# Patient Record
Sex: MALE | Race: WHITE | Employment: OTHER | URBAN - METROPOLITAN AREA
[De-identification: names, ages, dates, MRNs, and addresses within clinical notes are randomized per-mention and may not be internally consistent; named-entity substitution may affect disease eponyms.]

---

## 2017-01-03 ENCOUNTER — OFFICE VISIT (OUTPATIENT)
Dept: FAMILY MEDICINE CLINIC | Age: 72
End: 2017-01-03

## 2017-01-03 ENCOUNTER — TELEPHONE (OUTPATIENT)
Dept: FAMILY MEDICINE CLINIC | Age: 72
End: 2017-01-03

## 2017-01-03 VITALS
BODY MASS INDEX: 37.42 KG/M2 | TEMPERATURE: 98.8 F | HEIGHT: 67 IN | WEIGHT: 238.4 LBS | DIASTOLIC BLOOD PRESSURE: 80 MMHG | SYSTOLIC BLOOD PRESSURE: 126 MMHG

## 2017-01-03 DIAGNOSIS — J06.9 VIRAL URI WITH COUGH: Primary | ICD-10-CM

## 2017-01-03 PROCEDURE — 99213 OFFICE O/P EST LOW 20 MIN: CPT | Performed by: FAMILY MEDICINE

## 2017-01-03 RX ORDER — FLUTICASONE PROPIONATE 50 MCG
2 SPRAY, SUSPENSION (ML) NASAL DAILY
Qty: 1 BOTTLE | Refills: 0 | Status: SHIPPED | OUTPATIENT
Start: 2017-01-03 | End: 2017-11-06

## 2017-01-03 ASSESSMENT — ENCOUNTER SYMPTOMS
COUGH: 1
VOMITING: 0
NAUSEA: 0

## 2017-01-09 ENCOUNTER — TELEPHONE (OUTPATIENT)
Dept: FAMILY MEDICINE CLINIC | Age: 72
End: 2017-01-09

## 2017-01-09 RX ORDER — CEFUROXIME AXETIL 250 MG/1
250 TABLET ORAL 2 TIMES DAILY
Qty: 20 TABLET | Refills: 0 | Status: SHIPPED | OUTPATIENT
Start: 2017-01-09 | End: 2017-01-19

## 2017-01-13 ENCOUNTER — TELEPHONE (OUTPATIENT)
Dept: CARDIOLOGY CLINIC | Age: 72
End: 2017-01-13

## 2017-01-23 ENCOUNTER — TELEPHONE (OUTPATIENT)
Dept: CARDIOLOGY CLINIC | Age: 72
End: 2017-01-23

## 2017-02-02 RX ORDER — TORSEMIDE 20 MG/1
TABLET ORAL
Qty: 360 TABLET | Refills: 0 | Status: SHIPPED | OUTPATIENT
Start: 2017-02-02 | End: 2017-02-20 | Stop reason: SDUPTHER

## 2017-02-21 RX ORDER — TORSEMIDE 20 MG/1
TABLET ORAL
Qty: 360 TABLET | Refills: 1 | Status: SHIPPED | OUTPATIENT
Start: 2017-02-21 | End: 2017-11-02 | Stop reason: SDUPTHER

## 2017-02-22 ENCOUNTER — NURSE ONLY (OUTPATIENT)
Dept: CARDIOLOGY CLINIC | Age: 72
End: 2017-02-22

## 2017-02-22 DIAGNOSIS — Z95.810 AUTOMATIC IMPLANTABLE CARDIOVERTER-DEFIBRILLATOR IN SITU: ICD-10-CM

## 2017-02-22 DIAGNOSIS — I50.22 CHRONIC SYSTOLIC HEART FAILURE (HCC): ICD-10-CM

## 2017-02-22 DIAGNOSIS — I42.9 CARDIOMYOPATHY (HCC): ICD-10-CM

## 2017-02-22 PROCEDURE — 93295 DEV INTERROG REMOTE 1/2/MLT: CPT | Performed by: INTERNAL MEDICINE

## 2017-02-22 PROCEDURE — 93296 REM INTERROG EVL PM/IDS: CPT | Performed by: INTERNAL MEDICINE

## 2017-02-22 PROCEDURE — 93297 REM INTERROG DEV EVAL ICPMS: CPT | Performed by: INTERNAL MEDICINE

## 2017-02-23 ENCOUNTER — OFFICE VISIT (OUTPATIENT)
Dept: FAMILY MEDICINE CLINIC | Age: 72
End: 2017-02-23

## 2017-02-23 VITALS
BODY MASS INDEX: 38.3 KG/M2 | WEIGHT: 244 LBS | DIASTOLIC BLOOD PRESSURE: 80 MMHG | TEMPERATURE: 98.6 F | HEIGHT: 67 IN | SYSTOLIC BLOOD PRESSURE: 126 MMHG

## 2017-02-23 DIAGNOSIS — F51.01 PRIMARY INSOMNIA: Primary | ICD-10-CM

## 2017-02-23 PROCEDURE — 99213 OFFICE O/P EST LOW 20 MIN: CPT | Performed by: FAMILY MEDICINE

## 2017-02-23 RX ORDER — ALPRAZOLAM 0.5 MG/1
TABLET ORAL
Qty: 30 TABLET | Refills: 0 | Status: SHIPPED | OUTPATIENT
Start: 2017-02-23 | End: 2017-02-23 | Stop reason: SDUPTHER

## 2017-02-23 RX ORDER — ALPRAZOLAM 0.5 MG/1
TABLET ORAL
Qty: 30 TABLET | Refills: 0 | Status: SHIPPED | OUTPATIENT
Start: 2017-02-23 | End: 2017-05-30 | Stop reason: SDUPTHER

## 2017-02-28 ENCOUNTER — TELEPHONE (OUTPATIENT)
Dept: INTERNAL MEDICINE CLINIC | Age: 72
End: 2017-02-28

## 2017-02-28 PROBLEM — F51.01 PRIMARY INSOMNIA: Status: ACTIVE | Noted: 2017-02-28

## 2017-02-28 ASSESSMENT — ENCOUNTER SYMPTOMS: SHORTNESS OF BREATH: 0

## 2017-03-02 DIAGNOSIS — Z51.81 MEDICATION MONITORING ENCOUNTER: Primary | ICD-10-CM

## 2017-03-02 DIAGNOSIS — Z12.11 SCREENING FOR COLON CANCER: ICD-10-CM

## 2017-03-07 LAB
6-ACETYLMORPHINE: NOT DETECTED
7-AMINOCLONAZEPAM: NOT DETECTED
ALPHA-OH-ALPRAZOLAM: PRESENT
ALPRAZOLAM: NOT DETECTED
AMPHETAMINE: NOT DETECTED
BARBITURATES: NOT DETECTED
BENZOYLECGONINE: NOT DETECTED
BUPRENORPHINE: NOT DETECTED
CARISOPRODOL: NOT DETECTED
CLONAZEPAM: NOT DETECTED
CODEINE: NOT DETECTED
CREATININE URINE: 52.4 MG/DL (ref 20–400)
DIAZEPAM: NOT DETECTED
DRUGS EXPECTED: NORMAL
EER PAIN MGT DRUG PANEL, HIGH RES/EMIT U: NORMAL
ETHYL GLUCURONIDE: NOT DETECTED
FENTANYL: NOT DETECTED
HYDROCODONE: NOT DETECTED
HYDROMORPHONE: NOT DETECTED
LORAZEPAM: NOT DETECTED
MARIJUANA METABOLITE: NOT DETECTED
MDA: NOT DETECTED
MDEA: NOT DETECTED
MDMA URINE: NOT DETECTED
MEPERIDINE: NOT DETECTED
METHADONE: NOT DETECTED
METHAMPHETAMINE: NOT DETECTED
METHYLPHENIDATE: NOT DETECTED
MIDAZOLAM: NOT DETECTED
MORPHINE: NOT DETECTED
NORBUPRENORPHINE, FREE: NOT DETECTED
NORDIAZEPAM: NOT DETECTED
NORFENTANYL: NOT DETECTED
NORHYDROCODONE, URINE: NOT DETECTED
NOROXYCODONE: NOT DETECTED
NOROXYMORPHONE, URINE: NOT DETECTED
OXAZEPAM: NOT DETECTED
OXYCODONE: NOT DETECTED
OXYMORPHONE: NOT DETECTED
PAIN MANAGEMENT DRUG PANEL: NORMAL
PAIN MANAGEMENT DRUG PANEL: NORMAL
PCP: NOT DETECTED
PHENTERMINE: NOT DETECTED
PROPOXYPHENE: NOT DETECTED
TAPENTADOL, URINE: NOT DETECTED
TAPENTADOL-O-SULFATE, URINE: NOT DETECTED
TEMAZEPAM: NOT DETECTED
TRAMADOL: NOT DETECTED
ZOLPIDEM: NOT DETECTED

## 2017-03-16 ENCOUNTER — TELEPHONE (OUTPATIENT)
Dept: FAMILY MEDICINE CLINIC | Age: 72
End: 2017-03-16

## 2017-03-16 RX ORDER — SYRINGE-NEEDLE,INSULIN,0.5 ML 28GX1/2"
SYRINGE, EMPTY DISPOSABLE MISCELLANEOUS
Qty: 200 EACH | Refills: 12 | Status: SHIPPED | OUTPATIENT
Start: 2017-03-16 | End: 2020-01-01 | Stop reason: ALTCHOICE

## 2017-03-30 ENCOUNTER — OFFICE VISIT (OUTPATIENT)
Dept: CARDIOLOGY CLINIC | Age: 72
End: 2017-03-30

## 2017-03-30 ENCOUNTER — PROCEDURE VISIT (OUTPATIENT)
Dept: CARDIOLOGY CLINIC | Age: 72
End: 2017-03-30

## 2017-03-30 VITALS
OXYGEN SATURATION: 97 % | DIASTOLIC BLOOD PRESSURE: 70 MMHG | BODY MASS INDEX: 37.13 KG/M2 | WEIGHT: 245 LBS | SYSTOLIC BLOOD PRESSURE: 130 MMHG | HEIGHT: 68 IN | HEART RATE: 97 BPM

## 2017-03-30 DIAGNOSIS — I25.5 ISCHEMIC CARDIOMYOPATHY: Primary | ICD-10-CM

## 2017-03-30 DIAGNOSIS — I42.9 CARDIOMYOPATHY (HCC): ICD-10-CM

## 2017-03-30 DIAGNOSIS — Z95.810 AUTOMATIC IMPLANTABLE CARDIOVERTER-DEFIBRILLATOR IN SITU: ICD-10-CM

## 2017-03-30 DIAGNOSIS — I25.10 CORONARY ARTERY DISEASE INVOLVING NATIVE HEART WITHOUT ANGINA PECTORIS, UNSPECIFIED VESSEL OR LESION TYPE: ICD-10-CM

## 2017-03-30 DIAGNOSIS — I48.0 PAROXYSMAL ATRIAL FIBRILLATION (HCC): ICD-10-CM

## 2017-03-30 DIAGNOSIS — E78.2 MIXED HYPERLIPIDEMIA: ICD-10-CM

## 2017-03-30 DIAGNOSIS — I10 ESSENTIAL HYPERTENSION: ICD-10-CM

## 2017-03-30 DIAGNOSIS — I50.22 CHRONIC SYSTOLIC HEART FAILURE (HCC): ICD-10-CM

## 2017-03-30 DIAGNOSIS — I50.22 CHRONIC SYSTOLIC CONGESTIVE HEART FAILURE (HCC): ICD-10-CM

## 2017-03-30 PROCEDURE — 93284 PRGRMG EVAL IMPLANTABLE DFB: CPT | Performed by: INTERNAL MEDICINE

## 2017-03-30 PROCEDURE — 93290 INTERROG DEV EVAL ICPMS IP: CPT | Performed by: INTERNAL MEDICINE

## 2017-03-30 PROCEDURE — 99214 OFFICE O/P EST MOD 30 MIN: CPT | Performed by: INTERNAL MEDICINE

## 2017-04-09 RX ORDER — METOPROLOL SUCCINATE 100 MG/1
TABLET, EXTENDED RELEASE ORAL
Qty: 90 TABLET | Refills: 0 | Status: SHIPPED | OUTPATIENT
Start: 2017-04-09 | End: 2017-06-05 | Stop reason: SDUPTHER

## 2017-04-12 RX ORDER — RAMIPRIL 10 MG/1
CAPSULE ORAL
Qty: 90 CAPSULE | Refills: 3 | Status: SHIPPED | OUTPATIENT
Start: 2017-04-12 | End: 2018-01-13 | Stop reason: SDUPTHER

## 2017-05-30 ENCOUNTER — OFFICE VISIT (OUTPATIENT)
Dept: FAMILY MEDICINE CLINIC | Age: 72
End: 2017-05-30

## 2017-05-30 VITALS
HEIGHT: 68 IN | WEIGHT: 238.4 LBS | TEMPERATURE: 98 F | SYSTOLIC BLOOD PRESSURE: 122 MMHG | DIASTOLIC BLOOD PRESSURE: 78 MMHG | BODY MASS INDEX: 36.13 KG/M2

## 2017-05-30 DIAGNOSIS — Z11.59 NEED FOR HEPATITIS C SCREENING TEST: ICD-10-CM

## 2017-05-30 DIAGNOSIS — Z79.4 TYPE 2 DIABETES MELLITUS WITH HYPEROSMOLARITY WITHOUT COMA, WITH LONG-TERM CURRENT USE OF INSULIN (HCC): ICD-10-CM

## 2017-05-30 DIAGNOSIS — E11.00 TYPE 2 DIABETES MELLITUS WITH HYPEROSMOLARITY WITHOUT COMA, WITH LONG-TERM CURRENT USE OF INSULIN (HCC): ICD-10-CM

## 2017-05-30 DIAGNOSIS — F51.01 PRIMARY INSOMNIA: Primary | ICD-10-CM

## 2017-05-30 DIAGNOSIS — Z12.5 SCREENING FOR PROSTATE CANCER: ICD-10-CM

## 2017-05-30 DIAGNOSIS — E78.2 MIXED HYPERLIPIDEMIA: ICD-10-CM

## 2017-05-30 DIAGNOSIS — I10 ESSENTIAL HYPERTENSION: ICD-10-CM

## 2017-05-30 PROCEDURE — 99213 OFFICE O/P EST LOW 20 MIN: CPT | Performed by: FAMILY MEDICINE

## 2017-05-30 RX ORDER — ALPRAZOLAM 0.5 MG/1
TABLET ORAL
Qty: 30 TABLET | Refills: 0 | Status: SHIPPED | OUTPATIENT
Start: 2017-05-30 | End: 2017-05-30 | Stop reason: SDUPTHER

## 2017-05-30 RX ORDER — ALPRAZOLAM 0.5 MG/1
TABLET ORAL
Qty: 30 TABLET | Refills: 0 | Status: SHIPPED | OUTPATIENT
Start: 2017-05-30 | End: 2017-08-29 | Stop reason: SDUPTHER

## 2017-06-05 ENCOUNTER — TELEPHONE (OUTPATIENT)
Dept: OTHER | Facility: CLINIC | Age: 72
End: 2017-06-05

## 2017-06-06 RX ORDER — METOPROLOL SUCCINATE 100 MG/1
TABLET, EXTENDED RELEASE ORAL
Qty: 90 TABLET | Refills: 0 | Status: SHIPPED | OUTPATIENT
Start: 2017-06-06 | End: 2017-10-18 | Stop reason: SDUPTHER

## 2017-06-06 RX ORDER — ATORVASTATIN CALCIUM 80 MG/1
TABLET, FILM COATED ORAL
Qty: 90 TABLET | Refills: 0 | Status: SHIPPED | OUTPATIENT
Start: 2017-06-06 | End: 2017-09-18 | Stop reason: SDUPTHER

## 2017-06-06 RX ORDER — GABAPENTIN 100 MG/1
CAPSULE ORAL
Qty: 180 CAPSULE | Refills: 1 | Status: SHIPPED | OUTPATIENT
Start: 2017-06-06 | End: 2017-11-20 | Stop reason: SDUPTHER

## 2017-07-07 ENCOUNTER — TELEPHONE (OUTPATIENT)
Dept: CARDIOLOGY CLINIC | Age: 72
End: 2017-07-07

## 2017-07-10 ENCOUNTER — TELEPHONE (OUTPATIENT)
Dept: FAMILY MEDICINE CLINIC | Age: 72
End: 2017-07-10

## 2017-07-11 ENCOUNTER — NURSE ONLY (OUTPATIENT)
Dept: CARDIOLOGY CLINIC | Age: 72
End: 2017-07-11

## 2017-07-11 DIAGNOSIS — I42.9 CARDIOMYOPATHY (HCC): ICD-10-CM

## 2017-07-11 DIAGNOSIS — I50.22 CHRONIC SYSTOLIC HEART FAILURE (HCC): ICD-10-CM

## 2017-07-11 DIAGNOSIS — Z95.810 AUTOMATIC IMPLANTABLE CARDIOVERTER-DEFIBRILLATOR IN SITU: ICD-10-CM

## 2017-07-11 PROCEDURE — 93295 DEV INTERROG REMOTE 1/2/MLT: CPT | Performed by: INTERNAL MEDICINE

## 2017-07-11 PROCEDURE — 93297 REM INTERROG DEV EVAL ICPMS: CPT | Performed by: INTERNAL MEDICINE

## 2017-07-11 PROCEDURE — 93296 REM INTERROG EVL PM/IDS: CPT | Performed by: INTERNAL MEDICINE

## 2017-07-21 ENCOUNTER — TELEPHONE (OUTPATIENT)
Dept: CARDIOLOGY CLINIC | Age: 72
End: 2017-07-21

## 2017-07-21 DIAGNOSIS — R60.0 BILATERAL LOWER EXTREMITY EDEMA: Primary | ICD-10-CM

## 2017-07-24 DIAGNOSIS — R60.0 BILATERAL LOWER EXTREMITY EDEMA: ICD-10-CM

## 2017-07-24 LAB
ANION GAP SERPL CALCULATED.3IONS-SCNC: 17 MMOL/L (ref 3–16)
BUN BLDV-MCNC: 39 MG/DL (ref 7–20)
CALCIUM SERPL-MCNC: 9.5 MG/DL (ref 8.3–10.6)
CHLORIDE BLD-SCNC: 91 MMOL/L (ref 99–110)
CO2: 30 MMOL/L (ref 21–32)
CREAT SERPL-MCNC: 1.6 MG/DL (ref 0.8–1.3)
GFR AFRICAN AMERICAN: 52
GFR NON-AFRICAN AMERICAN: 43
GLUCOSE BLD-MCNC: 203 MG/DL (ref 70–99)
POTASSIUM SERPL-SCNC: 4 MMOL/L (ref 3.5–5.1)
SODIUM BLD-SCNC: 138 MMOL/L (ref 136–145)

## 2017-07-25 ENCOUNTER — TELEPHONE (OUTPATIENT)
Dept: CARDIOLOGY CLINIC | Age: 72
End: 2017-07-25

## 2017-07-31 ENCOUNTER — OFFICE VISIT (OUTPATIENT)
Dept: FAMILY MEDICINE CLINIC | Age: 72
End: 2017-07-31

## 2017-07-31 DIAGNOSIS — Z79.4 UNCONTROLLED TYPE 2 DIABETES MELLITUS WITH HYPEROSMOLARITY WITHOUT COMA, WITH LONG-TERM CURRENT USE OF INSULIN (HCC): Primary | ICD-10-CM

## 2017-07-31 DIAGNOSIS — E11.00 UNCONTROLLED TYPE 2 DIABETES MELLITUS WITH HYPEROSMOLARITY WITHOUT COMA, WITH LONG-TERM CURRENT USE OF INSULIN (HCC): Primary | ICD-10-CM

## 2017-07-31 PROCEDURE — 99213 OFFICE O/P EST LOW 20 MIN: CPT | Performed by: FAMILY MEDICINE

## 2017-08-14 ENCOUNTER — TELEPHONE (OUTPATIENT)
Dept: FAMILY MEDICINE CLINIC | Age: 72
End: 2017-08-14

## 2017-08-14 ASSESSMENT — ENCOUNTER SYMPTOMS: SHORTNESS OF BREATH: 0

## 2017-08-14 NOTE — TELEPHONE ENCOUNTER
Pt is calling w/ bs readings : All done in the morning     148    121  105  155  131    Pl advise.    [phone]

## 2017-08-14 NOTE — TELEPHONE ENCOUNTER
Please call mr irvin -- have him increase to 85 units units of the lantus.   Call here again Friday morning please with am sugars for tues, wed, thurs, and Friday    thanks

## 2017-08-28 ENCOUNTER — OFFICE VISIT (OUTPATIENT)
Dept: FAMILY MEDICINE CLINIC | Age: 72
End: 2017-08-28

## 2017-08-28 VITALS
BODY MASS INDEX: 36.22 KG/M2 | HEART RATE: 100 BPM | WEIGHT: 239 LBS | TEMPERATURE: 97.6 F | DIASTOLIC BLOOD PRESSURE: 80 MMHG | SYSTOLIC BLOOD PRESSURE: 120 MMHG | HEIGHT: 68 IN

## 2017-08-28 DIAGNOSIS — F51.01 PRIMARY INSOMNIA: Primary | ICD-10-CM

## 2017-08-28 DIAGNOSIS — Z12.5 SCREENING FOR PROSTATE CANCER: ICD-10-CM

## 2017-08-28 DIAGNOSIS — Z79.4 TYPE 2 DIABETES MELLITUS WITH HYPEROSMOLARITY WITHOUT COMA, WITH LONG-TERM CURRENT USE OF INSULIN (HCC): ICD-10-CM

## 2017-08-28 DIAGNOSIS — E11.00 TYPE 2 DIABETES MELLITUS WITH HYPEROSMOLARITY WITHOUT COMA, WITH LONG-TERM CURRENT USE OF INSULIN (HCC): ICD-10-CM

## 2017-08-28 DIAGNOSIS — Z11.59 NEED FOR HEPATITIS C SCREENING TEST: ICD-10-CM

## 2017-08-28 LAB
ALT SERPL-CCNC: 13 U/L (ref 10–40)
ANION GAP SERPL CALCULATED.3IONS-SCNC: 14 MMOL/L (ref 3–16)
AST SERPL-CCNC: 15 U/L (ref 15–37)
BUN BLDV-MCNC: 39 MG/DL (ref 7–20)
CALCIUM SERPL-MCNC: 9.7 MG/DL (ref 8.3–10.6)
CHLORIDE BLD-SCNC: 94 MMOL/L (ref 99–110)
CHOLESTEROL, TOTAL: 134 MG/DL (ref 0–199)
CO2: 31 MMOL/L (ref 21–32)
CREAT SERPL-MCNC: 1.5 MG/DL (ref 0.8–1.3)
CREATININE URINE: 81.7 MG/DL (ref 39–259)
GFR AFRICAN AMERICAN: 56
GFR NON-AFRICAN AMERICAN: 46
GLUCOSE BLD-MCNC: 166 MG/DL (ref 70–99)
HDLC SERPL-MCNC: 33 MG/DL (ref 40–60)
HEPATITIS C ANTIBODY INTERPRETATION: NORMAL
LDL CHOLESTEROL CALCULATED: 72 MG/DL
MICROALBUMIN UR-MCNC: 5.4 MG/DL
MICROALBUMIN/CREAT UR-RTO: 66.1 MG/G (ref 0–30)
POTASSIUM SERPL-SCNC: 4 MMOL/L (ref 3.5–5.1)
PROSTATE SPECIFIC ANTIGEN: 0.33 NG/ML (ref 0–4)
SODIUM BLD-SCNC: 139 MMOL/L (ref 136–145)
TRIGL SERPL-MCNC: 147 MG/DL (ref 0–150)
VLDLC SERPL CALC-MCNC: 29 MG/DL

## 2017-08-28 PROCEDURE — 99213 OFFICE O/P EST LOW 20 MIN: CPT | Performed by: FAMILY MEDICINE

## 2017-08-29 ENCOUNTER — NURSE ONLY (OUTPATIENT)
Dept: FAMILY MEDICINE CLINIC | Age: 72
End: 2017-08-29

## 2017-08-29 DIAGNOSIS — Z23 NEED FOR INFLUENZA VACCINATION: Primary | ICD-10-CM

## 2017-08-29 LAB
ESTIMATED AVERAGE GLUCOSE: 214.5 MG/DL
HBA1C MFR BLD: 9.1 %

## 2017-08-29 PROCEDURE — 90662 IIV NO PRSV INCREASED AG IM: CPT | Performed by: FAMILY MEDICINE

## 2017-08-29 PROCEDURE — G0008 ADMIN INFLUENZA VIRUS VAC: HCPCS | Performed by: FAMILY MEDICINE

## 2017-08-29 RX ORDER — ALPRAZOLAM 0.5 MG/1
TABLET ORAL
Qty: 30 TABLET | Refills: 0 | Status: SHIPPED | OUTPATIENT
Start: 2017-08-29 | End: 2017-08-29 | Stop reason: SDUPTHER

## 2017-08-29 RX ORDER — ALPRAZOLAM 0.5 MG/1
TABLET ORAL
Qty: 30 TABLET | Refills: 0 | Status: SHIPPED | OUTPATIENT
Start: 2017-08-29 | End: 2017-12-08 | Stop reason: SDUPTHER

## 2017-09-05 ASSESSMENT — ENCOUNTER SYMPTOMS: SHORTNESS OF BREATH: 0

## 2017-09-08 ENCOUNTER — TELEPHONE (OUTPATIENT)
Dept: FAMILY MEDICINE CLINIC | Age: 72
End: 2017-09-08

## 2017-09-15 RX ORDER — GLIMEPIRIDE 2 MG/1
2 TABLET ORAL EVERY MORNING
Qty: 30 TABLET | Refills: 3 | Status: SHIPPED | OUTPATIENT
Start: 2017-09-15 | End: 2018-01-09 | Stop reason: ALTCHOICE

## 2017-09-20 RX ORDER — ATORVASTATIN CALCIUM 80 MG/1
TABLET, FILM COATED ORAL
Qty: 90 TABLET | Refills: 0 | Status: SHIPPED | OUTPATIENT
Start: 2017-09-20 | End: 2017-11-20 | Stop reason: SDUPTHER

## 2017-10-09 ENCOUNTER — OFFICE VISIT (OUTPATIENT)
Dept: FAMILY MEDICINE CLINIC | Age: 72
End: 2017-10-09

## 2017-10-09 VITALS
SYSTOLIC BLOOD PRESSURE: 120 MMHG | HEIGHT: 68 IN | WEIGHT: 247 LBS | BODY MASS INDEX: 37.44 KG/M2 | TEMPERATURE: 98 F | DIASTOLIC BLOOD PRESSURE: 70 MMHG

## 2017-10-09 DIAGNOSIS — I25.10 CORONARY ARTERY DISEASE INVOLVING NATIVE HEART WITHOUT ANGINA PECTORIS, UNSPECIFIED VESSEL OR LESION TYPE: ICD-10-CM

## 2017-10-09 DIAGNOSIS — I10 ESSENTIAL HYPERTENSION: ICD-10-CM

## 2017-10-09 DIAGNOSIS — J44.9 CHRONIC OBSTRUCTIVE PULMONARY DISEASE, UNSPECIFIED COPD TYPE (HCC): ICD-10-CM

## 2017-10-09 DIAGNOSIS — Z01.818 PRE-OP EXAM: Primary | ICD-10-CM

## 2017-10-09 PROCEDURE — 99214 OFFICE O/P EST MOD 30 MIN: CPT | Performed by: FAMILY MEDICINE

## 2017-10-09 ASSESSMENT — ENCOUNTER SYMPTOMS
ROS SKIN COMMENTS: NO SKIN WOUNDS.
EYE DISCHARGE: 0
COUGH: 0
EYE ITCHING: 0
TROUBLE SWALLOWING: 0
BLOOD IN STOOL: 0
ABDOMINAL PAIN: 0
EYE REDNESS: 0

## 2017-10-09 NOTE — PROGRESS NOTES
10/9/2017    Meryle Chuck is a 81 y.o. male    Chief Complaint   Patient presents with    Pre-op Exam     Pre op left cataract 10/10/18 Dr. Amisha Vera, [address] [place], Fax# [phone]. Has 3 different eyedrops for surgery, understands how to take them, but doesn't have names. SUBJECTIVE  HPI--pt is here for a pre op exam today -- he is having a left cataract removal           Review of Systems   Constitutional: Negative for chills, fever and unexpected weight change. HENT: Negative for ear discharge, nosebleeds and trouble swallowing. Eyes: Negative for discharge, redness and itching. Respiratory: Negative for cough. Cardiovascular: Negative for chest pain. Gastrointestinal: Negative for abdominal pain and blood in stool. Genitourinary: Negative for dysuria and hematuria. Skin: Negative for rash. No skin wounds. Neurological: Negative for dizziness and syncope. Hematological: Negative for adenopathy. Psychiatric/Behavioral: Negative for suicidal ideas.        Past Medical History:   Diagnosis Date    Acute on chronic systolic congestive heart failure ([address] 2/6/2014    Anemia     Arthritis     Bronchiolitis obliterans organizing pneumonia ([address]     CAD (coronary artery disease)     cardiomyopathy    CHF (congestive heart failure) (HCC)     Congestive heart failure, unspecified     Congestive heart failure    Disease of blood and blood forming organ     Hyperlipidemia     Hypertension     Kidney stone     Neuropathy ([address]     Osteoarthritis 8/13/2012    Pneumonia     BOOP    Pure hypercholesterolemia 8/1/2011    Seasonal allergies     Sleep apnea     Type II or unspecified type diabetes mellitus without mention of complication, not stated as uncontrolled     Type II or unspecified type diabetes mellitus without mention of complication, uncontrolled 8/1/2011     Past Surgical History:   Procedure Laterality Date    CARDIAC DEFIBRILLATOR PLACEMENT      CORONARY ANGIOPLASTY WITH STENT PLACEMENT  2000    CORONARY ANGIOPLASTY WITH STENT PLACEMENT  11.2012    HEMORRHOID SURGERY       Family History   Problem Relation Age of Onset    Cancer Mother     Diabetes Mother     Diabetes Sister     Cancer Brother      History   Smoking Status    Former Smoker    Quit date: 1/1/1967   Smokeless Tobacco    Never Used      Patient Active Problem List   Diagnosis    HTN (hypertension)    CAD (coronary artery disease)    Ischemic cardiomyopathy    COPD (chronic obstructive pulmonary disease) [facility]    [facility]    MIKEY [facility]acute kidney injury) ([facility]    Hyperlipidemia    DMII (diabetes mellitus, type 2) ([facility]    Elevated troponin    Hypomagnesemia    Chest pain    Atrial fibrillation ([facility]    NSTEMI (non-ST elevated myocardial infarction) ([facility]    Cardiomyopathy ([facility]    AMI (acute myocardial infarction) ([facility]    DM hyperosmolarity type II, uncontrolled ([facility]    Anemia    Insomnia    Automatic implantable cardioverter-defibrillator in situ    Chronic systolic heart failure ([facility]    Primary insomnia     Health Maintenance reviewed -- see chart. OBJECTIVE  Physical Exam   Constitutional: He is oriented to person, place, and time. He appears well-developed and well-nourished. Eyes: Conjunctivae and EOM are normal. Pupils are equal, round, and reactive to light. Neck: Normal range of motion. Neck supple. No thyromegaly present. Cardiovascular: Normal rate and regular rhythm. Pulmonary/Chest: Effort normal and breath sounds normal.   Abdominal: Soft. Bowel sounds are normal. He exhibits no mass. There is no tenderness. Musculoskeletal: Normal range of motion. He exhibits no edema. Neurological: He is alert and oriented to person, place, and time. Skin: No rash noted. No sores or skin wounds noted. Psychiatric: He has a normal mood and affect.  His behavior is normal. Judgment and thought content normal.   Vitals reviewed. allergies  Cipro xr; Claritin [loratadine]; Levofloxacin; and Peppermint flavor [flavoring agent]         Current Outpatient Prescriptions   Medication Sig Dispense Refill    atorvastatin (LIPITOR) 80 MG tablet TAKE 1 TABLET EVERY DAY 90 tablet 0    glimepiride (AMARYL) 2 MG tablet Take 1 tablet by mouth every morning 30 tablet 3    ALPRAZolam (XANAX) 0.5 MG tablet 1 po at hs prn anxiety. No etoh. No driving. Do NOT take with ambien. 30 tablet 0    apixaban (ELIQUIS) 5 MG TABS tablet Take 1 tablet by mouth 2 times daily 56 tablet 0    metoprolol succinate (TOPROL XL) 100 MG extended release tablet TAKE 1 TABLET EVERY DAY 90 tablet 0    gabapentin (NEURONTIN) 100 MG capsule TAKE 2 CAPSULES EVERY  capsule 1    ramipril (ALTACE) 10 MG capsule TAKE 1 CAPSULE EVERY DAY 90 capsule 3    Insulin Syringe-Needle U-100 (INSULIN SYRINGE .5CC/30GX1/2\") 30G X 1/2\" 0.5 ML MISC Use 5 times a day with insulin. GENERIC IS OK. 200 each 12    Insulin Pen Needle (B-D ULTRAFINE III SHORT PEN) 31G X 8 MM MISC 1 each by Does not apply route daily 100 each 3    torsemide (DEMADEX) 20 MG tablet TAKE 2 TABLETS BY MOUTH TWICE DAILY 360 tablet 1    fluticasone (FLONASE) 50 MCG/ACT nasal spray 2 sprays by Nasal route daily 1 Bottle 0    magnesium oxide (MAG-OX) 400 MG tablet Take 1 tablet daily. 90 tablet 2    ranitidine (ZANTAC) 300 MG tablet Take 1 tablet by mouth nightly 30 tablet 3    guaiFENesin (MUCINEX) 600 MG extended release tablet Take 2 tablets by mouth 2 times daily 20 tablet 0    albuterol sulfate HFA (PROAIR HFA) 108 (90 BASE) MCG/ACT inhaler Inhale 2 puffs into the lungs every 4 hours as needed for Wheezing or Shortness of Breath 1 Inhaler 0    glucose blood VI test strips (ACCU-CHEK CARY) strip 1 each by In Vitro route daily As needed.   DX: E11.9 100 each 3    Lancets MISC accu-check cary  DX: E11.9 100 each 3    Blood Glucose Monitoring Suppl (ACCU-CHEK CARY PLUS) angina pectoris, unspecified vessel or lesion type     4. Chronic obstructive pulmonary disease, unspecified COPD type ([facility]         Екатерина Ramsay is at a low cardiovascular risk for planned left cataract removal.  He may proceed with planned surgery. New meds this visit:  No orders of the defined types were placed in this encounter. continue with the following meds:    Outpatient Encounter Prescriptions as of 10/9/2017   Medication Sig Dispense Refill    atorvastatin (LIPITOR) 80 MG tablet TAKE 1 TABLET EVERY DAY 90 tablet 0    glimepiride (AMARYL) 2 MG tablet Take 1 tablet by mouth every morning 30 tablet 3    ALPRAZolam (XANAX) 0.5 MG tablet 1 po at hs prn anxiety. No etoh. No driving. Do NOT take with ambien. 30 tablet 0    apixaban (ELIQUIS) 5 MG TABS tablet Take 1 tablet by mouth 2 times daily 56 tablet 0    metoprolol succinate (TOPROL XL) 100 MG extended release tablet TAKE 1 TABLET EVERY DAY 90 tablet 0    gabapentin (NEURONTIN) 100 MG capsule TAKE 2 CAPSULES EVERY  capsule 1    ramipril (ALTACE) 10 MG capsule TAKE 1 CAPSULE EVERY DAY 90 capsule 3    Insulin Syringe-Needle U-100 (INSULIN SYRINGE .5CC/30GX1/2\") 30G X 1/2\" 0.5 ML MISC Use 5 times a day with insulin. GENERIC IS OK. 200 each 12    Insulin Pen Needle (B-D ULTRAFINE III SHORT PEN) 31G X 8 MM MISC 1 each by Does not apply route daily 100 each 3    torsemide (DEMADEX) 20 MG tablet TAKE 2 TABLETS BY MOUTH TWICE DAILY 360 tablet 1    fluticasone (FLONASE) 50 MCG/ACT nasal spray 2 sprays by Nasal route daily 1 Bottle 0    magnesium oxide (MAG-OX) 400 MG tablet Take 1 tablet daily.  90 tablet 2    ranitidine (ZANTAC) 300 MG tablet Take 1 tablet by mouth nightly 30 tablet 3    guaiFENesin (MUCINEX) 600 MG extended release tablet Take 2 tablets by mouth 2 times daily 20 tablet 0    albuterol sulfate HFA (PROAIR HFA) 108 (90 BASE) MCG/ACT inhaler Inhale 2 puffs into the lungs every 4 hours as needed for

## 2017-10-18 ENCOUNTER — TELEPHONE (OUTPATIENT)
Dept: OTHER | Facility: CLINIC | Age: 72
End: 2017-10-18

## 2017-10-20 ENCOUNTER — TELEPHONE (OUTPATIENT)
Dept: FAMILY MEDICINE CLINIC | Age: 72
End: 2017-10-20

## 2017-10-20 RX ORDER — METOPROLOL SUCCINATE 100 MG/1
TABLET, EXTENDED RELEASE ORAL
Qty: 30 TABLET | Refills: 0 | Status: SHIPPED | OUTPATIENT
Start: 2017-10-20 | End: 2018-03-19 | Stop reason: SDUPTHER

## 2017-10-20 NOTE — TELEPHONE ENCOUNTER
Pt states he is short on his Rx for Metoprolol and will run out before he gets refill through mail order. Pt is requesting a 30 day supply be sent to [facility]. Pls advise.

## 2017-10-23 ENCOUNTER — PROCEDURE VISIT (OUTPATIENT)
Dept: CARDIOLOGY CLINIC | Age: 72
End: 2017-10-23

## 2017-10-23 ENCOUNTER — OFFICE VISIT (OUTPATIENT)
Dept: CARDIOLOGY CLINIC | Age: 72
End: 2017-10-23

## 2017-10-23 VITALS
HEART RATE: 88 BPM | HEIGHT: 68 IN | OXYGEN SATURATION: 95 % | SYSTOLIC BLOOD PRESSURE: 128 MMHG | BODY MASS INDEX: 36.37 KG/M2 | DIASTOLIC BLOOD PRESSURE: 72 MMHG | WEIGHT: 240 LBS

## 2017-10-23 DIAGNOSIS — I25.5 ISCHEMIC CARDIOMYOPATHY: Primary | ICD-10-CM

## 2017-10-23 DIAGNOSIS — I10 ESSENTIAL HYPERTENSION: ICD-10-CM

## 2017-10-23 DIAGNOSIS — I48.20 CHRONIC ATRIAL FIBRILLATION (HCC): ICD-10-CM

## 2017-10-23 DIAGNOSIS — I50.22 CHRONIC SYSTOLIC HEART FAILURE (HCC): ICD-10-CM

## 2017-10-23 DIAGNOSIS — R60.0 LOWER EXTREMITY EDEMA: ICD-10-CM

## 2017-10-23 DIAGNOSIS — Z95.810 AUTOMATIC IMPLANTABLE CARDIOVERTER-DEFIBRILLATOR IN SITU: Primary | ICD-10-CM

## 2017-10-23 DIAGNOSIS — I25.5 ISCHEMIC CARDIOMYOPATHY: ICD-10-CM

## 2017-10-23 PROCEDURE — 99215 OFFICE O/P EST HI 40 MIN: CPT | Performed by: INTERNAL MEDICINE

## 2017-10-23 PROCEDURE — G8427 DOCREV CUR MEDS BY ELIG CLIN: HCPCS | Performed by: INTERNAL MEDICINE

## 2017-10-23 PROCEDURE — G8484 FLU IMMUNIZE NO ADMIN: HCPCS | Performed by: INTERNAL MEDICINE

## 2017-10-23 PROCEDURE — 1036F TOBACCO NON-USER: CPT | Performed by: INTERNAL MEDICINE

## 2017-10-23 PROCEDURE — 1123F ACP DISCUSS/DSCN MKR DOCD: CPT | Performed by: INTERNAL MEDICINE

## 2017-10-23 PROCEDURE — 93290 INTERROG DEV EVAL ICPMS IP: CPT | Performed by: INTERNAL MEDICINE

## 2017-10-23 PROCEDURE — 3017F COLORECTAL CA SCREEN DOC REV: CPT | Performed by: INTERNAL MEDICINE

## 2017-10-23 PROCEDURE — 93284 PRGRMG EVAL IMPLANTABLE DFB: CPT | Performed by: INTERNAL MEDICINE

## 2017-10-23 PROCEDURE — G8417 CALC BMI ABV UP PARAM F/U: HCPCS | Performed by: INTERNAL MEDICINE

## 2017-10-23 PROCEDURE — 4040F PNEUMOC VAC/ADMIN/RCVD: CPT | Performed by: INTERNAL MEDICINE

## 2017-10-23 PROCEDURE — G8598 ASA/ANTIPLAT THER USED: HCPCS | Performed by: INTERNAL MEDICINE

## 2017-10-23 NOTE — PROGRESS NOTES
[facility]   Electrophysiology   Date: 10/23/2017    Chief Complaint: Medication questions    HPI: Charmaine Pickering is a 81 y.o. with a PMH significant for severe CAD in the RCA and LAD, HLD, HTN, ischemic cardiomyopathy with an LVEF of 30-35% and CHF. Initially referred by Dr. Kai Osman for ICD placement. He complained of daily fatigue and intermittent SOB. He stated that he is able to climb a flight of stairs without difficulty. He reported that he would be able to walk a city block if it wasn't for his chronic back pain. Underwent Bi V-ICD placement 9/2015. Afib was documented on a device interrogation in 2016. He had reported associated fatigue, SOB and palpitations. Initially, he was loaded with amiodarone and a DCCV was scheduled. He was unable to tolerate the amiodarone therapy, he cancelled the DCCV and now states that the Afib \"doesn't bother him. \"    Presents today for a follow up for device and afib management. He reports that he is doing well. He offers no current complaints. He denies lightheadedness, dizziness, chest pain, orthopnea, presyncope or syncope. He has been compliant with his medications and tolerating well. He does not wish to pursue anti-arrhythmic therapy for his Afib.     Past Medical History:   Diagnosis Date    Acute on chronic systolic congestive heart failure ([address] 2/6/2014    Anemia     Arthritis     Bronchiolitis obliterans organizing pneumonia ([address]     CAD (coronary artery disease)     cardiomyopathy    CHF (congestive heart failure) (HCC)     Congestive heart failure, unspecified     Congestive heart failure    Disease of blood and blood forming organ     Hyperlipidemia     Hypertension     Kidney stone     Neuropathy ([address]     Osteoarthritis 8/13/2012    Pneumonia     BOOP    Pure hypercholesterolemia 8/1/2011    Seasonal allergies     Sleep apnea     Type II or unspecified type diabetes mellitus without mention of complication, not stated as uncontrolled     Type II or unspecified type diabetes mellitus without mention of complication, uncontrolled 8/1/2011        Past Surgical History:   Procedure Laterality Date    CARDIAC DEFIBRILLATOR PLACEMENT      CORONARY ANGIOPLASTY WITH STENT PLACEMENT  2000    CORONARY ANGIOPLASTY WITH STENT PLACEMENT  11.2012    HEMORRHOID SURGERY         Allergies: Allergies   Allergen Reactions    Cipro Xr     Claritin [Loratadine]     Levofloxacin     Peppermint Flavor [Flavoring Agent] Swelling       Medication:   Prior to Admission medications    Medication Sig Start Date End Date Taking? Authorizing Provider   metoprolol succinate (TOPROL XL) 100 MG extended release tablet TAKE 1 TABLET EVERY DAY 10/20/17  Yes Negar Florentino, DO   atorvastatin (LIPITOR) 80 MG tablet TAKE 1 TABLET EVERY DAY 9/20/17  Yes Negar Florentino, DO   glimepiride (AMARYL) 2 MG tablet Take 1 tablet by mouth every morning 9/15/17  Yes Janelle Mathew DO   ALPRAZolam (XANAX) 0.5 MG tablet 1 po at hs prn anxiety. No etoh. No driving. Do NOT take with ambien. 8/29/17  Yes Negar Florentino, DO   apixaban (ELIQUIS) 5 MG TABS tablet Take 1 tablet by mouth 2 times daily 7/10/17  Yes Nikko Hester MD   gabapentin (NEURONTIN) 100 MG capsule TAKE 2 CAPSULES EVERY DAY 6/6/17  Yes Jerilyn Mathew DO   ramipril (ALTACE) 10 MG capsule TAKE 1 CAPSULE EVERY DAY 4/12/17  Yes Negar Florentino, DO   Insulin Syringe-Needle U-100 (INSULIN SYRINGE .5CC/30GX1/2\") 30G X 1/2\" 0.5 ML MISC Use 5 times a day with insulin.  GENERIC IS OK. 3/16/17  Yes Negar Florentino DO   Insulin Pen Needle (B-D ULTRAFINE III SHORT PEN) 31G X 8 MM MISC 1 each by Does not apply route daily 3/14/17  Yes Negar Florentino, DO   torsemide (DEMADEX) 20 MG tablet TAKE 2 TABLETS BY MOUTH TWICE DAILY 2/21/17  Yes Nikko Hester MD   fluticasone Porshadane Macario) 50 MCG/ACT nasal spray 2 sprays by Nasal route daily 1/3/17  Yes Enio Mathew,    magnesium oxide (MAG-OX) 400 MG tablet Take 1 tablet daily. 12/16/16  Yes Luisa Noyola MD   ranitidine (ZANTAC) 300 MG tablet Take 1 tablet by mouth nightly 11/17/16  Yes Bella Dos Santos DO   guaiFENesin (MUCINEX) 600 MG extended release tablet Take 2 tablets by mouth 2 times daily 9/26/16  Yes Janelle Mathew DO   albuterol sulfate HFA (PROAIR HFA) 108 (90 BASE) MCG/ACT inhaler Inhale 2 puffs into the lungs every 4 hours as needed for Wheezing or Shortness of Breath 9/26/16  Yes Janelle Mathew DO   glucose blood VI test strips (ACCU-CHEK CARY) strip 1 each by In Vitro route daily As needed. DX: E11.9 8/19/16  Yes Bella Dos Santos DO   Lancets MISC accu-check cary  DX: E11.9 8/19/16  Yes Bella Dos Santos DO   Blood Glucose Monitoring Suppl (ACCU-CHEK CARY PLUS) W/DEVICE KIT 1 Device by Does not apply route 2 times daily Dx: E11.9 8/19/16  Yes Janelle Mathew DO   Blood Glucose Calibration (ACCU-CHEK INSTANT CONTROL) LIQD 1 Bottle by In Vitro route 2 times daily 8/19/16  Yes Bella Dos Santos DO   Alcohol Swabs (B-D SINGLE USE SWABS REGULAR) PADS 1 each by Does not apply route 2 times daily 8/19/16  Yes Janelle Mathew DO   insulin glargine (LANTUS SOLOSTAR) 100 UNIT/ML injection pen INJECT 80 UNITS SUBCUTANEOUSLY EVERY NIGHT 11/10/15  Yes Bella Dos Santos DO   Omega-3 Krill Oil 300 MG CAPS Take 1 tablet by mouth daily   Yes Historical Provider, MD   sildenafil (VIAGRA) 50 MG tablet Take 1 tablet by mouth as needed for Erectile Dysfunction Take 1 hour prior to sexual intercourse. May take 1/2 tablet initially. If no improvement take an additional 1/2 tablet. 8/14/15  Yes Luisa Noyola MD   metolazone (ZAROXOLYN) 2.5 MG tablet Take 1 tablet by mouth as needed (take as needed for weight gain of 2 pounds in 48 hours, make take daily as needed).  4/8/14  Yes Luias Noyola MD   NOVOLOG FLEXPEN 100 UNIT/ML Pulse: 88   SpO2: 95%       · Constitutional: Oriented. No distress. · Head: Normocephalic and atraumatic. · Mouth/Throat: Oropharynx is clear and moist.   · Eyes: Conjunctivae normal. EOM are normal.   · Neck: Normal range of motion. Neck supple. No rigidity. No JVD present. · Cardiovascular: Normal rate, irregular rhythm, S1&S2 and intact distal pulses. · Pulmonary/Chest: Bilateral respiratory sounds. No wheezes. No rhonchi. · Abdominal: Soft. Bowel sounds present. No distension, No tenderness. · Musculoskeletal: No tenderness. 1+ BLE edema R >L  · Lymphadenopathy: Has no cervical adenopathy. · Neurological: Alert and oriented. Cranial nerve appears intact, No Gross deficit   · Skin: Skin is warm and dry. No rash noted. · Psychiatric: Has a normal mood, affect and behavior     Labs:  Reviewed. Cr 1.5 (8/2017)  LDL 72 (8/2017)    ECG: reviewed, 9/30/16 paced, PVC's    Limited echocardiogram 4/16/14:  Global ejection fraction is moderate-to-severely decreased and estimated  from 30 % to 35 %. Abnormal (paradoxical) septal motion is present. Severe Anterior and  anterospeptal wall hypokinesis  Mild mitral regurgitation is present. Normal right ventricular size and function. There is trace tricuspid regurgitation with RVSP estimated at 40 mmHg  assuming a right atrial pressure of 5mmHg. This is suggestive of mild pulmonary hypertension. Echo: 2/2015  Global ejection fraction is moderate-to-severely decreased and estimated from 30 % to 35 %. Abnormal (paradoxical) septal motion is present likely due to . Severe anterior and anteroseptal hypokinesis c/w ischemic cardiomyopathy. As reported on 4-  Mild mitral regurgitation is present. Mildly dilated left atrium. Cath: 2/2015  1. Successful percutaneous coronary intervention using a 2.75-mm Xience drug-eluting stent in an 80% mid left anterior descending artery lesion that was moderately calcified.  This was dilated to 2.91 mm in diameter. Successful stenting with a 2.75 mm x 18 mm Xience drug-eluting stent in the ostial 80% left anterior descending artery lesion. Kissing balloon technique was utilized in the ostial circumflex artery and left anterior descending  artery stents, resulting in 0% residual stenosis and DEN 3 flow. 2. In the dominant right coronary artery, successfully intervened upon with a 3 mm x 38 mm Xience drug-eluting stent dilated to 3.20 mm. There was DEN 3 flow in the vessel and 0% residual stenosis. 3. Otherwise, in the left system, there is an 80% hazy first diagonal branch lesion. There was 30% restenosis in the proximal circumflex artery stent and diffuse disease otherwise. There is a residual 70% lesion in the distal left  anterior descending artery. 4. Moderately elevated left ventricular end-diastolic pressure of 25 mmHg. 5. Moderately severe left ventricular systolic dysfunction with global hypokinesis and left ventricular ejection fraction of 30%.     Assessment:   Patient Active Problem List    Diagnosis Date Noted    Chest pain 02/11/2015     Priority: High    Primary insomnia 02/28/2017    Chronic systolic heart failure ([address] 07/06/2016    Automatic implantable cardioverter-defibrillator in situ 09/30/2015    Insomnia 05/05/2015    Anemia 02/17/2015    DM hyperosmolarity type II, uncontrolled ([address] 02/12/2015    AMI (acute myocardial infarction)     Hyperlipidemia 02/11/2015    DMII (diabetes mellitus, type 2) ([address] 02/11/2015    Elevated troponin 02/11/2015    Hypomagnesemia 02/11/2015    Atrial fibrillation ([address] 02/11/2015    NSTEMI (non-ST elevated myocardial infarction) ([address]     Cardiomyopathy ([address]     MIKEY (acute kidney injury) ([address] 02/10/2015    COPD (chronic obstructive pulmonary disease) ([address] 11/14/2013    CHF (congestive heart failure) ([address] 11/14/2013    Ischemic cardiomyopathy 08/08/2013    CAD (coronary artery disease) 01/30/2012    HTN (hypertension) 08/01/2011 Plan:  1)  Cardiomyopathy:  Ischemic. S/p Bi-V ICD placement. EF 30-35%. NYHA Class II-III on OMT for > 3- months. Device interrogated and functioning appropriately. Pacing 64.3% Optivol stable. 2) Afib:  Chronic. Asymptomatic. Continue BB. Trialed amiodarone which he did not tolerate and he opted to cancel DCCV. CHADS Vasc score of 5. Plavix was discontinued at a prior office visit (continue ASA) and continue Eliquis. Current Bi-V pacing is 64%. In order to optimize Bi-V therapy, I recommend an AV node ablation. The risks, benefits and alternatives of the ablation procedure were discussed with the patient. The risks including, but not limited to, the risks of bleeding, infection, radiation exposure, injury to vascular, cardiac and surrounding structures (including pneumothorax), stroke, cardiac perforation, tamponade, need for emergent open heart surgery, need for pacemaker implantation, myocardial infarction and death were discussed in detail. The patient opted to proceed with the ablation. 3)  HTN:  Controlled. Goal < 120/80. Continue current medications. Educated on the importance of a low Na diet. Monitor. 4) HLD: Continue statin. 5)  BLE edema/ ascites/ weight gain:  Improved. Continue to follow daily weights and utilize diuretics as ordered.       Follow up s/p procedure    I have discussed the plan of care with the primary care team.    Aleksandr Salcedo MD, PhD

## 2017-10-23 NOTE — PROGRESS NOTES
Patient comes in for programming evaluation for his Medtronic Bi-Ventricular Defibrillator. All sensing and pacing parameters are within normal range. Mode changed to VVIR d/t his AF which has continued since 8/29/16. I also increased the Atrial sensitivity to 4.0mv. This will increase his battery longevity. Please see interrogation for more detail. Thoracic impedance trend stable. He had 1recorded HVR which was AF/RVR in 6/2017. Pt to see Dr. Carrie Schmitz today. Total -64.3% and VSR pace 34.5%, he is on toprol xl 100 mg daily but continues to have AF/RVR. Patient will follow up in 3 months in office or remotely.

## 2017-10-24 NOTE — PATIENT INSTRUCTIONS
Patient Education        Atrial Fibrillation: Care Instructions  Your Care Instructions    Atrial fibrillation is an irregular and often fast heartbeat. Treating this condition is important for several reasons. It can cause blood clots, which can travel from your heart to your brain and cause a stroke. If you have a fast heartbeat, you may feel lightheaded, dizzy, and weak. An irregular heartbeat can also increase your risk for heart failure. Atrial fibrillation is often the result of another heart condition, such as high blood pressure or coronary artery disease. Making changes to improve your heart condition will help you stay healthy and active. Follow-up care is a key part of your treatment and safety. Be sure to make and go to all appointments, and call your doctor if you are having problems. It's also a good idea to know your test results and keep a list of the medicines you take. How can you care for yourself at home? Medicines  · Take your medicines exactly as prescribed. Call your doctor if you think you are having a problem with your medicine. You will get more details on the specific medicines your doctor prescribes. · If your doctor has given you a blood thinner to prevent a stroke, be sure you get instructions about how to take your medicine safely. Blood thinners can cause serious bleeding problems. · Do not take any vitamins, over-the-counter drugs, or herbal products without talking to your doctor first.  Lifestyle changes  · Do not smoke. Smoking can increase your chance of a stroke and heart attack. If you need help quitting, talk to your doctor about stop-smoking programs and medicines. These can increase your chances of quitting for good. · Eat a heart-healthy diet. · Stay at a healthy weight. Lose weight if you need to. · Limit alcohol to 2 drinks a day for men and 1 drink a day for women. Too much alcohol can cause health problems. · Avoid colds and flu.  Get a pneumococcal vaccine

## 2017-10-26 ENCOUNTER — TELEPHONE (OUTPATIENT)
Dept: CARDIOLOGY CLINIC | Age: 72
End: 2017-10-26

## 2017-10-26 NOTE — TELEPHONE ENCOUNTER
I called the patient to schedule his Ablation with Dr. Praveen Calhoun. No answer at home # (HIPAA) [phone].   Message left to call Margarette Higgins at [phone]

## 2017-10-27 ENCOUNTER — TELEPHONE (OUTPATIENT)
Dept: CARDIOLOGY CLINIC | Age: 72
End: 2017-10-27

## 2017-10-27 NOTE — TELEPHONE ENCOUNTER
Shemar Mirza called in stating that he had Cataract Surgery 2-3 weeks ago that he forgot to tell Dr. Kamini Mina about. He is scheduled for an ablation on 11/6, he isn't sure if the Cataract Surgery makes a difference or if its even something Dr. Kamini Mina needs to know.     Shemar Mirza can be reached at [phone]

## 2017-10-31 DIAGNOSIS — I48.20 CHRONIC ATRIAL FIBRILLATION (HCC): ICD-10-CM

## 2017-10-31 LAB
ANION GAP SERPL CALCULATED.3IONS-SCNC: 16 MMOL/L (ref 3–16)
BUN BLDV-MCNC: 37 MG/DL (ref 7–20)
CALCIUM SERPL-MCNC: 9 MG/DL (ref 8.3–10.6)
CHLORIDE BLD-SCNC: 96 MMOL/L (ref 99–110)
CO2: 29 MMOL/L (ref 21–32)
CREAT SERPL-MCNC: 1.5 MG/DL (ref 0.8–1.3)
GFR AFRICAN AMERICAN: 56
GFR NON-AFRICAN AMERICAN: 46
GLUCOSE BLD-MCNC: 124 MG/DL (ref 70–99)
HCT VFR BLD CALC: 44.4 % (ref 40.5–52.5)
HEMOGLOBIN: 14.1 G/DL (ref 13.5–17.5)
INR BLD: 1.75 (ref 0.85–1.15)
MCH RBC QN AUTO: 26.8 PG (ref 26–34)
MCHC RBC AUTO-ENTMCNC: 31.8 G/DL (ref 31–36)
MCV RBC AUTO: 84 FL (ref 80–100)
PDW BLD-RTO: 17.6 % (ref 12.4–15.4)
PLATELET # BLD: 141 K/UL (ref 135–450)
PMV BLD AUTO: 11 FL (ref 5–10.5)
POTASSIUM SERPL-SCNC: 4 MMOL/L (ref 3.5–5.1)
PROTHROMBIN TIME: 19.8 SEC (ref 9.6–13)
RBC # BLD: 5.28 M/UL (ref 4.2–5.9)
SODIUM BLD-SCNC: 141 MMOL/L (ref 136–145)
WBC # BLD: 8.8 K/UL (ref 4–11)

## 2017-11-02 ENCOUNTER — TELEPHONE (OUTPATIENT)
Dept: CARDIOLOGY CLINIC | Age: 72
End: 2017-11-02

## 2017-11-02 RX ORDER — TORSEMIDE 20 MG/1
40 TABLET ORAL DAILY
Qty: 180 TABLET | Refills: 2 | Status: SHIPPED | OUTPATIENT
Start: 2017-11-02 | End: 2017-11-03 | Stop reason: SDUPTHER

## 2017-11-02 RX ORDER — TORSEMIDE 20 MG/1
TABLET ORAL
Qty: 60 TABLET | Refills: 5 | OUTPATIENT
Start: 2017-11-02

## 2017-11-02 NOTE — TELEPHONE ENCOUNTER
I spoke with pt today and answered his questions regarding his procedure (per Duran's note in Epic).

## 2017-11-02 NOTE — TELEPHONE ENCOUNTER
Tanya  has a few questions about his up and coming procedure already scheduled for Monday Nov 6th with Dr. Peg Patel can be reached at [phone]

## 2017-11-03 NOTE — TELEPHONE ENCOUNTER
Medication Refill    When was your last appointment with cardiology?  (if 1year or longer, please schedule an appointment)    Medication needing refilled: Torsemide    Doseage of the medication: 20 mg     How are you taking this medication (QD, BID, TID, QID, PRN):    Patient want a 30 or 90 day supply called in: 30 day     Which Pharmacy are we sending the medication to:  [facility] in [facility] number: [phone]    Pharmacy Fax number:

## 2017-11-06 ENCOUNTER — PROCEDURE VISIT (OUTPATIENT)
Dept: CARDIOLOGY CLINIC | Age: 72
End: 2017-11-06

## 2017-11-06 ENCOUNTER — HOSPITAL ENCOUNTER (OUTPATIENT)
Dept: CARDIAC CATH/INVASIVE PROCEDURES | Age: 72
Discharge: HOME | End: 2017-11-06
Attending: INTERNAL MEDICINE | Admitting: INTERNAL MEDICINE

## 2017-11-06 VITALS — HEIGHT: 68 IN | BODY MASS INDEX: 36.98 KG/M2 | WEIGHT: 244 LBS

## 2017-11-06 DIAGNOSIS — Z95.810 AUTOMATIC IMPLANTABLE CARDIOVERTER-DEFIBRILLATOR IN SITU: Primary | ICD-10-CM

## 2017-11-06 LAB
EKG ATRIAL RATE: 115 BPM
EKG ATRIAL RATE: 93 BPM
EKG DIAGNOSIS: NORMAL
EKG DIAGNOSIS: NORMAL
EKG Q-T INTERVAL: 440 MS
EKG Q-T INTERVAL: 486 MS
EKG QRS DURATION: 138 MS
EKG QRS DURATION: 176 MS
EKG QTC CALCULATION (BAZETT): 532 MS
EKG QTC CALCULATION (BAZETT): 591 MS
EKG R AXIS: 126 DEGREES
EKG R AXIS: 267 DEGREES
EKG T AXIS: 101 DEGREES
EKG T AXIS: 208 DEGREES
EKG VENTRICULAR RATE: 88 BPM
EKG VENTRICULAR RATE: 89 BPM

## 2017-11-06 PROCEDURE — 93287 PERI-PX DEVICE EVAL & PRGR: CPT | Performed by: INTERNAL MEDICINE

## 2017-11-06 PROCEDURE — 93650 ICAR CATH ABLTJ AV NODE FUNC: CPT | Performed by: INTERNAL MEDICINE

## 2017-11-06 RX ORDER — TORSEMIDE 20 MG/1
40 TABLET ORAL DAILY
Qty: 60 TABLET | Refills: 0 | Status: SHIPPED | OUTPATIENT
Start: 2017-11-06 | End: 2018-01-08 | Stop reason: SDUPTHER

## 2017-11-06 RX ORDER — SODIUM CHLORIDE 0.9 % (FLUSH) 0.9 %
10 SYRINGE (ML) INJECTION PRN
Status: DISCONTINUED | OUTPATIENT
Start: 2017-11-06 | End: 2017-11-07 | Stop reason: HOSPADM

## 2017-11-06 RX ORDER — ACETAMINOPHEN 325 MG/1
650 TABLET ORAL EVERY 4 HOURS PRN
Status: DISCONTINUED | OUTPATIENT
Start: 2017-11-06 | End: 2017-11-07 | Stop reason: HOSPADM

## 2017-11-06 RX ORDER — SODIUM CHLORIDE 0.9 % (FLUSH) 0.9 %
10 SYRINGE (ML) INJECTION PRN
Status: DISCONTINUED | OUTPATIENT
Start: 2017-11-06 | End: 2017-11-06 | Stop reason: SDUPTHER

## 2017-11-06 RX ORDER — SODIUM CHLORIDE 0.9 % (FLUSH) 0.9 %
10 SYRINGE (ML) INJECTION EVERY 12 HOURS SCHEDULED
Status: DISCONTINUED | OUTPATIENT
Start: 2017-11-06 | End: 2017-11-07 | Stop reason: HOSPADM

## 2017-11-06 RX ORDER — SODIUM CHLORIDE 9 MG/ML
INJECTION, SOLUTION INTRAVENOUS CONTINUOUS
Status: DISCONTINUED | OUTPATIENT
Start: 2017-11-06 | End: 2017-11-06

## 2017-11-06 RX ORDER — SODIUM CHLORIDE 0.9 % (FLUSH) 0.9 %
10 SYRINGE (ML) INJECTION EVERY 12 HOURS SCHEDULED
Status: DISCONTINUED | OUTPATIENT
Start: 2017-11-06 | End: 2017-11-06 | Stop reason: SDUPTHER

## 2017-11-06 NOTE — H&P
needed (take as needed for weight gain of 2 pounds in 48 hours, make take daily as needed). 4/8/14   Yes Paige Garcia MD   NOVOLOG FLEXPEN 100 UNIT/ML injection INJECT 20-25 UNITS AS PER SLIDING SCALE WITH [facility] MEAL 12/6/13   Yes John Gale DO   aspirin EC 81 MG EC tablet Take 1 tablet by mouth daily. 12/27/12   Yes Paige Garcia MD   insulin aspart (NOVOLOG) 100 UNIT/ML injection 20-25 units per meal as per sliding scale 7/11/11 6/5/13   John Gale, DO            Social History:   reports that he quit smoking about 50 years ago. He has never used smokeless tobacco. He reports that he does not drink alcohol or use drugs.         Family History:  family history includes Cancer in his brother and mother; Diabetes in his mother and sister. Reviewed. Denies family history of sudden cardiac death, arrhythmia, premature CAD     Review of System:     · General ROS: negative for - chills, fever   · Psychological ROS: negative for - anxiety or depression  · Ophthalmic ROS: negative for - eye pain or loss of vision  · ENT ROS: negative for - epistaxis, headaches, nasal discharge, sore throat   · Allergy and Immunology ROS: negative for - hives, nasal congestion   · Hematological and Lymphatic ROS: negative for - bleeding problems, blood clots, bruising or jaundice  · Endocrine ROS: negative for - skin changes, temperature intolerance or unexpected weight changes  · Respiratory ROS: negative for - cough, hemoptysis, pleuritic pain, SOB, sputum changes or wheezing  · Cardiovascular ROS: Per HPI.    · Gastrointestinal ROS: negative for - abdominal pain, blood in stools, diarrhea, hematemesis, melena,     nausea/vomiting or swallowing           difficulty/pain  · Genito-Urinary ROS: negative for - dysuria or incontinence  · Musculoskeletal ROS: negative for - joint swelling or muscle pain  · Neurological ROS: negative for - confusion, dizziness, gait disturbance, headaches, numbness/tingling, seizures,       speech problems,       tremors, visual changes or weakness  · Dermatological ROS: negative for - rash     Physical Examination:  Vitals:     10/23/17 1138   BP: 128/72   Pulse: 88   SpO2: 95%         · Constitutional: Oriented. No distress. · Head: Normocephalic and atraumatic. · Mouth/Throat: Oropharynx is clear and moist.   · Eyes: Conjunctivae normal. EOM are normal.   · Neck: Normal range of motion. Neck supple. No rigidity. No JVD present. · Cardiovascular: Normal rate, irregular rhythm, S1&S2 and intact distal pulses. · Pulmonary/Chest: Bilateral respiratory sounds. No wheezes. No rhonchi. · Abdominal: Soft. Bowel sounds present. No distension, No tenderness. · Musculoskeletal: No tenderness. 1+ BLE edema R >L  · Lymphadenopathy: Has no cervical adenopathy. · Neurological: Alert and oriented. Cranial nerve appears intact, No Gross deficit   · Skin: Skin is warm and dry. No rash noted. · Psychiatric: Has a normal mood, affect and behavior      Labs:  Reviewed. Cr 1.5 (8/2017)  LDL 72 (8/2017)     ECG: reviewed, 9/30/16 paced, PVC's     Limited echocardiogram 4/16/14:  Global ejection fraction is moderate-to-severely decreased and estimated  from 30 % to 35 %. Abnormal (paradoxical) septal motion is present. Severe Anterior and  anterospeptal wall hypokinesis  Mild mitral regurgitation is present. Normal right ventricular size and function. There is trace tricuspid regurgitation with RVSP estimated at 40 mmHg  assuming a right atrial pressure of 5mmHg. This is suggestive of mild pulmonary hypertension.     Echo: 2/2015  Global ejection fraction is moderate-to-severely decreased and estimated from 30 % to 35 %. Abnormal (paradoxical) septal motion is present likely due to . Severe anterior and anteroseptal hypokinesis c/w ischemic cardiomyopathy. As reported on 4-  Mild mitral regurgitation is present.   Mildly dilated left atrium.     Cath: 2/2015  1. Successful percutaneous coronary intervention using a 2.75-mm Xience drug-eluting stent in an 80% mid left anterior descending artery lesion that was moderately calcified. This was dilated to 2.91 mm in diameter. Successful stenting with a 2.75 mm x 18 mm Xience drug-eluting stent in the ostial 80% left anterior descending artery lesion. Kissing balloon technique was utilized in the ostial circumflex artery and left anterior descending  artery stents, resulting in 0% residual stenosis and DEN 3 flow. 2. In the dominant right coronary artery, successfully intervened upon with a 3 mm x 38 mm Xience drug-eluting stent dilated to 3.20 mm. There was DEN 3 flow in the vessel and 0% residual stenosis. 3. Otherwise, in the left system, there is an 80% hazy first diagonal branch lesion. There was 30% restenosis in the proximal circumflex artery stent and diffuse disease otherwise. There is a residual 70% lesion in the distal left  anterior descending artery. 4. Moderately elevated left ventricular end-diastolic pressure of 25 mmHg.   5. Moderately severe left ventricular systolic dysfunction with global hypokinesis and left ventricular ejection fraction of 30%.     Assessment:         Patient Active Problem List     Diagnosis Date Noted    Chest pain 02/11/2015       Priority: High    Primary insomnia 02/28/2017    Chronic systolic heart failure ([facility] 07/06/2016    Automatic implantable cardioverter-defibrillator in situ 09/30/2015    Insomnia 05/05/2015    Anemia 02/17/2015    DM hyperosmolarity type II, uncontrolled ([address] 02/12/2015    AMI (acute myocardial infarction)      Hyperlipidemia 02/11/2015    DMII (diabetes mellitus, type 2) ([address] 02/11/2015    Elevated troponin 02/11/2015    Hypomagnesemia 02/11/2015    Atrial fibrillation ([address] 02/11/2015    NSTEMI (non-ST elevated myocardial infarction) ([address]      Cardiomyopathy ([address]      MIKEY (acute kidney injury) ([facility] [address] 02/10/2015    COPD (chronic obstructive pulmonary disease) ([facility] [address] 11/14/2013    CHF (congestive heart failure) ([facility] [address] 11/14/2013    Ischemic cardiomyopathy 08/08/2013    CAD (coronary artery disease) 01/30/2012    HTN (hypertension) 08/01/2011         Plan:  1)  Cardiomyopathy:  Ischemic. S/p Bi-V ICD placement. EF 30-35%. NYHA Class II-III on OMT for > 3- months. Device interrogated and functioning appropriately. Pacing 64.3% Optivol stable.     2) Afib:  Chronic. Asymptomatic. Continue BB. Trialed amiodarone which he did not tolerate and he opted to cancel DCCV. CHADS Vasc score of 5. Plavix was discontinued at a prior office visit (continue ASA) and continue Eliquis. Current Bi-V pacing is 64%. In order to optimize Bi-V therapy, I recommend an AV node ablation. The risks, benefits and alternatives of the ablation procedure were discussed with the patient. The risks including, but not limited to, the risks of bleeding, infection, radiation exposure, injury to vascular, cardiac and surrounding structures (including pneumothorax), stroke, cardiac perforation, tamponade, need for emergent open heart surgery, need for pacemaker implantation, myocardial infarction and death were discussed in detail. The patient opted to proceed with the ablation.    3)  HTN:  Controlled. Goal < 120/80. Continue current medications. Educated on the importance of a low Na diet. Monitor.    4) HLD: Continue statin.     5)  BLE edema/ ascites/ weight gain:  Improved. Continue to follow daily weights and utilize diuretics as ordered.       Follow up s/p procedure     I have discussed the plan of care with the primary care team.  Phil Purcell MD, PhD

## 2017-11-06 NOTE — ANESTHESIA PRE-OP
H&P Update    I have reviewed the history and physical and examined the patient and find no relevant changes. I have reviewed with the patient and/or family the risks, benefits, and alternatives to the procedure. Pre-sedation Assessment    Patient:  Wendy Knight   :   1945  Intended Procedure: EP procedure    Witness: Nurses notes reviewed and agreed. Medications reviewed  Allergies: Allergies   Allergen Reactions    Cipro Xr     Claritin [Loratadine]     Levofloxacin     Peppermint Flavor [Flavoring Agent] Swelling       Pre-Procedure Assessment/Plan:  ASA 4 - Patient with severe systemic disease that is a constant threat to life  Mallampati Score: III (soft palate, base of uvula visible)    Level of Sedation Plan: Moderate sedation    Post Procedure plan: Return to same level of care

## 2017-11-06 NOTE — PROCEDURES
[address]     Electrophysiology Procedure Note       Date of Procedure: 11/6/2017  Patient's Name: Adeline Mcgee  YOB: 1945   Medical Record Number: [id number]  Referring Physician: Ange Wolfe MD  Procedure Performed by: Austin Velez MD     Procedure performed:    · His Recording  · IV sedation  · Periprocedural device programming  · Radiofrequency ablation of AV node. Indications for procedure:     Adeline Mcgee 81 y.o. male  with PMH of documented chronic AFib with poor BiV pacing, referred AV node ablation. Details of Procedure: The risks, benefits and alternatives of the ablation procedure were discussed with the patient. The risks including, but not limited to, the risks of bleeding, infection, radiation exposure, injury to vascular, cardiac and surrounding structures (including pneumothorax), stroke, cardiac perforation, tamponade, need for emergent open heart surgery, need for pacemaker implantation, myocardial infarction and death were discussed in detail. The patient opted to proceed with the ablation. Written informed consent was signed and placed in the chart. The patient was brought to the electrophysiology lab in a fasting nonsedated state. IV sedation was provided with IV Versed and Fentanyl. Both groins were prepped and draped in the usual sterile fashion. After injection of 2% lidocaine in the one 8F sheath was introduced to the right femoral vein. Device was interrogated and therapies were disabled. Under fluoroscopy, we advanced the ablation catheter into the RV. His recording was completed. H-V interval of 78 msec. Ablation:   A 8mm ablation catheter was advanced into the HIS position. Radiofrequency lesions were delivered (61 W, 60 C)  with demonstration of CHB. ICD was programmed to VVIR 90 bpm.  Therapies enabled. Catheters and sheaths were removed. Sheaths were removed and hemostasis achieved with manual compression.   Patient tolerated the procedure and at the end of procedure no complication noted. Conclusion:  Successful ablation of AV node. PLAN:    Patient will be discharged home if remains stable. Patient will receive usual post ablation care. Will stop jeronimo blocking agents.

## 2017-11-14 ENCOUNTER — TELEPHONE (OUTPATIENT)
Dept: FAMILY MEDICINE CLINIC | Age: 72
End: 2017-11-14

## 2017-11-14 DIAGNOSIS — Z79.4 TYPE 2 DIABETES MELLITUS WITHOUT COMPLICATION, WITH LONG-TERM CURRENT USE OF INSULIN (HCC): ICD-10-CM

## 2017-11-14 DIAGNOSIS — E11.9 TYPE 2 DIABETES MELLITUS WITHOUT COMPLICATION, WITH LONG-TERM CURRENT USE OF INSULIN (HCC): ICD-10-CM

## 2017-11-14 NOTE — TELEPHONE ENCOUNTER
Lancets [facility] [address]  [phone] - F [phone]     Pt is calling for a refill on lantus to be sent to [facility] 90 day supply      [phone]

## 2017-11-24 RX ORDER — GABAPENTIN 100 MG/1
CAPSULE ORAL
Qty: 180 CAPSULE | Refills: 1 | Status: SHIPPED | OUTPATIENT
Start: 2017-11-24 | End: 2018-01-17 | Stop reason: ALTCHOICE

## 2017-11-24 RX ORDER — ATORVASTATIN CALCIUM 80 MG/1
TABLET, FILM COATED ORAL
Qty: 90 TABLET | Refills: 1 | Status: SHIPPED | OUTPATIENT
Start: 2017-11-24 | End: 2018-01-17 | Stop reason: ALTCHOICE

## 2017-12-06 ENCOUNTER — PROCEDURE VISIT (OUTPATIENT)
Dept: CARDIOLOGY CLINIC | Age: 72
End: 2017-12-06

## 2017-12-06 ENCOUNTER — OFFICE VISIT (OUTPATIENT)
Dept: CARDIOLOGY CLINIC | Age: 72
End: 2017-12-06

## 2017-12-06 VITALS
SYSTOLIC BLOOD PRESSURE: 138 MMHG | DIASTOLIC BLOOD PRESSURE: 80 MMHG | BODY MASS INDEX: 37.13 KG/M2 | HEART RATE: 50 BPM | HEIGHT: 68 IN | OXYGEN SATURATION: 94 % | WEIGHT: 245 LBS

## 2017-12-06 DIAGNOSIS — I42.9 CARDIOMYOPATHY, UNSPECIFIED TYPE (HCC): ICD-10-CM

## 2017-12-06 DIAGNOSIS — I50.22 CHRONIC SYSTOLIC HEART FAILURE (HCC): ICD-10-CM

## 2017-12-06 DIAGNOSIS — R40.0 DAYTIME SLEEPINESS: Primary | ICD-10-CM

## 2017-12-06 DIAGNOSIS — Z95.810 AUTOMATIC IMPLANTABLE CARDIOVERTER-DEFIBRILLATOR IN SITU: ICD-10-CM

## 2017-12-06 DIAGNOSIS — I48.21 PERMANENT ATRIAL FIBRILLATION (HCC): ICD-10-CM

## 2017-12-06 PROCEDURE — 4040F PNEUMOC VAC/ADMIN/RCVD: CPT | Performed by: NURSE PRACTITIONER

## 2017-12-06 PROCEDURE — G8417 CALC BMI ABV UP PARAM F/U: HCPCS | Performed by: NURSE PRACTITIONER

## 2017-12-06 PROCEDURE — 93284 PRGRMG EVAL IMPLANTABLE DFB: CPT | Performed by: INTERNAL MEDICINE

## 2017-12-06 PROCEDURE — 3017F COLORECTAL CA SCREEN DOC REV: CPT | Performed by: NURSE PRACTITIONER

## 2017-12-06 PROCEDURE — 1123F ACP DISCUSS/DSCN MKR DOCD: CPT | Performed by: NURSE PRACTITIONER

## 2017-12-06 PROCEDURE — 93290 INTERROG DEV EVAL ICPMS IP: CPT | Performed by: INTERNAL MEDICINE

## 2017-12-06 PROCEDURE — G8427 DOCREV CUR MEDS BY ELIG CLIN: HCPCS | Performed by: NURSE PRACTITIONER

## 2017-12-06 PROCEDURE — G8598 ASA/ANTIPLAT THER USED: HCPCS | Performed by: NURSE PRACTITIONER

## 2017-12-06 PROCEDURE — G8484 FLU IMMUNIZE NO ADMIN: HCPCS | Performed by: NURSE PRACTITIONER

## 2017-12-06 PROCEDURE — 1036F TOBACCO NON-USER: CPT | Performed by: NURSE PRACTITIONER

## 2017-12-06 PROCEDURE — 99213 OFFICE O/P EST LOW 20 MIN: CPT | Performed by: NURSE PRACTITIONER

## 2017-12-06 NOTE — PROGRESS NOTES
Patient comes in for programming evaluation for his defibrillator. All sensing and pacing parameters are within normal range. No changes need to be made at this time. Please see interrogation for more detail. Patient will follow up in 3 months in office or remotely. Optivol is within normal range.

## 2017-12-06 NOTE — PROGRESS NOTES
Ht 5' 8\" (1.727 m)   Wt 245 lb (111.1 kg) Comment: did not wish to remove shoes  SpO2 94%   BMI 37.25 kg/m²     · Constitutional: Oriented. No distress. · Head: Normocephalic and atraumatic. · Mouth/Throat: Oropharynx is clear and moist.   · Eyes: Conjunctivae normal. EOM are normal.   · Neck: Normal range of motion. Neck supple. No rigidity. No JVD present. · Cardiovascular: Normal rate, regular rhythm, S1&S2 and intact distal pulses. · Pulmonary/Chest: Bilateral respiratory sounds. No wheezes. No rhonchi. · Abdominal: Soft. Bowel sounds present. No distension, No tenderness. · Musculoskeletal: No tenderness. No edema    · Neurological: Alert and oriented. Cranial nerve appears intact, No Gross deficit   · Skin: Skin is warm and dry. No rash noted. · Psychiatric: Has a normal mood, affect and behavior       Labs:  Lab Results   Component Value Date    CREATININE 1.5 (H) 10/31/2017    BUN 37 (H) 10/31/2017     10/31/2017    K 4.0 10/31/2017    CL 96 (L) 10/31/2017    CO2 29 10/31/2017     Echo: 2/2015  Global ejection fraction is moderate-to-severely decreased and estimated from 30 % to 35 %. Abnormal (paradoxical) septal motion is present likely due to . Severe anterior and anteroseptal hypokinesis c/w ischemic cardiomyopathy. As reported on 4-  Mild mitral regurgitation is present. Mildly dilated left atrium.     Cath: 2/2015  1. Successful percutaneous coronary intervention using a 2.75-mm Xience drug-eluting stent in an 80% mid left anterior descending artery lesion that was moderately calcified. This was dilated to 2.91 mm in diameter. Successful stenting with a 2.75 mm x 18 mm Xience drug-eluting stent in the ostial 80% left anterior descending artery lesion. Kissing balloon technique was utilized in the ostial circumflex artery and left anterior descending  artery stents, resulting in 0% residual stenosis and DEN 3 flow.   2. In the dominant right coronary artery, successfully intervened upon with a 3 mm x 38 mm Xience drug-eluting stent dilated to 3.20 mm. There was DEN 3 flow in the vessel and 0% residual stenosis. 3. Otherwise, in the left system, there is an 80% hazy first diagonal branch lesion. There was 30% restenosis in the proximal circumflex artery stent and diffuse disease otherwise. There is a residual 70% lesion in the distal left  anterior descending artery. 4. Moderately elevated left ventricular end-diastolic pressure of 25 mmHg. 5. Moderately severe left ventricular systolic dysfunction with global hypokinesis and left ventricular ejection fraction of 30%  . Device:   Device interrogated and functioning appropriately      Assessment:   Patient Active Problem List    Diagnosis Date Noted    Chest pain 02/11/2015     Priority: High    Primary insomnia 02/28/2017    [facility] 07/06/2016    Automatic implantable cardioverter-defibrillator in situ 09/30/2015    Insomnia 05/05/2015    Anemia 02/17/2015    DM hyperosmolarity type II, uncontrolled ([facility] [facility] 02/12/2015    AMI (acute myocardial infarction)     Hyperlipidemia 02/11/2015    DMII (diabetes mellitus, type 2) ([facility] [address] 02/11/2015    Elevated troponin 02/11/2015    Hypomagnesemia 02/11/2015    Chronic atrial fibrillation ([facility] [address] 02/11/2015    NSTEMI (non-ST elevated myocardial infarction) ([facility] [facility]     Cardiomyopathy ([facility] [address] [facility]     MIKEY (acute kidney injury) ([facility] [address] 02/10/2015    COPD (chronic obstructive pulmonary disease) ([facility] [address] [facility] 11/14/2013    CHF (congestive heart failure) ([facility] [address] [facility] 11/14/2013    Ischemic cardiomyopathy 08/08/2013    CAD (coronary artery disease) 01/30/2012    HTN (hypertension) 08/01/2011        Plan:  1. Atrial Fibrillation    S/p AV jeronimo ablation   chronic   PPM dependent   Continue anticoagulation therapy   2. Cardiomyopathy   Ischemic   EF 30-35%   Compensated on exam   Continue medical management   3. HTN   Stable  4.  Edema   Intermittent   Improved post procedure   Fluid restrictions, daily weights, compression stockings   Diuretic therapy     Thank you for allowing me to participate in the care of Nadine Berrios. Further evaluation will be based upon the patient's clinical course and testing results. All questions and concerns were addressed to the patient/family. Alternatives to my treatment were discussed. Eduardo Patrick, [address]  Electrophysiology   12/6/2017  10:06 AM        Patient instructed on life style modifications   Tobacco use was discussed with the patient and patient educated on the negative effects. I have asked the patient to not utilize these agents.       FASTING LIPID PANEL:  Lab Results   Component Value Date    HDL 33 08/28/2017    HDL 40 03/31/2012    LDLCALC 72 08/28/2017    TRIG 147 08/28/2017

## 2017-12-08 ENCOUNTER — OFFICE VISIT (OUTPATIENT)
Dept: FAMILY MEDICINE CLINIC | Age: 72
End: 2017-12-08

## 2017-12-08 ENCOUNTER — TELEPHONE (OUTPATIENT)
Dept: FAMILY MEDICINE CLINIC | Age: 72
End: 2017-12-08

## 2017-12-08 VITALS
TEMPERATURE: 97.4 F | BODY MASS INDEX: 36.98 KG/M2 | WEIGHT: 244 LBS | HEART RATE: 56 BPM | HEIGHT: 68 IN | DIASTOLIC BLOOD PRESSURE: 70 MMHG | SYSTOLIC BLOOD PRESSURE: 122 MMHG

## 2017-12-08 DIAGNOSIS — Z79.899 MEDICATION MANAGEMENT: Primary | ICD-10-CM

## 2017-12-08 DIAGNOSIS — F51.01 PRIMARY INSOMNIA: ICD-10-CM

## 2017-12-08 DIAGNOSIS — Z79.4 TYPE 2 DIABETES MELLITUS WITH HYPEROSMOLARITY WITHOUT COMA, WITH LONG-TERM CURRENT USE OF INSULIN (HCC): Primary | ICD-10-CM

## 2017-12-08 DIAGNOSIS — E11.00 TYPE 2 DIABETES MELLITUS WITH HYPEROSMOLARITY WITHOUT COMA, WITH LONG-TERM CURRENT USE OF INSULIN (HCC): Primary | ICD-10-CM

## 2017-12-08 PROCEDURE — 1036F TOBACCO NON-USER: CPT | Performed by: FAMILY MEDICINE

## 2017-12-08 PROCEDURE — 4040F PNEUMOC VAC/ADMIN/RCVD: CPT | Performed by: FAMILY MEDICINE

## 2017-12-08 PROCEDURE — 99213 OFFICE O/P EST LOW 20 MIN: CPT | Performed by: FAMILY MEDICINE

## 2017-12-08 PROCEDURE — G8598 ASA/ANTIPLAT THER USED: HCPCS | Performed by: FAMILY MEDICINE

## 2017-12-08 PROCEDURE — G8484 FLU IMMUNIZE NO ADMIN: HCPCS | Performed by: FAMILY MEDICINE

## 2017-12-08 PROCEDURE — G8428 CUR MEDS NOT DOCUMENT: HCPCS | Performed by: FAMILY MEDICINE

## 2017-12-08 PROCEDURE — G8417 CALC BMI ABV UP PARAM F/U: HCPCS | Performed by: FAMILY MEDICINE

## 2017-12-08 PROCEDURE — 3017F COLORECTAL CA SCREEN DOC REV: CPT | Performed by: FAMILY MEDICINE

## 2017-12-08 PROCEDURE — 1123F ACP DISCUSS/DSCN MKR DOCD: CPT | Performed by: FAMILY MEDICINE

## 2017-12-08 RX ORDER — ALPRAZOLAM 0.5 MG/1
TABLET ORAL
Qty: 30 TABLET | Refills: 0 | Status: SHIPPED | OUTPATIENT
Start: 2017-12-08 | End: 2017-12-08 | Stop reason: SDUPTHER

## 2017-12-08 RX ORDER — ALPRAZOLAM 0.5 MG/1
TABLET ORAL
Qty: 30 TABLET | Refills: 0 | Status: SHIPPED | OUTPATIENT
Start: 2017-12-08 | End: 2017-12-15 | Stop reason: SDUPTHER

## 2017-12-08 NOTE — PATIENT INSTRUCTIONS
Please call your pharmacy if you need any refills of your medication(s). Please call our office at [phone] if you don't hear from us about your test results. Please be sure to call our office if your illness/problem has been treated for but has not completely resolved. Bring an accurate list of your medications with you at every appointment to ensure that we have the correct information. Our office hours are: Monday - Friday 7 am- 5 pm; Saturdays vary on doctor working.     Phone lines turn on at 8 am.

## 2017-12-12 RX ORDER — APIXABAN 5 MG/1
5 TABLET, FILM COATED ORAL 2 TIMES DAILY
Qty: 180 TABLET | Refills: 3 | Status: SHIPPED | OUTPATIENT
Start: 2017-12-12 | End: 2018-02-26 | Stop reason: SDUPTHER

## 2017-12-13 LAB
6-ACETYLMORPHINE: NOT DETECTED
7-AMINOCLONAZEPAM: NOT DETECTED
ALPHA-OH-ALPRAZOLAM: PRESENT
ALPRAZOLAM: PRESENT
AMPHETAMINE: NOT DETECTED
BARBITURATES: NOT DETECTED
BENZOYLECGONINE: NOT DETECTED
BUPRENORPHINE: NOT DETECTED
CARISOPRODOL: NOT DETECTED
CLONAZEPAM: NOT DETECTED
CODEINE: NOT DETECTED
CREATININE URINE: 125.2 MG/DL (ref 20–400)
DIAZEPAM: NOT DETECTED
DRUGS EXPECTED: NORMAL
EER PAIN MGT DRUG PANEL, HIGH RES/EMIT U: NORMAL
ETHYL GLUCURONIDE: NOT DETECTED
FENTANYL: NOT DETECTED
HYDROCODONE: NOT DETECTED
HYDROMORPHONE: NOT DETECTED
LORAZEPAM: NOT DETECTED
MARIJUANA METABOLITE: NOT DETECTED
MDA: NOT DETECTED
MDEA: NOT DETECTED
MDMA URINE: NOT DETECTED
MEPERIDINE: NOT DETECTED
METHADONE: NOT DETECTED
METHAMPHETAMINE: NOT DETECTED
METHYLPHENIDATE: NOT DETECTED
MIDAZOLAM: NOT DETECTED
MORPHINE: NOT DETECTED
NORBUPRENORPHINE, FREE: NOT DETECTED
NORDIAZEPAM: NOT DETECTED
NORFENTANYL: NOT DETECTED
NORHYDROCODONE, URINE: NOT DETECTED
NOROXYCODONE: NOT DETECTED
NOROXYMORPHONE, URINE: NOT DETECTED
OXAZEPAM: NOT DETECTED
OXYCODONE: NOT DETECTED
OXYMORPHONE: NOT DETECTED
PAIN MANAGEMENT DRUG PANEL: NORMAL
PAIN MANAGEMENT DRUG PANEL: NORMAL
PCP: NOT DETECTED
PHENTERMINE: NOT DETECTED
PROPOXYPHENE: NOT DETECTED
TAPENTADOL, URINE: NOT DETECTED
TAPENTADOL-O-SULFATE, URINE: NOT DETECTED
TEMAZEPAM: NOT DETECTED
TRAMADOL: NOT DETECTED
ZOLPIDEM: NOT DETECTED

## 2017-12-15 ENCOUNTER — TELEPHONE (OUTPATIENT)
Dept: FAMILY MEDICINE CLINIC | Age: 72
End: 2017-12-15

## 2017-12-15 RX ORDER — ALPRAZOLAM 0.5 MG/1
TABLET ORAL
Qty: 30 TABLET | Refills: 0 | Status: SHIPPED | OUTPATIENT
Start: 2017-12-15 | End: 2018-03-15 | Stop reason: SDUPTHER

## 2017-12-21 ASSESSMENT — ENCOUNTER SYMPTOMS: SHORTNESS OF BREATH: 0

## 2018-01-04 DIAGNOSIS — Z79.4 TYPE 2 DIABETES MELLITUS WITH HYPEROSMOLARITY WITHOUT COMA, WITH LONG-TERM CURRENT USE OF INSULIN (HCC): ICD-10-CM

## 2018-01-04 DIAGNOSIS — E11.00 TYPE 2 DIABETES MELLITUS WITH HYPEROSMOLARITY WITHOUT COMA, WITH LONG-TERM CURRENT USE OF INSULIN (HCC): ICD-10-CM

## 2018-01-04 LAB
ANION GAP SERPL CALCULATED.3IONS-SCNC: 18 MMOL/L (ref 3–16)
BUN BLDV-MCNC: 43 MG/DL (ref 7–20)
CALCIUM SERPL-MCNC: 9.6 MG/DL (ref 8.3–10.6)
CHLORIDE BLD-SCNC: 94 MMOL/L (ref 99–110)
CO2: 30 MMOL/L (ref 21–32)
CREAT SERPL-MCNC: 1.8 MG/DL (ref 0.8–1.3)
GFR AFRICAN AMERICAN: 45
GFR NON-AFRICAN AMERICAN: 37
GLUCOSE BLD-MCNC: 126 MG/DL (ref 70–99)
POTASSIUM SERPL-SCNC: 4 MMOL/L (ref 3.5–5.1)
SODIUM BLD-SCNC: 142 MMOL/L (ref 136–145)

## 2018-01-05 LAB
ESTIMATED AVERAGE GLUCOSE: 237.4 MG/DL
HBA1C MFR BLD: 9.9 %

## 2018-01-08 ENCOUNTER — OFFICE VISIT (OUTPATIENT)
Dept: FAMILY MEDICINE CLINIC | Age: 73
End: 2018-01-08

## 2018-01-08 VITALS
HEART RATE: 64 BPM | BODY MASS INDEX: 37.86 KG/M2 | DIASTOLIC BLOOD PRESSURE: 70 MMHG | TEMPERATURE: 98.1 F | SYSTOLIC BLOOD PRESSURE: 124 MMHG | WEIGHT: 249.8 LBS | HEIGHT: 68 IN

## 2018-01-08 DIAGNOSIS — R79.89 ELEVATED SERUM CREATININE: ICD-10-CM

## 2018-01-08 DIAGNOSIS — R79.89 ELEVATED SERUM CREATININE: Primary | ICD-10-CM

## 2018-01-08 LAB
ANION GAP SERPL CALCULATED.3IONS-SCNC: 21 MMOL/L (ref 3–16)
BUN BLDV-MCNC: 41 MG/DL (ref 7–20)
CALCIUM SERPL-MCNC: 9.4 MG/DL (ref 8.3–10.6)
CHLORIDE BLD-SCNC: 95 MMOL/L (ref 99–110)
CO2: 27 MMOL/L (ref 21–32)
CREAT SERPL-MCNC: 1.6 MG/DL (ref 0.8–1.3)
GFR AFRICAN AMERICAN: 52
GFR NON-AFRICAN AMERICAN: 43
GLUCOSE BLD-MCNC: 107 MG/DL (ref 70–99)
POTASSIUM SERPL-SCNC: 3.9 MMOL/L (ref 3.5–5.1)
SODIUM BLD-SCNC: 143 MMOL/L (ref 136–145)

## 2018-01-08 PROCEDURE — G8427 DOCREV CUR MEDS BY ELIG CLIN: HCPCS | Performed by: FAMILY MEDICINE

## 2018-01-08 PROCEDURE — 4040F PNEUMOC VAC/ADMIN/RCVD: CPT | Performed by: FAMILY MEDICINE

## 2018-01-08 PROCEDURE — 1123F ACP DISCUSS/DSCN MKR DOCD: CPT | Performed by: FAMILY MEDICINE

## 2018-01-08 PROCEDURE — G8598 ASA/ANTIPLAT THER USED: HCPCS | Performed by: FAMILY MEDICINE

## 2018-01-08 PROCEDURE — 1036F TOBACCO NON-USER: CPT | Performed by: FAMILY MEDICINE

## 2018-01-08 PROCEDURE — 99213 OFFICE O/P EST LOW 20 MIN: CPT | Performed by: FAMILY MEDICINE

## 2018-01-08 PROCEDURE — G8484 FLU IMMUNIZE NO ADMIN: HCPCS | Performed by: FAMILY MEDICINE

## 2018-01-08 PROCEDURE — 3017F COLORECTAL CA SCREEN DOC REV: CPT | Performed by: FAMILY MEDICINE

## 2018-01-08 PROCEDURE — G8417 CALC BMI ABV UP PARAM F/U: HCPCS | Performed by: FAMILY MEDICINE

## 2018-01-08 RX ORDER — TORSEMIDE 20 MG/1
40 TABLET ORAL DAILY
Qty: 180 TABLET | Refills: 2 | Status: SHIPPED | OUTPATIENT
Start: 2018-01-08 | End: 2018-01-17 | Stop reason: ALTCHOICE

## 2018-01-09 ENCOUNTER — OFFICE VISIT (OUTPATIENT)
Dept: FAMILY MEDICINE CLINIC | Age: 73
End: 2018-01-09

## 2018-01-09 VITALS
TEMPERATURE: 98.4 F | SYSTOLIC BLOOD PRESSURE: 114 MMHG | HEART RATE: 58 BPM | DIASTOLIC BLOOD PRESSURE: 61 MMHG | WEIGHT: 249 LBS | HEIGHT: 68 IN | BODY MASS INDEX: 37.74 KG/M2

## 2018-01-09 DIAGNOSIS — R80.9 URINE TEST POSITIVE FOR MICROALBUMINURIA: ICD-10-CM

## 2018-01-09 DIAGNOSIS — R74.8 ELEVATED CREATINE KINASE: Primary | ICD-10-CM

## 2018-01-09 PROCEDURE — 99213 OFFICE O/P EST LOW 20 MIN: CPT | Performed by: FAMILY MEDICINE

## 2018-01-09 PROCEDURE — 4040F PNEUMOC VAC/ADMIN/RCVD: CPT | Performed by: FAMILY MEDICINE

## 2018-01-09 PROCEDURE — G8484 FLU IMMUNIZE NO ADMIN: HCPCS | Performed by: FAMILY MEDICINE

## 2018-01-09 PROCEDURE — G8417 CALC BMI ABV UP PARAM F/U: HCPCS | Performed by: FAMILY MEDICINE

## 2018-01-09 PROCEDURE — G8427 DOCREV CUR MEDS BY ELIG CLIN: HCPCS | Performed by: FAMILY MEDICINE

## 2018-01-09 PROCEDURE — 3017F COLORECTAL CA SCREEN DOC REV: CPT | Performed by: FAMILY MEDICINE

## 2018-01-09 PROCEDURE — G8598 ASA/ANTIPLAT THER USED: HCPCS | Performed by: FAMILY MEDICINE

## 2018-01-09 PROCEDURE — 1036F TOBACCO NON-USER: CPT | Performed by: FAMILY MEDICINE

## 2018-01-09 PROCEDURE — 1123F ACP DISCUSS/DSCN MKR DOCD: CPT | Performed by: FAMILY MEDICINE

## 2018-01-09 NOTE — PROGRESS NOTES
1/9/2018    Edwardo Vela is a 81 y.o. male    Chief Complaint   Patient presents with    Diabetes      Diabetes education and medication review,        SUBJECTIVE  HPI   Edwardo Vela is here today with his meds -- so we can get an accurate list    His otg this am was 83,   His 2 hour pp was 61      Review of Systems   Constitutional: Negative for chills, fatigue and fever. Respiratory: Negative for shortness of breath. Cardiovascular: Negative for chest pain and leg swelling. OBJECTIVE  Physical Exam   Constitutional: He appears well-developed and well-nourished. Reviewed current labs with pt. Neck: Normal range of motion. Neck supple. No thyromegaly present. Cardiovascular: Normal rate and regular rhythm. Pulmonary/Chest: Effort normal and breath sounds normal.   Musculoskeletal: Normal range of motion. He exhibits no edema. Vitals reviewed. Allergies  Cipro xr; Claritin [loratadine]; Levofloxacin; and Peppermint flavor [flavoring agent]    Current Outpatient Prescriptions   Medication Sig Dispense Refill    insulin lispro (HUMALOG) 100 UNIT/ML injection vial 25 units tid with food 1 vial 3    insulin glargine (LANTUS SOLOSTAR) 100 UNIT/ML injection pen 90 units sq qd 30 pen 5    torsemide (DEMADEX) 20 MG tablet Take 2 tablets by mouth daily 180 tablet 2    ALPRAZolam (XANAX) 0.5 MG tablet 1 po at hs prn anxiety. No etoh. No driving. Do NOT take with ambien. . 30 tablet 0    ELIQUIS 5 MG TABS tablet TAKE 1 TABLET BY MOUTH 2 TIMES DAILY 180 tablet 3    metFORMIN (GLUCOPHAGE) 500 MG tablet Take 1 tablet by mouth 2 times daily (with meals) 180 tablet 0    atorvastatin (LIPITOR) 80 MG tablet TAKE 1 TABLET EVERY DAY 90 tablet 1    gabapentin (NEURONTIN) 100 MG capsule TAKE 2 CAPSULES EVERY  capsule 1    metoprolol succinate (TOPROL XL) 100 MG extended release tablet TAKE 1 TABLET EVERY DAY 30 tablet 0    ramipril (ALTACE) 10 MG capsule TAKE 1 CAPSULE EVERY DAY

## 2018-01-09 NOTE — PATIENT INSTRUCTIONS
Please call your pharmacy if you need any refills of your medication(s). Please call our office at [phone] if you don't hear from us about your test results. Please be sure to call our office if your illness/problem has been treated for but has not completely resolved. Bring an accurate list of your medications with you at every appointment to ensure that we have the correct information. Our office hours are: Monday - Friday 7 am- 5 pm; Saturdays vary on doctor working.     Phone lines turn on at 8 am.

## 2018-01-15 ENCOUNTER — TELEPHONE (OUTPATIENT)
Dept: FAMILY MEDICINE CLINIC | Age: 73
End: 2018-01-15

## 2018-01-15 RX ORDER — AMOXICILLIN 500 MG/1
500 CAPSULE ORAL 3 TIMES DAILY
Qty: 30 CAPSULE | Refills: 0 | Status: ON HOLD | OUTPATIENT
Start: 2018-01-15 | End: 2018-01-21 | Stop reason: HOSPADM

## 2018-01-15 RX ORDER — RAMIPRIL 10 MG/1
CAPSULE ORAL
Qty: 90 CAPSULE | Refills: 1 | Status: ON HOLD | OUTPATIENT
Start: 2018-01-15 | End: 2018-01-21 | Stop reason: HOSPADM

## 2018-01-15 ASSESSMENT — ENCOUNTER SYMPTOMS
SHORTNESS OF BREATH: 0
SHORTNESS OF BREATH: 0

## 2018-01-15 NOTE — TELEPHONE ENCOUNTER
Pt would like to know if something could be called in      Sore throat   Cough - white thick mucus   Nasal congestion    Pl advise.    [address] [facility]

## 2018-01-15 NOTE — PROGRESS NOTES
Oxycodone 12/13/2017 Not Detected     Noroxycodone 12/13/2017 Not Detected     Oxymorphone 12/13/2017 Not Detected     NOROXYMORPHONE, URINE 12/13/2017 Not Detected     Hydrocodone 12/13/2017 Not Detected     NORHYDROCODONE, URINE 12/13/2017 Not Detected     Hydromorphone 12/13/2017 Not Detected     Buprenorphine 12/13/2017 Not Detected     Norbuprenorphine 12/13/2017 Not Detected     Fentanyl 12/13/2017 Not Detected     Norfentanyl 12/13/2017 Not Detected     Meperidine 12/13/2017 Not Detected     Tapentadol, Urine 12/13/2017 Not Detected     Tapentadol-O-Sulfate, Ur* 12/13/2017 Not Detected     Methadone 12/13/2017 Not Detected     Propoxyphene 12/13/2017 Not Detected     Tramadol 12/13/2017 Not Detected     Amphetamine 12/13/2017 Not Detected     Methamphetamine 12/13/2017 Not Detected     MDMA URINE 12/13/2017 Not Detected     MDA 12/13/2017 Not Detected     MDEA 12/13/2017 Not Detected     Methylphenidate 12/13/2017 Not Detected     Phentermine 12/13/2017 Not Detected     Benzoylecgonine 12/13/2017 Not Detected     Alprazolam 12/13/2017 Present     Alpha-OH-alprazolam 12/13/2017 Present     Clonazepam 12/13/2017 Not Detected     7-aminoclonazepam 12/13/2017 Not Detected     Diazepam 12/13/2017 Not Detected     NORDIAZEPAM 12/13/2017 Not Detected     OXAZEPAM 12/13/2017 Not Detected     TEMAZEPAM 12/13/2017 Not Detected     Lorazepam 12/13/2017 Not Detected     Midazolam 12/13/2017 Not Detected     Zolpidem 12/13/2017 Not Detected     Barbiturates 12/13/2017 Not Detected     Ethyl Glucuronide 12/13/2017 Not Detected     Marijuana Metabolite 12/13/2017 Not Detected     PCP 12/13/2017 Not Detected     CARISOPRODOL 12/13/2017 Not Detected     Pain Management Drug Pan* 12/13/2017 See Below     [facility] Pain Mgt Drug Panel,* 12/13/2017 See Note              ASSESSMENT  1.  Elevated serum creatinine  Basic Metabolic Panel       PLAN  Recheck lab  He will decrease the water pill to 1 Syringe-Needle U-100 (INSULIN SYRINGE .5CC/30GX1/2\") 30G X 1/2\" 0.5 ML MISC Use 5 times a day with insulin. GENERIC IS OK. 200 each 12    Insulin Pen Needle (B-D ULTRAFINE III SHORT PEN) 31G X 8 MM MISC 1 each by Does not apply route daily 100 each 3    albuterol sulfate HFA (PROAIR HFA) 108 (90 BASE) MCG/ACT inhaler Inhale 2 puffs into the lungs every 4 hours as needed for Wheezing or Shortness of Breath 1 Inhaler 0    glucose blood VI test strips (ACCU-CHEK CARY) strip 1 each by In Vitro route daily As needed. DX: E11.9 100 each 3    Lancets MISC accu-check cary  DX: E11.9 100 each 3    Blood Glucose Monitoring Suppl (ACCU-CHEK CARY PLUS) W/DEVICE KIT 1 Device by Does not apply route 2 times daily Dx: E11.9 1 kit 0    Blood Glucose Calibration (ACCU-CHEK INSTANT CONTROL) LIQD 1 Bottle by In Vitro route 2 times daily 1 each 0    Alcohol Swabs (B-D SINGLE USE SWABS REGULAR) PADS 1 each by Does not apply route 2 times daily 100 each 0    [DISCONTINUED] sildenafil (VIAGRA) 50 MG tablet Take 1 tablet by mouth as needed for Erectile Dysfunction Take 1 hour prior to sexual intercourse. May take 1/2 tablet initially. If no improvement take an additional 1/2 tablet. 15 tablet 1    [DISCONTINUED] metolazone (ZAROXOLYN) 2.5 MG tablet Take 1 tablet by mouth as needed (take as needed for weight gain of 2 pounds in 48 hours, make take daily as needed). 90 tablet 3    [DISCONTINUED] torsemide (DEMADEX) 20 MG tablet Take 2 tablets by mouth daily 60 tablet 0    [DISCONTINUED] NOVOLOG FLEXPEN 100 UNIT/ML injection INJECT 20-25 UNITS AS PER SLIDING SCALE WITH EACH MEAL 30 Pen 11    [DISCONTINUED] insulin aspart (NOVOLOG) 100 UNIT/ML injection 20-25 units per meal as per sliding scale 10 Pen 12     No facility-administered encounter medications on file as of 1/8/2018.           Gabriel Kim D.O.

## 2018-01-17 ENCOUNTER — TELEPHONE (OUTPATIENT)
Dept: FAMILY MEDICINE CLINIC | Age: 73
End: 2018-01-17

## 2018-01-17 PROBLEM — I50.23 ACUTE ON CHRONIC SYSTOLIC HEART FAILURE (HCC): Status: ACTIVE | Noted: 2018-01-17

## 2018-01-22 ENCOUNTER — TELEPHONE (OUTPATIENT)
Dept: CARDIOLOGY CLINIC | Age: 73
End: 2018-01-22

## 2018-02-09 RX ORDER — BLOOD SUGAR DIAGNOSTIC
STRIP MISCELLANEOUS
Qty: 400 STRIP | Refills: 3 | Status: SHIPPED | OUTPATIENT
Start: 2018-02-09 | End: 2018-12-17 | Stop reason: SDUPTHER

## 2018-02-26 ENCOUNTER — TELEPHONE (OUTPATIENT)
Dept: CARDIOLOGY CLINIC | Age: 73
End: 2018-02-26

## 2018-02-26 ENCOUNTER — TELEPHONE (OUTPATIENT)
Dept: FAMILY MEDICINE CLINIC | Age: 73
End: 2018-02-26

## 2018-02-26 DIAGNOSIS — Z79.4 TYPE 2 DIABETES MELLITUS WITH HYPEROSMOLARITY WITHOUT COMA, WITH LONG-TERM CURRENT USE OF INSULIN (HCC): ICD-10-CM

## 2018-02-26 DIAGNOSIS — E11.00 TYPE 2 DIABETES MELLITUS WITH HYPEROSMOLARITY WITHOUT COMA, WITH LONG-TERM CURRENT USE OF INSULIN (HCC): ICD-10-CM

## 2018-02-26 DIAGNOSIS — E78.2 MIXED HYPERLIPIDEMIA: Primary | ICD-10-CM

## 2018-02-26 DIAGNOSIS — I10 ESSENTIAL HYPERTENSION: ICD-10-CM

## 2018-02-26 NOTE — TELEPHONE ENCOUNTER
Pt has an apt for March 13, 2018 for a hospital f/u and pt is wanting to know if you want any bw done prior to apt to test his sugar or anything? Pt is also wanting to know if he could get a statement from you stating that he is needing a handicap sticker. If so he can fax that over and get one order thru the internet instead of going to [address].    [phone] (home) [phone] (work)

## 2018-02-27 ENCOUNTER — OFFICE VISIT (OUTPATIENT)
Dept: CARDIOLOGY CLINIC | Age: 73
End: 2018-02-27

## 2018-02-27 VITALS
WEIGHT: 233 LBS | BODY MASS INDEX: 35.31 KG/M2 | DIASTOLIC BLOOD PRESSURE: 70 MMHG | SYSTOLIC BLOOD PRESSURE: 110 MMHG | OXYGEN SATURATION: 93 % | HEIGHT: 68 IN | HEART RATE: 105 BPM

## 2018-02-27 DIAGNOSIS — I25.10 CORONARY ARTERY DISEASE INVOLVING NATIVE CORONARY ARTERY OF NATIVE HEART WITHOUT ANGINA PECTORIS: ICD-10-CM

## 2018-02-27 DIAGNOSIS — I50.42 CHRONIC COMBINED SYSTOLIC AND DIASTOLIC CHF, NYHA CLASS 3 (HCC): Primary | ICD-10-CM

## 2018-02-27 DIAGNOSIS — I48.0 PAROXYSMAL ATRIAL FIBRILLATION (HCC): ICD-10-CM

## 2018-02-27 DIAGNOSIS — I10 ESSENTIAL HYPERTENSION: ICD-10-CM

## 2018-02-27 DIAGNOSIS — E78.5 HYPERLIPIDEMIA LDL GOAL <70: ICD-10-CM

## 2018-02-27 DIAGNOSIS — I50.42 CHRONIC COMBINED SYSTOLIC AND DIASTOLIC CHF, NYHA CLASS 3 (HCC): ICD-10-CM

## 2018-02-27 PROCEDURE — G8417 CALC BMI ABV UP PARAM F/U: HCPCS | Performed by: INTERNAL MEDICINE

## 2018-02-27 PROCEDURE — 4040F PNEUMOC VAC/ADMIN/RCVD: CPT | Performed by: INTERNAL MEDICINE

## 2018-02-27 PROCEDURE — 99214 OFFICE O/P EST MOD 30 MIN: CPT | Performed by: INTERNAL MEDICINE

## 2018-02-27 PROCEDURE — 1123F ACP DISCUSS/DSCN MKR DOCD: CPT | Performed by: INTERNAL MEDICINE

## 2018-02-27 PROCEDURE — 3017F COLORECTAL CA SCREEN DOC REV: CPT | Performed by: INTERNAL MEDICINE

## 2018-02-27 PROCEDURE — G8427 DOCREV CUR MEDS BY ELIG CLIN: HCPCS | Performed by: INTERNAL MEDICINE

## 2018-02-27 PROCEDURE — 1036F TOBACCO NON-USER: CPT | Performed by: INTERNAL MEDICINE

## 2018-02-27 PROCEDURE — G8484 FLU IMMUNIZE NO ADMIN: HCPCS | Performed by: INTERNAL MEDICINE

## 2018-02-27 PROCEDURE — G8598 ASA/ANTIPLAT THER USED: HCPCS | Performed by: INTERNAL MEDICINE

## 2018-02-27 PROCEDURE — 93000 ELECTROCARDIOGRAM COMPLETE: CPT | Performed by: INTERNAL MEDICINE

## 2018-02-27 NOTE — PATIENT INSTRUCTIONS
tell you to chew 1 adult-strength or 2 to 4 low-dose aspirin. Wait for an ambulance. Do not try to drive yourself. Follow-up care is a key part of your treatment and safety. Be sure to make and go to all appointments, and call your doctor if you are having problems. It's also a good idea to know your test results and keep a list of the medicines you take. Where can you learn more? Go to [email or web address]://[email or web address] org and sign in to your [employer] account. Enter T174 in the go2 media box to learn more about \"Learning About Heart Failure Zones. \"     If you do not have an account, please click on the \"Sign Up Now\" link. Current as of: September 21, 2016  Content Version: 11.5  © 7986-1594 Healthwise, [employer]. Care instructions adapted under license by [facility] ([facility]). If you have questions about a medical condition or this instruction, always ask your healthcare professional. [address] any warranty or liability for your use of this information. Patient Education        DASH Diet: Care Instructions  Your Care Instructions    The DASH diet is an eating plan that can help lower your blood pressure. DASH stands for Dietary Approaches to Stop Hypertension. Hypertension is high blood pressure. The DASH diet focuses on eating foods that are high in calcium, potassium, and magnesium. These nutrients can lower blood pressure. The foods that are highest in these nutrients are fruits, vegetables, low-fat dairy products, nuts, seeds, and legumes. But taking calcium, potassium, and magnesium supplements instead of eating foods that are high in those nutrients does not have the same effect. The DASH diet also includes whole grains, fish, and poultry. The DASH diet is one of several lifestyle changes your doctor may recommend to lower your high blood pressure. Your doctor may also want you to decrease the amount of sodium in your diet.  Lowering sodium while following the to make dramatic changes to your diet all at once. You might feel that you are missing out on your favorite foods and then be more likely to not follow the plan. Make small changes, and stick with them. Once those changes become habit, add a few more changes. · Try some of the following:  ¨ Make it a goal to eat a fruit or vegetable at every meal and at snacks. This will make it easy to get the recommended amount of fruits and vegetables each day. ¨ Try yogurt topped with fruit and nuts for a snack or healthy dessert. ¨ Add lettuce, tomato, cucumber, and onion to sandwiches. ¨ Combine a ready-made pizza crust with low-fat mozzarella cheese and lots of vegetable toppings. Try using tomatoes, squash, spinach, broccoli, carrots, cauliflower, and onions. ¨ Have a variety of cut-up vegetables with a low-fat dip as an appetizer instead of chips and dip. ¨ Sprinkle sunflower seeds or chopped almonds over salads. Or try adding chopped walnuts or almonds to cooked vegetables. ¨ Try some vegetarian meals using beans and peas. Add garbanzo or kidney beans to salads. Make burritos and tacos with mashed henriquez beans or black beans. Where can you learn more? Go to https://[employer].[email or web address] org and sign in to your [employer] account. Enter G881 in the [facility] box to learn more about \"DASH Diet: Care Instructions. \"     If you do not have an account, please click on the \"Sign Up Now\" link. Current as of: September 21, 2016  Content Version: 11.5  © 1871-0496 [employer]. Care instructions adapted under license by [facility] ([facility]). If you have questions about a medical condition or this instruction, always ask your healthcare professional. [address]  any warranty or liability for your use of this information.

## 2018-02-27 NOTE — PROGRESS NOTES
than he has in a while. His weight is down 16 pounds. I have encouraged him to continue making healthier dietary choices and monitoring his sodium intake as well as increases his daily aerobic exercise. I will have him complete a BMP and BNP today. I will see him in office for follow up in 3 months.

## 2018-02-28 LAB
ANION GAP SERPL CALCULATED.3IONS-SCNC: 17 MMOL/L (ref 3–16)
BUN BLDV-MCNC: 39 MG/DL (ref 7–20)
CALCIUM SERPL-MCNC: 9.2 MG/DL (ref 8.3–10.6)
CHLORIDE BLD-SCNC: 95 MMOL/L (ref 99–110)
CO2: 25 MMOL/L (ref 21–32)
CREAT SERPL-MCNC: 1.5 MG/DL (ref 0.8–1.3)
GFR AFRICAN AMERICAN: 56
GFR NON-AFRICAN AMERICAN: 46
GLUCOSE BLD-MCNC: 211 MG/DL (ref 70–99)
POTASSIUM SERPL-SCNC: 4.7 MMOL/L (ref 3.5–5.1)
PRO-BNP: 5733 PG/ML (ref 0–124)
SODIUM BLD-SCNC: 137 MMOL/L (ref 136–145)

## 2018-03-12 DIAGNOSIS — E78.2 MIXED HYPERLIPIDEMIA: ICD-10-CM

## 2018-03-12 DIAGNOSIS — I10 ESSENTIAL HYPERTENSION: ICD-10-CM

## 2018-03-12 DIAGNOSIS — Z79.4 TYPE 2 DIABETES MELLITUS WITH HYPEROSMOLARITY WITHOUT COMA, WITH LONG-TERM CURRENT USE OF INSULIN (HCC): ICD-10-CM

## 2018-03-12 DIAGNOSIS — E11.00 TYPE 2 DIABETES MELLITUS WITH HYPEROSMOLARITY WITHOUT COMA, WITH LONG-TERM CURRENT USE OF INSULIN (HCC): ICD-10-CM

## 2018-03-12 LAB
A/G RATIO: 1.4 (ref 1.1–2.2)
ALBUMIN SERPL-MCNC: 3.9 G/DL (ref 3.4–5)
ALP BLD-CCNC: 75 U/L (ref 40–129)
ALT SERPL-CCNC: 20 U/L (ref 10–40)
ANION GAP SERPL CALCULATED.3IONS-SCNC: 16 MMOL/L (ref 3–16)
AST SERPL-CCNC: 20 U/L (ref 15–37)
BILIRUB SERPL-MCNC: 0.7 MG/DL (ref 0–1)
BUN BLDV-MCNC: 42 MG/DL (ref 7–20)
CALCIUM SERPL-MCNC: 8.6 MG/DL (ref 8.3–10.6)
CHLORIDE BLD-SCNC: 97 MMOL/L (ref 99–110)
CO2: 26 MMOL/L (ref 21–32)
CREAT SERPL-MCNC: 1.4 MG/DL (ref 0.8–1.3)
GFR AFRICAN AMERICAN: >60
GFR NON-AFRICAN AMERICAN: 50
GLOBULIN: 2.8 G/DL
GLUCOSE BLD-MCNC: 296 MG/DL (ref 70–99)
POTASSIUM SERPL-SCNC: 4.5 MMOL/L (ref 3.5–5.1)
SODIUM BLD-SCNC: 139 MMOL/L (ref 136–145)
TOTAL PROTEIN: 6.7 G/DL (ref 6.4–8.2)

## 2018-03-13 LAB
ESTIMATED AVERAGE GLUCOSE: 174.3 MG/DL
HBA1C MFR BLD: 7.7 %

## 2018-03-15 ENCOUNTER — OFFICE VISIT (OUTPATIENT)
Dept: FAMILY MEDICINE CLINIC | Age: 73
End: 2018-03-15

## 2018-03-15 VITALS
HEIGHT: 68 IN | TEMPERATURE: 97.8 F | WEIGHT: 231 LBS | SYSTOLIC BLOOD PRESSURE: 110 MMHG | BODY MASS INDEX: 35.01 KG/M2 | DIASTOLIC BLOOD PRESSURE: 80 MMHG

## 2018-03-15 DIAGNOSIS — Z12.5 SCREENING FOR PROSTATE CANCER: ICD-10-CM

## 2018-03-15 PROCEDURE — 1123F ACP DISCUSS/DSCN MKR DOCD: CPT | Performed by: FAMILY MEDICINE

## 2018-03-15 PROCEDURE — G8427 DOCREV CUR MEDS BY ELIG CLIN: HCPCS | Performed by: FAMILY MEDICINE

## 2018-03-15 PROCEDURE — G8482 FLU IMMUNIZE ORDER/ADMIN: HCPCS | Performed by: FAMILY MEDICINE

## 2018-03-15 PROCEDURE — G8598 ASA/ANTIPLAT THER USED: HCPCS | Performed by: FAMILY MEDICINE

## 2018-03-15 PROCEDURE — 1036F TOBACCO NON-USER: CPT | Performed by: FAMILY MEDICINE

## 2018-03-15 PROCEDURE — G8417 CALC BMI ABV UP PARAM F/U: HCPCS | Performed by: FAMILY MEDICINE

## 2018-03-15 PROCEDURE — 3017F COLORECTAL CA SCREEN DOC REV: CPT | Performed by: FAMILY MEDICINE

## 2018-03-15 PROCEDURE — 4040F PNEUMOC VAC/ADMIN/RCVD: CPT | Performed by: FAMILY MEDICINE

## 2018-03-15 PROCEDURE — 99213 OFFICE O/P EST LOW 20 MIN: CPT | Performed by: FAMILY MEDICINE

## 2018-03-15 RX ORDER — ALPRAZOLAM 0.5 MG/1
0.5 TABLET ORAL NIGHTLY
Qty: 30 TABLET | Refills: 0 | Status: SHIPPED | OUTPATIENT
Start: 2018-03-25 | End: 2018-03-15 | Stop reason: SDUPTHER

## 2018-03-15 RX ORDER — ALPRAZOLAM 0.5 MG/1
0.5 TABLET ORAL NIGHTLY
Qty: 30 TABLET | Refills: 0 | Status: SHIPPED | OUTPATIENT
Start: 2018-04-24 | End: 2018-03-15 | Stop reason: SDUPTHER

## 2018-03-15 RX ORDER — ALPRAZOLAM 0.5 MG/1
0.5 TABLET ORAL NIGHTLY PRN
Qty: 30 TABLET | Refills: 0 | Status: SHIPPED | OUTPATIENT
Start: 2018-03-25 | End: 2018-03-15 | Stop reason: SDUPTHER

## 2018-03-15 RX ORDER — ALPRAZOLAM 0.5 MG/1
0.5 TABLET ORAL NIGHTLY
Qty: 30 TABLET | Refills: 0 | Status: SHIPPED | OUTPATIENT
Start: 2018-05-24 | End: 2018-03-15 | Stop reason: SDUPTHER

## 2018-03-15 RX ORDER — ALPRAZOLAM 0.5 MG/1
0.5 TABLET ORAL NIGHTLY
Qty: 30 TABLET | Refills: 0 | Status: SHIPPED | OUTPATIENT
Start: 2018-03-24 | End: 2018-07-09 | Stop reason: SDUPTHER

## 2018-03-15 ASSESSMENT — PATIENT HEALTH QUESTIONNAIRE - PHQ9
2. FEELING DOWN, DEPRESSED OR HOPELESS: 0
SUM OF ALL RESPONSES TO PHQ9 QUESTIONS 1 & 2: 0
SUM OF ALL RESPONSES TO PHQ QUESTIONS 1-9: 0
1. LITTLE INTEREST OR PLEASURE IN DOING THINGS: 0

## 2018-03-20 RX ORDER — METOPROLOL SUCCINATE 100 MG/1
TABLET, EXTENDED RELEASE ORAL
Qty: 90 TABLET | Refills: 1 | Status: SHIPPED | OUTPATIENT
Start: 2018-03-20 | End: 2018-09-18 | Stop reason: SDUPTHER

## 2018-03-27 ASSESSMENT — ENCOUNTER SYMPTOMS: SHORTNESS OF BREATH: 0

## 2018-03-28 NOTE — PROGRESS NOTES
 POC Glucose 01/19/2018 310*    Performed on 01/19/2018 ACCU-CHEK     POC Glucose 01/19/2018 368*    Performed on 01/19/2018 ACCU-CHEK     POC Glucose 01/19/2018 393*    Performed on 01/19/2018 ACCU-CHEK     Sodium 01/20/2018 137     Potassium 01/20/2018 3.6     Chloride 01/20/2018 96*    CO2 01/20/2018 26     Anion Gap 01/20/2018 15     Glucose 01/20/2018 274*    BUN 01/20/2018 49*    CREATININE 01/20/2018 1.8*    GFR Non- 01/20/2018 37*    GFR  01/20/2018 45*    Calcium 01/20/2018 8.5     Magnesium 01/20/2018 2.00     POC Glucose 01/19/2018 421*    Performed on 01/19/2018 ACCU-CHEK     POC Glucose 01/19/2018 351*    Performed on 01/19/2018 ACCU-CHEK     POC Glucose 01/20/2018 232*    Performed on 01/20/2018 ACCU-CHEK     POC Glucose 01/20/2018 216*    Performed on 01/20/2018 ACCU-CHEK     POC Glucose 01/20/2018 264*    Performed on 01/20/2018 ACCU-CHEK     Sodium 01/21/2018 141     Potassium 01/21/2018 3.8     Chloride 01/21/2018 99     CO2 01/21/2018 29     Anion Gap 01/21/2018 13     Glucose 01/21/2018 178*    BUN 01/21/2018 45*    CREATININE 01/21/2018 1.6*    GFR Non- 01/21/2018 43*    GFR  01/21/2018 52*    Calcium 01/21/2018 8.8     Magnesium 01/21/2018 2.00     Pro-BNP 01/21/2018 7299*    POC Glucose 01/20/2018 287*    Performed on 01/20/2018 ACCU-CHEK     POC Glucose 01/21/2018 124*    Performed on 01/21/2018 ACCU-CHEK            ASSESSMENT  1. Diabetes mellitus due to underlying condition, uncontrolled, with other diabetic kidney complication, with long-term current use of insulin (HCC)  Hemoglobin A1C    Microalbumin / Creatinine Urine Ratio    Lipid Panel    Basic Metabolic Panel   2. Screening for prostate cancer  Psa screening       PLAN  Pt is stable on current meds. Continue with current meds.   New meds this visit:    Orders Placed This Encounter   Medications    DISCONTD: ALPRAZolam Gwendlyn Iron) 0.5 MG tablet     Sig: Take 1 tablet by mouth nightly as needed for Sleep for up to 30 days 1 po at hs prn anxiety. No etoh. No driving. Do NOT take with ambien. .     Dispense:  30 tablet     Refill:  0    DISCONTD: ALPRAZolam (XANAX) 0.5 MG tablet     Sig: Take 1 tablet by mouth nightly for 30 days No etoh. No driving. Do NOT take with ambien. .     Dispense:  30 tablet     Refill:  0    DISCONTD: ALPRAZolam (XANAX) 0.5 MG tablet     Sig: Take 1 tablet by mouth nightly for 30 days No etoh. No driving. Do NOT take with ambien. Do not refill before 4/24/18. ProTip Dispense:  30 tablet     Refill:  0    DISCONTD: ALPRAZolam (XANAX) 0.5 MG tablet     Sig: Take 1 tablet by mouth nightly for 30 days No etoh. No driving. Do NOT take with ambien. Do not refill before 5/24/18. ProTip Dispense:  30 tablet     Refill:  0    DISCONTD: ALPRAZolam (XANAX) 0.5 MG tablet     Sig: Take 1 tablet by mouth nightly for 30 days No etoh. No driving. Do NOT take with ambien. Do not refill before 5/24/18. ProTip Dispense:  30 tablet     Refill:  0    DISCONTD: ALPRAZolam (XANAX) 0.5 MG tablet     Sig: Take 1 tablet by mouth nightly for 30 days No etoh. No driving. Do NOT take with ambien. Do not refill before 4/24/18. ProTip Dispense:  30 tablet     Refill:  0    ALPRAZolam (XANAX) 0.5 MG tablet     Sig: Take 1 tablet by mouth nightly for 30 days No etoh. No driving. Do NOT take with ambien. Do not refill before 3/25/18. ProTip      Dispense:  30 tablet     Refill:  0     New orders placed this visit:    Orders Placed This Encounter   Procedures    Hemoglobin A1C     Standing Status:   Future     Standing Expiration Date:   10/15/2018    Microalbumin / Creatinine Urine Ratio     Standing Status:   Future     Standing Expiration Date:   10/15/2018    Psa screening     Standing Status:   Future     Standing Expiration Date:   10/15/2018    Lipid Panel     Standing Status:   Future     Standing Expiration Date:   10/15/2018     Order Specific Question:   Is Patient Fasting?/# of Hours     Answer:   12 hours    Basic Metabolic Panel     Standing Status:   Future     Standing Expiration Date:   10/15/2018     Return in about 3 months (around 6/11/2018) for refill meds. continue with the following meds:    Outpatient Encounter Prescriptions as of 3/15/2018   Medication Sig Dispense Refill    ALPRAZolam (XANAX) 0.5 MG tablet Take 1 tablet by mouth nightly for 30 days No etoh. No driving. Do NOT take with ambien. Do not refill before 3/25/18. . 30 tablet 0    apixaban (ELIQUIS) 5 MG TABS tablet Take 1 tablet by mouth 2 times daily 56 tablet 0    ACCU-CHEK CARY PLUS strip TEST BLOOD SUGAR FOUR TIMES DAILY 400 strip 3    spironolactone (ALDACTONE) 25 MG tablet Take 1 tablet by mouth daily 30 tablet 3    [DISCONTINUED] predniSONE (DELTASONE) 10 MG tablet Take 3 tablets once a day for 3 days, then take 2 tablets once a day for 3 days, then take 1 tablet once a day for 3 days, then stop. 18 tablet 0    insulin glargine (LANTUS) 100 UNIT/ML injection vial Inject 80 Units into the skin nightly      insulin lispro (HUMALOG) 100 UNIT/ML injection vial Inject 20 Units into the skin 3 times daily (before meals)      atorvastatin (LIPITOR) 80 MG tablet Take 80 mg by mouth every evening      torsemide (DEMADEX) 20 MG tablet Take 20 mg by mouth 2 times daily      [DISCONTINUED] metoprolol succinate (TOPROL XL) 100 MG extended release tablet TAKE 1 TABLET EVERY DAY 30 tablet 0    Insulin Syringe-Needle U-100 (INSULIN SYRINGE .5CC/30GX1/2\") 30G X 1/2\" 0.5 ML MISC Use 5 times a day with insulin. GENERIC IS OK.  200 each 12    Insulin Pen Needle (B-D ULTRAFINE III SHORT PEN) 31G X 8 MM MISC 1 each by Does not apply route daily 100 each 3    albuterol sulfate HFA (PROAIR HFA) 108 (90 BASE) MCG/ACT inhaler Inhale 2 puffs into the lungs every 4 hours as needed for Wheezing or Shortness of Breath 1 Inhaler 0    Lancets MISC accu-check cary  DX: E11.9 100 each 3    Blood Glucose Monitoring Suppl (ACCU-CHEK CARY PLUS) W/DEVICE KIT 1 Device by Does not apply route 2 times daily Dx: E11.9 1 kit 0    Blood Glucose Calibration (ACCU-CHEK INSTANT CONTROL) LIQD 1 Bottle by In Vitro route 2 times daily 1 each 0    Alcohol Swabs (B-D SINGLE USE SWABS REGULAR) PADS 1 each by Does not apply route 2 times daily 100 each 0    [DISCONTINUED] ALPRAZolam (XANAX) 0.5 MG tablet Take 1 tablet by mouth nightly as needed for Sleep for up to 30 days 1 po at hs prn anxiety. No etoh. No driving. Do NOT take with ambien. . 30 tablet 0    [DISCONTINUED] ALPRAZolam (XANAX) 0.5 MG tablet Take 1 tablet by mouth nightly for 30 days No etoh. No driving. Do NOT take with ambien. . 30 tablet 0    [DISCONTINUED] ALPRAZolam (XANAX) 0.5 MG tablet Take 1 tablet by mouth nightly for 30 days No etoh. No driving. Do NOT take with ambien. Do not refill before 4/24/18. . 30 tablet 0    [DISCONTINUED] ALPRAZolam (XANAX) 0.5 MG tablet Take 1 tablet by mouth nightly for 30 days No etoh. No driving. Do NOT take with ambien. Do not refill before 5/24/18. . 30 tablet 0    [DISCONTINUED] ALPRAZolam (XANAX) 0.5 MG tablet Take 1 tablet by mouth nightly for 30 days No etoh. No driving. Do NOT take with ambien. Do not refill before 5/24/18. . 30 tablet 0    [DISCONTINUED] ALPRAZolam (XANAX) 0.5 MG tablet Take 1 tablet by mouth nightly for 30 days No etoh. No driving. Do NOT take with ambien. Do not refill before 4/24/18. . 30 tablet 0    [DISCONTINUED] ALPRAZolam (XANAX) 0.5 MG tablet 1 po at hs prn anxiety. No etoh. No driving. Do NOT take with ambien. . 30 tablet 0     No facility-administered encounter medications on file as of 3/15/2018.           Tom Muñiz D.O.

## 2018-04-19 RX ORDER — LANCETS 30 GAUGE
EACH MISCELLANEOUS
Qty: 100 EACH | Refills: 3 | Status: SHIPPED | OUTPATIENT
Start: 2018-04-19 | End: 2020-01-01 | Stop reason: ALTCHOICE

## 2018-04-23 RX ORDER — BLOOD-GLUCOSE METER
1 EACH MISCELLANEOUS 2 TIMES DAILY
Qty: 1 KIT | Refills: 0 | Status: SHIPPED | OUTPATIENT
Start: 2018-04-23 | End: 2020-01-01 | Stop reason: ALTCHOICE

## 2018-04-23 RX ORDER — SPIRONOLACTONE 25 MG/1
25 TABLET ORAL DAILY
Qty: 90 TABLET | Refills: 0 | Status: SHIPPED | OUTPATIENT
Start: 2018-04-23 | End: 2018-06-26 | Stop reason: SDUPTHER

## 2018-04-24 ENCOUNTER — TELEPHONE (OUTPATIENT)
Dept: FAMILY MEDICINE CLINIC | Age: 73
End: 2018-04-24

## 2018-04-30 RX ORDER — ALPRAZOLAM 0.5 MG/1
0.5 TABLET ORAL NIGHTLY
Qty: 30 TABLET | Refills: 0 | OUTPATIENT
Start: 2018-04-30 | End: 2018-05-30

## 2018-05-14 ENCOUNTER — TELEPHONE (OUTPATIENT)
Dept: FAMILY MEDICINE CLINIC | Age: 73
End: 2018-05-14

## 2018-06-12 ENCOUNTER — NURSE ONLY (OUTPATIENT)
Dept: CARDIOLOGY CLINIC | Age: 73
End: 2018-06-12

## 2018-06-12 DIAGNOSIS — I50.23 ACUTE ON CHRONIC SYSTOLIC CONGESTIVE HEART FAILURE (HCC): ICD-10-CM

## 2018-06-12 DIAGNOSIS — Z95.810 AUTOMATIC IMPLANTABLE CARDIOVERTER-DEFIBRILLATOR IN SITU: ICD-10-CM

## 2018-06-12 DIAGNOSIS — I25.5 ISCHEMIC CARDIOMYOPATHY: ICD-10-CM

## 2018-06-12 PROCEDURE — 93295 DEV INTERROG REMOTE 1/2/MLT: CPT | Performed by: INTERNAL MEDICINE

## 2018-06-12 PROCEDURE — 93297 REM INTERROG DEV EVAL ICPMS: CPT | Performed by: INTERNAL MEDICINE

## 2018-06-12 PROCEDURE — 93296 REM INTERROG EVL PM/IDS: CPT | Performed by: INTERNAL MEDICINE

## 2018-06-14 DIAGNOSIS — Z12.5 SCREENING FOR PROSTATE CANCER: ICD-10-CM

## 2018-06-14 LAB
ANION GAP SERPL CALCULATED.3IONS-SCNC: 17 MMOL/L (ref 3–16)
BUN BLDV-MCNC: 40 MG/DL (ref 7–20)
CALCIUM SERPL-MCNC: 9.2 MG/DL (ref 8.3–10.6)
CHLORIDE BLD-SCNC: 99 MMOL/L (ref 99–110)
CHOLESTEROL, TOTAL: 112 MG/DL (ref 0–199)
CO2: 27 MMOL/L (ref 21–32)
CREAT SERPL-MCNC: 1.4 MG/DL (ref 0.8–1.3)
CREATININE URINE: 120.7 MG/DL (ref 39–259)
ESTIMATED AVERAGE GLUCOSE: 171.4 MG/DL
GFR AFRICAN AMERICAN: >60
GFR NON-AFRICAN AMERICAN: 50
GLUCOSE BLD-MCNC: 192 MG/DL (ref 70–99)
HBA1C MFR BLD: 7.6 %
HDLC SERPL-MCNC: 36 MG/DL (ref 40–60)
LDL CHOLESTEROL CALCULATED: 58 MG/DL
MICROALBUMIN UR-MCNC: 8.8 MG/DL
MICROALBUMIN/CREAT UR-RTO: 72.9 MG/G (ref 0–30)
POTASSIUM SERPL-SCNC: 4.6 MMOL/L (ref 3.5–5.1)
PROSTATE SPECIFIC ANTIGEN: 0.3 NG/ML (ref 0–4)
SODIUM BLD-SCNC: 143 MMOL/L (ref 136–145)
TRIGL SERPL-MCNC: 89 MG/DL (ref 0–150)
VLDLC SERPL CALC-MCNC: 18 MG/DL

## 2018-06-18 ENCOUNTER — OFFICE VISIT (OUTPATIENT)
Dept: FAMILY MEDICINE CLINIC | Age: 73
End: 2018-06-18

## 2018-06-18 VITALS
TEMPERATURE: 98.5 F | WEIGHT: 232.4 LBS | SYSTOLIC BLOOD PRESSURE: 128 MMHG | DIASTOLIC BLOOD PRESSURE: 78 MMHG | HEIGHT: 68 IN | BODY MASS INDEX: 35.22 KG/M2

## 2018-06-18 DIAGNOSIS — F41.9 ACUTE ANXIETY: Primary | ICD-10-CM

## 2018-06-18 DIAGNOSIS — F51.01 PRIMARY INSOMNIA: ICD-10-CM

## 2018-06-18 PROCEDURE — G8417 CALC BMI ABV UP PARAM F/U: HCPCS | Performed by: FAMILY MEDICINE

## 2018-06-18 PROCEDURE — G8427 DOCREV CUR MEDS BY ELIG CLIN: HCPCS | Performed by: FAMILY MEDICINE

## 2018-06-18 PROCEDURE — 1123F ACP DISCUSS/DSCN MKR DOCD: CPT | Performed by: FAMILY MEDICINE

## 2018-06-18 PROCEDURE — 99213 OFFICE O/P EST LOW 20 MIN: CPT | Performed by: FAMILY MEDICINE

## 2018-06-18 PROCEDURE — 4040F PNEUMOC VAC/ADMIN/RCVD: CPT | Performed by: FAMILY MEDICINE

## 2018-06-18 PROCEDURE — G8598 ASA/ANTIPLAT THER USED: HCPCS | Performed by: FAMILY MEDICINE

## 2018-06-18 PROCEDURE — 1036F TOBACCO NON-USER: CPT | Performed by: FAMILY MEDICINE

## 2018-06-18 PROCEDURE — 3017F COLORECTAL CA SCREEN DOC REV: CPT | Performed by: FAMILY MEDICINE

## 2018-06-18 RX ORDER — TRAZODONE HYDROCHLORIDE 50 MG/1
50 TABLET ORAL NIGHTLY
Qty: 30 TABLET | Refills: 0 | Status: SHIPPED | OUTPATIENT
Start: 2018-06-18 | End: 2018-12-06 | Stop reason: ALTCHOICE

## 2018-06-19 ENCOUNTER — TELEPHONE (OUTPATIENT)
Dept: FAMILY MEDICINE CLINIC | Age: 73
End: 2018-06-19

## 2018-06-19 DIAGNOSIS — E11.00 UNCONTROLLED TYPE 2 DIABETES MELLITUS WITH HYPEROSMOLARITY WITHOUT COMA, WITH LONG-TERM CURRENT USE OF INSULIN (HCC): ICD-10-CM

## 2018-06-19 DIAGNOSIS — Z79.4 UNCONTROLLED TYPE 2 DIABETES MELLITUS WITH HYPEROSMOLARITY WITHOUT COMA, WITH LONG-TERM CURRENT USE OF INSULIN (HCC): ICD-10-CM

## 2018-06-19 DIAGNOSIS — F41.9 ACUTE ANXIETY: Primary | ICD-10-CM

## 2018-06-21 RX ORDER — TRAZODONE HYDROCHLORIDE 50 MG/1
TABLET ORAL
Qty: 90 TABLET | Refills: 0 | OUTPATIENT
Start: 2018-06-21

## 2018-06-21 NOTE — TELEPHONE ENCOUNTER
For blood sugar or blood pressure? I do not know of a blood sugar machine -- please see if he knows of something that I dont know for a sugar machine.

## 2018-06-27 RX ORDER — ATORVASTATIN CALCIUM 80 MG/1
TABLET, FILM COATED ORAL
Qty: 90 TABLET | Refills: 1 | Status: SHIPPED | OUTPATIENT
Start: 2018-06-27 | End: 2018-11-20 | Stop reason: SDUPTHER

## 2018-06-27 RX ORDER — SPIRONOLACTONE 25 MG/1
TABLET ORAL
Qty: 90 TABLET | Refills: 0 | Status: SHIPPED | OUTPATIENT
Start: 2018-06-27 | End: 2018-08-07 | Stop reason: SDUPTHER

## 2018-07-05 NOTE — TELEPHONE ENCOUNTER
Kinsey calling back regarding machine for blood sugar readings. I called [facility] and its called Free [employer]uan and please put on script for device & sensors. Please send to [facility]. Please advise, [phone]      P.S.  Jerad Alford wants to go back on Xanax, he is not liking Trazodone due to not sleeping and it gives him a headache.

## 2018-07-06 RX ORDER — FLASH GLUCOSE SENSOR
KIT MISCELLANEOUS
Qty: 300 EACH | Refills: 3 | Status: CANCELLED | OUTPATIENT
Start: 2018-07-06

## 2018-07-06 NOTE — TELEPHONE ENCOUNTER
Pt calling back to if you answered his message regarding Free Style [employer]uan machine & going back to Xanax.       Please advise,

## 2018-07-09 PROBLEM — F41.9 ACUTE ANXIETY: Status: ACTIVE | Noted: 2018-07-09

## 2018-07-09 RX ORDER — ALPRAZOLAM 0.5 MG/1
0.5 TABLET ORAL NIGHTLY PRN
Qty: 30 TABLET | Refills: 0 | Status: SHIPPED | OUTPATIENT
Start: 2018-09-07 | End: 2018-07-09 | Stop reason: SDUPTHER

## 2018-07-09 RX ORDER — ALPRAZOLAM 0.5 MG/1
0.5 TABLET ORAL NIGHTLY
Qty: 30 TABLET | Refills: 0 | Status: SHIPPED | OUTPATIENT
Start: 2018-07-09 | End: 2018-07-09 | Stop reason: SDUPTHER

## 2018-07-09 RX ORDER — ALPRAZOLAM 0.5 MG/1
0.5 TABLET ORAL NIGHTLY PRN
Qty: 30 TABLET | Refills: 0 | Status: SHIPPED | OUTPATIENT
Start: 2018-08-08 | End: 2018-07-09 | Stop reason: SDUPTHER

## 2018-07-09 RX ORDER — ALPRAZOLAM 0.5 MG/1
0.5 TABLET ORAL NIGHTLY PRN
Qty: 30 TABLET | Refills: 0 | Status: SHIPPED | OUTPATIENT
Start: 2018-07-09 | End: 2018-09-06 | Stop reason: SDUPTHER

## 2018-07-09 NOTE — TELEPHONE ENCOUNTER
Call pt his scipts are ready to . I also wrote a script for his new meter and test strips. Tell him to return for med refills BEFORE  September 1st.  Tell him to bring his pill bottle too.

## 2018-07-10 PROBLEM — F51.01 PRIMARY INSOMNIA: Status: ACTIVE | Noted: 2018-07-10

## 2018-07-10 ASSESSMENT — ENCOUNTER SYMPTOMS: SHORTNESS OF BREATH: 0

## 2018-07-26 RX ORDER — INSULIN GLARGINE 100 [IU]/ML
INJECTION, SOLUTION SUBCUTANEOUS
Qty: 75 ML | Refills: 3 | Status: SHIPPED | OUTPATIENT
Start: 2018-07-26 | End: 2019-07-16 | Stop reason: SDUPTHER

## 2018-08-06 ENCOUNTER — TELEPHONE (OUTPATIENT)
Dept: FAMILY MEDICINE CLINIC | Age: 73
End: 2018-08-06

## 2018-08-07 RX ORDER — SPIRONOLACTONE 25 MG/1
TABLET ORAL
Qty: 90 TABLET | Refills: 1 | Status: SHIPPED | OUTPATIENT
Start: 2018-08-07 | End: 2019-04-16 | Stop reason: SDUPTHER

## 2018-08-07 NOTE — TELEPHONE ENCOUNTER
Call pt    On 7/9/18 I wrote 3 scripts -- one to be filled 7/9/18, one to be filled on 8/8/18, and the last to be filled on 9/7/18. Please have him check if he has misplaced the last script. Thanks.

## 2018-08-07 NOTE — TELEPHONE ENCOUNTER
Spoke with pt he will get prescription tomorrow. I told him to count them to make sure pharmacy didn't count wrong.

## 2018-08-21 ENCOUNTER — TELEPHONE (OUTPATIENT)
Dept: FAMILY MEDICINE CLINIC | Age: 73
End: 2018-08-21

## 2018-08-21 ENCOUNTER — OFFICE VISIT (OUTPATIENT)
Dept: CARDIOLOGY CLINIC | Age: 73
End: 2018-08-21

## 2018-08-21 VITALS
HEIGHT: 68 IN | HEART RATE: 90 BPM | OXYGEN SATURATION: 98 % | BODY MASS INDEX: 34.86 KG/M2 | DIASTOLIC BLOOD PRESSURE: 70 MMHG | WEIGHT: 230 LBS | SYSTOLIC BLOOD PRESSURE: 110 MMHG

## 2018-08-21 DIAGNOSIS — I10 ESSENTIAL HYPERTENSION: ICD-10-CM

## 2018-08-21 DIAGNOSIS — I50.42 CHRONIC COMBINED SYSTOLIC AND DIASTOLIC CHF, NYHA CLASS 3 (HCC): Primary | ICD-10-CM

## 2018-08-21 DIAGNOSIS — I25.10 CORONARY ARTERY DISEASE INVOLVING NATIVE CORONARY ARTERY OF NATIVE HEART WITHOUT ANGINA PECTORIS: ICD-10-CM

## 2018-08-21 DIAGNOSIS — I48.0 PAROXYSMAL ATRIAL FIBRILLATION (HCC): ICD-10-CM

## 2018-08-21 DIAGNOSIS — E78.5 HYPERLIPIDEMIA LDL GOAL <70: ICD-10-CM

## 2018-08-21 PROCEDURE — G8417 CALC BMI ABV UP PARAM F/U: HCPCS | Performed by: INTERNAL MEDICINE

## 2018-08-21 PROCEDURE — 4040F PNEUMOC VAC/ADMIN/RCVD: CPT | Performed by: INTERNAL MEDICINE

## 2018-08-21 PROCEDURE — 93000 ELECTROCARDIOGRAM COMPLETE: CPT | Performed by: INTERNAL MEDICINE

## 2018-08-21 PROCEDURE — 1036F TOBACCO NON-USER: CPT | Performed by: INTERNAL MEDICINE

## 2018-08-21 PROCEDURE — 99214 OFFICE O/P EST MOD 30 MIN: CPT | Performed by: INTERNAL MEDICINE

## 2018-08-21 PROCEDURE — 1101F PT FALLS ASSESS-DOCD LE1/YR: CPT | Performed by: INTERNAL MEDICINE

## 2018-08-21 PROCEDURE — 3017F COLORECTAL CA SCREEN DOC REV: CPT | Performed by: INTERNAL MEDICINE

## 2018-08-21 PROCEDURE — G8598 ASA/ANTIPLAT THER USED: HCPCS | Performed by: INTERNAL MEDICINE

## 2018-08-21 PROCEDURE — 1123F ACP DISCUSS/DSCN MKR DOCD: CPT | Performed by: INTERNAL MEDICINE

## 2018-08-21 PROCEDURE — G8427 DOCREV CUR MEDS BY ELIG CLIN: HCPCS | Performed by: INTERNAL MEDICINE

## 2018-08-21 NOTE — PROGRESS NOTES
[facility]   Electrophysiology   Date: 8/21/2018    Chief Complaint:   CHF     Bettye Mae is a 72 y.o. with a PMH significant for severe CAD in the RCA and LAD, HLD, HTN, ischemic cardiomyopathy with an LVEF of 30-35% and CHF. He underwent ICD placement 9/2015. He follows primarily in our office with Dr. Tyesha Rincon and Renu Jones CNP. He presents today for follow up. Since we last saw him he reports feeling well overall. His breathing has been well. The previous lower extremity edema he was experiencing is much improved. He has been monitoring the sodium in his diet and has made some dietary improvements. He denies any chest pain or angina otherwise. He completes daily weights and reports stable weights. He denies any regular exercise due to chronic back pain. He reports his blood suagrs are much better controlled now. His BS was 100 yesterday morning he says. Review of Systems:  He denies lightheadedness, dizziness, chest pain, orthopnea, presyncope or syncope. He has been compliant with his medications and tolerating well. Allergies: Allergies   Allergen Reactions    Cipro Xr     Claritin [Loratadine]     Levofloxacin     Peppermint Flavor [Flavoring Agent] Swelling     Current Outpatient Prescriptions   Medication Sig Dispense Refill    spironolactone (ALDACTONE) 25 MG tablet TAKE 1 TABLET EVERY DAY 90 tablet 1    LANTUS SOLOSTAR 100 UNIT/ML injection pen INJECT 80 UNITS SUBCUTANEOUSLY EVERY NIGHT 75 mL 3    ALPRAZolam (XANAX) 0.5 MG tablet Take 1 tablet by mouth nightly as needed for Sleep or Anxiety for up to 30 days.  Do not refill before 7/9/18. 30 tablet 0    atorvastatin (LIPITOR) 80 MG tablet TAKE 1 TABLET EVERY DAY 90 tablet 1    metFORMIN (GLUCOPHAGE) 500 MG tablet TAKE 1 TABLET TWICE DAILY WITH MEALS 180 tablet 0    traZODone (DESYREL) 50 MG tablet Take 1 tablet by mouth nightly 30 tablet 0    Blood Glucose Monitoring Suppl (ACCU-CHEK CARY PLUS) w/Device KIT 1 rhythm.   Right ventricle is moderately dilated with moderate reduced function.   Mildly dilated left atrium.   Right atrial size is mildly dilated.   Mild mitral regurgitation is present.   Mild tricuspid regurgitation. Echo: 2/2015  Global ejection fraction is moderate-to-severely decreased and estimated from 30 % to 35 %. Abnormal (paradoxical) septal motion is present likely due to . Severe anterior and anteroseptal hypokinesis c/w ischemic cardiomyopathy. As reported on 4-  Mild mitral regurgitation is present. Mildly dilated left atrium. Limited echocardiogram 4/16/14:  Global ejection fraction is moderate-to-severely decreased and estimated  from 30 % to 35 %. Abnormal (paradoxical) septal motion is present. Severe Anterior and  anterospeptal wall hypokinesis  Mild mitral regurgitation is present. Normal right ventricular size and function. There is trace tricuspid regurgitation with RVSP estimated at 40 mmHg  assuming a right atrial pressure of 5mmHg. This is suggestive of mild pulmonary hypertension. Cath: 2/2015  1. Successful percutaneous coronary intervention using a 2.75-mm Xience drug-eluting stent in an 80% mid left anterior descending artery lesion that was moderately calcified. This was dilated to 2.91 mm in diameter. Successful stenting with a 2.75 mm x 18 mm Xience drug-eluting stent in the ostial 80% left anterior descending artery lesion. Kissing balloon technique was utilized in the ostial circumflex artery and left anterior descending artery stents, resulting in 0% residual stenosis and DEN 3 flow. 2. In the dominant right coronary artery, successfully intervened upon with a 3 mm x 38 mm Xience drug-eluting stent dilated to 3.20 mm. There was DEN 3 flow in the vessel and 0% residual stenosis. 3. Otherwise, in the left system, there is an 80% hazy first diagonal branch lesion.  There was 30% restenosis in the proximal circumflex artery stent and diffuse disease

## 2018-08-21 NOTE — PATIENT INSTRUCTIONS
a pneumococcal vaccine shot. If you have had one before, ask your doctor whether you need another dose. Get a flu shot every year. If you must be around people with colds or flu, wash your hands often. Activity    · If your doctor recommends it, get more exercise. Walking is a good choice. Bit by bit, increase the amount you walk every day. Try for at least 30 minutes on most days of the week. You also may want to swim, bike, or do other activities. Your doctor may suggest that you join a cardiac rehabilitation program so that you can have help increasing your physical activity safely.     · Start light exercise if your doctor says it is okay. Even a small amount will help you get stronger, have more energy, and manage stress. Walking is an easy way to get exercise. Start out by walking a little more than you did in the hospital. Gradually increase the amount you walk.     · When you exercise, watch for signs that your heart is working too hard. You are pushing too hard if you cannot talk while you are exercising. If you become short of breath or dizzy or have chest pain, sit down and rest immediately.     · Check your pulse regularly. Place two fingers on the artery at the palm side of your wrist, in line with your thumb. If your heartbeat seems uneven or fast, talk to your doctor. When should you call for help? Call 911 anytime you think you may need emergency care. For example, call if:    · You have symptoms of a heart attack. These may include:  ¨ Chest pain or pressure, or a strange feeling in the chest.  ¨ Sweating. ¨ Shortness of breath. ¨ Nausea or vomiting. ¨ Pain, pressure, or a strange feeling in the back, neck, jaw, or upper belly or in one or both shoulders or arms. ¨ Lightheadedness or sudden weakness. ¨ A fast or irregular heartbeat. After you call 911, the  may tell you to chew 1 adult-strength or 2 to 4 low-dose aspirin. Wait for an ambulance.  Do not try to drive yourself.     heart disease. Follow-up care is a key part of your treatment and safety. Be sure to make and go to all appointments, and call your doctor if you are having problems. It's also a good idea to know your test results and keep a list of the medicines you take. How can you care for yourself at home? Watch your portions  · Learn what a serving is. A \"serving\" and a \"portion\" are not always the same thing. Make sure that you are not eating larger portions than are recommended. For example, a serving of pasta is ½ cup. A serving size of meat is 2 to 3 ounces. A 3-ounce serving is about the size of a deck of cards. Measure serving sizes until you are good at [place]" them. Keep in mind that restaurants often serve portions that are 2 or 3 times the size of one serving. · To keep your energy level up and keep you from feeling hungry, eat often but in smaller portions. · Eat only the number of calories you need to stay at a healthy weight. If you need to lose weight, eat fewer calories than your body burns (through exercise and other physical activity). Eat more fruits and vegetables  · Eat a variety of fruit and vegetables every day. Dark green, deep orange, red, or yellow fruits and vegetables are especially good for you. Examples include spinach, carrots, peaches, and berries. · Keep carrots, celery, and other veggies handy for snacks. Buy fruit that is in season and store it where you can see it so that you will be tempted to eat it. · Cook dishes that have a lot of veggies in them, such as stir-fries and soups. Limit saturated and trans fat  · Read food labels, and try to avoid saturated and trans fats. They increase your risk of heart disease. Trans fat is found in many processed foods such as cookies and crackers. · Use olive or canola oil when you cook. Try cholesterol-lowering spreads, such as Benecol or Take Control. · Bake, broil, grill, or steam foods instead of frying them.   · Choose lean meats help quitting, talk to your doctor about stop-smoking programs and medicines. These can increase your chances of quitting for good.     · Eat a heart-healthy diet.     · Stay at a healthy weight. Lose weight if you need to.     · Limit alcohol to 2 drinks a day for men and 1 drink a day for women. Too much alcohol can cause health problems.     · Avoid colds and flu. Get a pneumococcal vaccine shot. If you have had one before, ask your doctor whether you need another dose. Get a flu shot every year. If you must be around people with colds or flu, wash your hands often. Activity    · If your doctor recommends it, get more exercise. Walking is a good choice. Bit by bit, increase the amount you walk every day. Try for at least 30 minutes on most days of the week. You also may want to swim, bike, or do other activities. Your doctor may suggest that you join a cardiac rehabilitation program so that you can have help increasing your physical activity safely.     · Start light exercise if your doctor says it is okay. Even a small amount will help you get stronger, have more energy, and manage stress. Walking is an easy way to get exercise. Start out by walking a little more than you did in the hospital. Gradually increase the amount you walk.     · When you exercise, watch for signs that your heart is working too hard. You are pushing too hard if you cannot talk while you are exercising. If you become short of breath or dizzy or have chest pain, sit down and rest immediately.     · Check your pulse regularly. Place two fingers on the artery at the palm side of your wrist, in line with your thumb. If your heartbeat seems uneven or fast, talk to your doctor. When should you call for help? Call 911 anytime you think you may need emergency care. For example, call if:    · You have symptoms of a heart attack. These may include:  ¨ Chest pain or pressure, or a strange feeling in the chest.  ¨ Sweating.   ¨ Shortness of breath. ¨ Nausea or vomiting. ¨ Pain, pressure, or a strange feeling in the back, neck, jaw, or upper belly or in one or both shoulders or arms. ¨ Lightheadedness or sudden weakness. ¨ A fast or irregular heartbeat. After you call 911, the  may tell you to chew 1 adult-strength or 2 to 4 low-dose aspirin. Wait for an ambulance. Do not try to drive yourself.     · You have symptoms of a stroke. These may include:  ¨ Sudden numbness, tingling, weakness, or loss of movement in your face, arm, or leg, especially on only one side of your body. ¨ Sudden vision changes. ¨ Sudden trouble speaking. ¨ Sudden confusion or trouble understanding simple statements. ¨ Sudden problems with walking or balance. ¨ A sudden, severe headache that is different from past headaches.     · You passed out (lost consciousness).    Call your doctor now or seek immediate medical care if:    · You have new or increased shortness of breath.     · You feel dizzy or lightheaded, or you feel like you may faint.     · Your heart rate becomes irregular.     · You can feel your heart flutter in your chest or skip heartbeats. Tell your doctor if these symptoms are new or worse.    Watch closely for changes in your health, and be sure to contact your doctor if you have any problems. Where can you learn more? Go to https://[email or web address]Ag[employer]. org and sign in to your [employer] account. Enter U020 in the [employer] [place] box to learn more about \"Atrial Fibrillation: Care Instructions. \"     If you do not have an account, please click on the \"Sign Up Now\" link. Current as of: December 6, 2017  Content Version: 11.7  © 5134-2055 [employer]. Care instructions adapted under license by [employer] [facility] ([facility]). If you have questions about a medical condition or this instruction, always ask your healthcare professional. [place] [address] any warranty or liability for your use of this information.

## 2018-09-06 ENCOUNTER — OFFICE VISIT (OUTPATIENT)
Dept: FAMILY MEDICINE CLINIC | Age: 73
End: 2018-09-06

## 2018-09-06 VITALS
SYSTOLIC BLOOD PRESSURE: 122 MMHG | WEIGHT: 234 LBS | TEMPERATURE: 97.8 F | DIASTOLIC BLOOD PRESSURE: 80 MMHG | BODY MASS INDEX: 35.58 KG/M2

## 2018-09-06 DIAGNOSIS — F41.9 ACUTE ANXIETY: ICD-10-CM

## 2018-09-06 PROCEDURE — 4040F PNEUMOC VAC/ADMIN/RCVD: CPT | Performed by: FAMILY MEDICINE

## 2018-09-06 PROCEDURE — 3017F COLORECTAL CA SCREEN DOC REV: CPT | Performed by: FAMILY MEDICINE

## 2018-09-06 PROCEDURE — 1123F ACP DISCUSS/DSCN MKR DOCD: CPT | Performed by: FAMILY MEDICINE

## 2018-09-06 PROCEDURE — 1101F PT FALLS ASSESS-DOCD LE1/YR: CPT | Performed by: FAMILY MEDICINE

## 2018-09-06 PROCEDURE — 99213 OFFICE O/P EST LOW 20 MIN: CPT | Performed by: FAMILY MEDICINE

## 2018-09-06 PROCEDURE — G8598 ASA/ANTIPLAT THER USED: HCPCS | Performed by: FAMILY MEDICINE

## 2018-09-06 PROCEDURE — G8417 CALC BMI ABV UP PARAM F/U: HCPCS | Performed by: FAMILY MEDICINE

## 2018-09-06 PROCEDURE — 1036F TOBACCO NON-USER: CPT | Performed by: FAMILY MEDICINE

## 2018-09-06 PROCEDURE — G8427 DOCREV CUR MEDS BY ELIG CLIN: HCPCS | Performed by: FAMILY MEDICINE

## 2018-09-06 RX ORDER — ALPRAZOLAM 0.5 MG/1
0.5 TABLET ORAL NIGHTLY PRN
Qty: 30 TABLET | Refills: 0 | Status: SHIPPED | OUTPATIENT
Start: 2018-09-10 | End: 2018-09-06 | Stop reason: SDUPTHER

## 2018-09-06 RX ORDER — ALPRAZOLAM 0.5 MG/1
TABLET ORAL
Qty: 30 TABLET | Refills: 0 | Status: SHIPPED | OUTPATIENT
Start: 2018-11-05 | End: 2018-12-06 | Stop reason: ALTCHOICE

## 2018-09-06 RX ORDER — ALPRAZOLAM 0.5 MG/1
TABLET ORAL
Qty: 30 TABLET | Refills: 0 | Status: SHIPPED | OUTPATIENT
Start: 2018-10-06 | End: 2018-09-06 | Stop reason: SDUPTHER

## 2018-09-06 RX ORDER — ALPRAZOLAM 0.5 MG/1
TABLET ORAL
Qty: 30 TABLET | Refills: 0 | Status: SHIPPED | OUTPATIENT
Start: 2018-09-10 | End: 2018-09-06 | Stop reason: SDUPTHER

## 2018-09-06 RX ORDER — ALPRAZOLAM 0.5 MG/1
TABLET ORAL
Qty: 30 TABLET | Refills: 0 | Status: SHIPPED | OUTPATIENT
Start: 2018-11-05 | End: 2018-12-06 | Stop reason: SDUPTHER

## 2018-09-18 ENCOUNTER — NURSE ONLY (OUTPATIENT)
Dept: CARDIOLOGY CLINIC | Age: 73
End: 2018-09-18

## 2018-09-18 DIAGNOSIS — I50.23 ACUTE ON CHRONIC SYSTOLIC CONGESTIVE HEART FAILURE, NYHA CLASS 4 (HCC): ICD-10-CM

## 2018-09-18 DIAGNOSIS — Z95.810 AUTOMATIC IMPLANTABLE CARDIOVERTER-DEFIBRILLATOR IN SITU: ICD-10-CM

## 2018-09-18 DIAGNOSIS — I25.5 ISCHEMIC CARDIOMYOPATHY: ICD-10-CM

## 2018-09-18 PROCEDURE — 93297 REM INTERROG DEV EVAL ICPMS: CPT | Performed by: INTERNAL MEDICINE

## 2018-09-18 PROCEDURE — 93295 DEV INTERROG REMOTE 1/2/MLT: CPT | Performed by: INTERNAL MEDICINE

## 2018-09-18 PROCEDURE — 93296 REM INTERROG EVL PM/IDS: CPT | Performed by: INTERNAL MEDICINE

## 2018-09-19 RX ORDER — METOPROLOL SUCCINATE 100 MG/1
TABLET, EXTENDED RELEASE ORAL
Qty: 90 TABLET | Refills: 1 | Status: SHIPPED | OUTPATIENT
Start: 2018-09-19 | End: 2019-05-18 | Stop reason: SDUPTHER

## 2018-10-04 ENCOUNTER — NURSE ONLY (OUTPATIENT)
Dept: FAMILY MEDICINE CLINIC | Age: 73
End: 2018-10-04
Payer: MEDICARE

## 2018-10-04 DIAGNOSIS — Z23 NEED FOR INFLUENZA VACCINATION: Primary | ICD-10-CM

## 2018-10-04 PROCEDURE — 90662 IIV NO PRSV INCREASED AG IM: CPT | Performed by: FAMILY MEDICINE

## 2018-10-04 PROCEDURE — G0008 ADMIN INFLUENZA VIRUS VAC: HCPCS | Performed by: FAMILY MEDICINE

## 2018-10-04 RX ORDER — APIXABAN 5 MG/1
TABLET, FILM COATED ORAL
Qty: 180 TABLET | Refills: 3 | Status: SHIPPED | OUTPATIENT
Start: 2018-10-04 | End: 2020-01-01 | Stop reason: SDUPTHER

## 2018-10-04 RX ORDER — TORSEMIDE 20 MG/1
20 TABLET ORAL 2 TIMES DAILY
Qty: 180 TABLET | Refills: 2 | Status: SHIPPED | OUTPATIENT
Start: 2018-10-04 | End: 2019-03-25 | Stop reason: SDUPTHER

## 2018-10-16 ENCOUNTER — TELEPHONE (OUTPATIENT)
Dept: FAMILY MEDICINE CLINIC | Age: 73
End: 2018-10-16

## 2018-10-16 RX ORDER — RAMIPRIL 10 MG/1
CAPSULE ORAL
Qty: 90 CAPSULE | Refills: 1 | Status: SHIPPED | OUTPATIENT
Start: 2018-10-16 | End: 2019-07-16 | Stop reason: SDUPTHER

## 2018-10-18 DIAGNOSIS — I10 ESSENTIAL HYPERTENSION: ICD-10-CM

## 2018-10-18 DIAGNOSIS — E11.00 UNCONTROLLED TYPE 2 DIABETES MELLITUS WITH HYPEROSMOLARITY WITHOUT COMA, WITH LONG-TERM CURRENT USE OF INSULIN (HCC): Primary | ICD-10-CM

## 2018-10-18 DIAGNOSIS — Z79.4 UNCONTROLLED TYPE 2 DIABETES MELLITUS WITH HYPEROSMOLARITY WITHOUT COMA, WITH LONG-TERM CURRENT USE OF INSULIN (HCC): Primary | ICD-10-CM

## 2018-10-21 ASSESSMENT — ENCOUNTER SYMPTOMS: SHORTNESS OF BREATH: 0

## 2018-10-22 ENCOUNTER — TELEPHONE (OUTPATIENT)
Dept: FAMILY MEDICINE CLINIC | Age: 73
End: 2018-10-22

## 2018-10-22 DIAGNOSIS — I10 ESSENTIAL HYPERTENSION: ICD-10-CM

## 2018-10-22 DIAGNOSIS — E11.00 UNCONTROLLED TYPE 2 DIABETES MELLITUS WITH HYPEROSMOLARITY WITHOUT COMA, WITH LONG-TERM CURRENT USE OF INSULIN (HCC): ICD-10-CM

## 2018-10-22 DIAGNOSIS — Z79.4 UNCONTROLLED TYPE 2 DIABETES MELLITUS WITH HYPEROSMOLARITY WITHOUT COMA, WITH LONG-TERM CURRENT USE OF INSULIN (HCC): ICD-10-CM

## 2018-10-22 LAB
ANION GAP SERPL CALCULATED.3IONS-SCNC: 18 MMOL/L (ref 3–16)
BUN BLDV-MCNC: 32 MG/DL (ref 7–20)
CALCIUM SERPL-MCNC: 9.3 MG/DL (ref 8.3–10.6)
CHLORIDE BLD-SCNC: 98 MMOL/L (ref 99–110)
CO2: 26 MMOL/L (ref 21–32)
CREAT SERPL-MCNC: 1.5 MG/DL (ref 0.8–1.3)
GFR AFRICAN AMERICAN: 56
GFR NON-AFRICAN AMERICAN: 46
GLUCOSE BLD-MCNC: 155 MG/DL (ref 70–99)
POTASSIUM SERPL-SCNC: 4.1 MMOL/L (ref 3.5–5.1)
SODIUM BLD-SCNC: 142 MMOL/L (ref 136–145)

## 2018-10-23 LAB
ESTIMATED AVERAGE GLUCOSE: 203 MG/DL
HBA1C MFR BLD: 8.7 %

## 2018-10-26 ENCOUNTER — HOSPITAL ENCOUNTER (OUTPATIENT)
Dept: VASCULAR LAB | Age: 73
Discharge: HOME | End: 2018-10-26
Payer: MEDICARE

## 2018-10-26 ENCOUNTER — TELEPHONE (OUTPATIENT)
Dept: FAMILY MEDICINE CLINIC | Age: 73
End: 2018-10-26

## 2018-10-26 ENCOUNTER — OFFICE VISIT (OUTPATIENT)
Dept: FAMILY MEDICINE CLINIC | Age: 73
End: 2018-10-26
Payer: MEDICARE

## 2018-10-26 VITALS
DIASTOLIC BLOOD PRESSURE: 70 MMHG | BODY MASS INDEX: 36.07 KG/M2 | WEIGHT: 238 LBS | HEIGHT: 68 IN | SYSTOLIC BLOOD PRESSURE: 118 MMHG

## 2018-10-26 DIAGNOSIS — M79.662 PAIN AND SWELLING OF LEFT LOWER LEG: Primary | ICD-10-CM

## 2018-10-26 DIAGNOSIS — M79.89 PAIN AND SWELLING OF LEFT LOWER LEG: Primary | ICD-10-CM

## 2018-10-26 DIAGNOSIS — M79.662 PAIN AND SWELLING OF LEFT LOWER LEG: ICD-10-CM

## 2018-10-26 DIAGNOSIS — M79.89 PAIN AND SWELLING OF LEFT LOWER LEG: ICD-10-CM

## 2018-10-26 PROCEDURE — 4040F PNEUMOC VAC/ADMIN/RCVD: CPT | Performed by: FAMILY MEDICINE

## 2018-10-26 PROCEDURE — G8417 CALC BMI ABV UP PARAM F/U: HCPCS | Performed by: FAMILY MEDICINE

## 2018-10-26 PROCEDURE — 3017F COLORECTAL CA SCREEN DOC REV: CPT | Performed by: FAMILY MEDICINE

## 2018-10-26 PROCEDURE — 99213 OFFICE O/P EST LOW 20 MIN: CPT | Performed by: FAMILY MEDICINE

## 2018-10-26 PROCEDURE — G8598 ASA/ANTIPLAT THER USED: HCPCS | Performed by: FAMILY MEDICINE

## 2018-10-26 PROCEDURE — 93971 EXTREMITY STUDY: CPT

## 2018-10-26 PROCEDURE — G8427 DOCREV CUR MEDS BY ELIG CLIN: HCPCS | Performed by: FAMILY MEDICINE

## 2018-10-26 PROCEDURE — 1123F ACP DISCUSS/DSCN MKR DOCD: CPT | Performed by: FAMILY MEDICINE

## 2018-10-26 PROCEDURE — G8482 FLU IMMUNIZE ORDER/ADMIN: HCPCS | Performed by: FAMILY MEDICINE

## 2018-10-26 PROCEDURE — 1101F PT FALLS ASSESS-DOCD LE1/YR: CPT | Performed by: FAMILY MEDICINE

## 2018-10-26 PROCEDURE — 1036F TOBACCO NON-USER: CPT | Performed by: FAMILY MEDICINE

## 2018-10-26 RX ORDER — PREDNISONE 10 MG/1
TABLET ORAL
Qty: 21 TABLET | Refills: 0 | Status: SHIPPED | OUTPATIENT
Start: 2018-10-26 | End: 2018-11-05

## 2018-10-26 NOTE — PATIENT INSTRUCTIONS
Please call your pharmacy if you need any refills of your medication(s). Please call our office at [phone] if you don't hear from us about your test results. Please be sure to call our office if your illness/problem has been treated for but has not completely resolved. Bring an accurate list of your medications with you at every appointment to ensure that we have the correct information. Our office hours are: Monday - Friday 7 am- 5 pm; Saturdays vary on doctor working. Phone lines turn on at 8 am..

## 2018-11-08 ENCOUNTER — TELEPHONE (OUTPATIENT)
Dept: FAMILY MEDICINE CLINIC | Age: 73
End: 2018-11-08

## 2018-11-08 RX ORDER — FLASH GLUCOSE SCANNING READER
EACH MISCELLANEOUS
Qty: 6 DEVICE | Refills: 3 | Status: SHIPPED | OUTPATIENT
Start: 2018-11-08 | End: 2018-12-06 | Stop reason: ALTCHOICE

## 2018-11-08 RX ORDER — FLASH GLUCOSE SENSOR
KIT MISCELLANEOUS
Qty: 6 EACH | Refills: 3 | Status: SHIPPED | OUTPATIENT
Start: 2018-11-08 | End: 2020-01-01 | Stop reason: ALTCHOICE

## 2018-11-19 ENCOUNTER — TELEPHONE (OUTPATIENT)
Dept: FAMILY MEDICINE CLINIC | Age: 73
End: 2018-11-19

## 2018-11-30 RX ORDER — ATORVASTATIN CALCIUM 80 MG/1
TABLET, FILM COATED ORAL
Qty: 90 TABLET | Refills: 1 | Status: SHIPPED | OUTPATIENT
Start: 2018-11-30 | End: 2019-03-25 | Stop reason: SDUPTHER

## 2018-11-30 NOTE — PROGRESS NOTES
12/8/2017    Axel Hudson is a 81 y.o. male    Chief Complaint   Patient presents with    Anxiety     Med check       SUBJECTIVE  HPI Corene Bending Hicksis here today for a med refill -- uses the xanax to help him sleep    Last xanax taken last night  Review of Systems   Constitutional: Negative for chills, fatigue and fever. Respiratory: Negative for shortness of breath. Cardiovascular: Negative for chest pain and leg swelling. Psychiatric/Behavioral: Positive for sleep disturbance. Controlled Substances Monitoring:     Attestation: The Prescription Monitoring Report for this patient was reviewed today. Carlee Perez DO)  Documentation: Existing medication contract. Carlee Perez DO)   See scanned oarrs with pill count and refill dates    OBJECTIVE  Physical Exam   Constitutional: He appears well-developed and well-nourished. Neck: Normal range of motion. Neck supple. No thyromegaly present. Cardiovascular: Normal rate and regular rhythm. Pulmonary/Chest: Effort normal and breath sounds normal.   Musculoskeletal: Normal range of motion. He exhibits no edema. Vitals reviewed. Allergies  Cipro xr; Claritin [loratadine];  Levofloxacin; and Peppermint flavor [flavoring agent]    Current Outpatient Prescriptions   Medication Sig Dispense Refill    metFORMIN (GLUCOPHAGE) 500 MG tablet Take 1 tablet by mouth 2 times daily (with meals) 180 tablet 0    atorvastatin (LIPITOR) 80 MG tablet TAKE 1 TABLET EVERY DAY 90 tablet 1    gabapentin (NEURONTIN) 100 MG capsule TAKE 2 CAPSULES EVERY  capsule 1    torsemide (DEMADEX) 20 MG tablet Take 2 tablets by mouth daily 60 tablet 0    metoprolol succinate (TOPROL XL) 100 MG extended release tablet TAKE 1 TABLET EVERY DAY 30 tablet 0    glimepiride (AMARYL) 2 MG tablet Take 1 tablet by mouth every morning 30 tablet 3    ramipril (ALTACE) 10 MG capsule TAKE 1 CAPSULE EVERY DAY 90 capsule 3    Insulin Syringe-Needle Iv team was messaged for iv site for hospice house. U-100 (INSULIN SYRINGE .5CC/30GX1/2\") 30G X 1/2\" 0.5 ML MISC Use 5 times a day with insulin. GENERIC IS OK. 200 each 12    Insulin Pen Needle (B-D ULTRAFINE III SHORT PEN) 31G X 8 MM MISC 1 each by Does not apply route daily 100 each 3    albuterol sulfate HFA (PROAIR HFA) 108 (90 BASE) MCG/ACT inhaler Inhale 2 puffs into the lungs every 4 hours as needed for Wheezing or Shortness of Breath 1 Inhaler 0    glucose blood VI test strips (ACCU-CHEK CARY) strip 1 each by In Vitro route daily As needed. DX: E11.9 100 each 3    Lancets MISC accu-check cary  DX: E11.9 100 each 3    Blood Glucose Monitoring Suppl (ACCU-CHEK CARY PLUS) W/DEVICE KIT 1 Device by Does not apply route 2 times daily Dx: E11.9 1 kit 0    Blood Glucose Calibration (ACCU-CHEK INSTANT CONTROL) LIQD 1 Bottle by In Vitro route 2 times daily 1 each 0    Alcohol Swabs (B-D SINGLE USE SWABS REGULAR) PADS 1 each by Does not apply route 2 times daily 100 each 0    metolazone (ZAROXOLYN) 2.5 MG tablet Take 1 tablet by mouth as needed (take as needed for weight gain of 2 pounds in 48 hours, make take daily as needed). 90 tablet 3    NOVOLOG FLEXPEN 100 UNIT/ML injection INJECT 20-25 UNITS AS PER SLIDING SCALE WITH EACH MEAL 30 Pen 11    ALPRAZolam (XANAX) 0.5 MG tablet 1 po at hs prn anxiety. No etoh. No driving. Do NOT take with ambien. . 30 tablet 0    ELIQUIS 5 MG TABS tablet TAKE 1 TABLET BY MOUTH 2 TIMES DAILY 180 tablet 3    sildenafil (VIAGRA) 50 MG tablet Take 1 tablet by mouth as needed for Erectile Dysfunction Take 1 hour prior to sexual intercourse. May take 1/2 tablet initially. If no improvement take an additional 1/2 tablet. 15 tablet 1    insulin aspart (NOVOLOG) 100 UNIT/ML injection 20-25 units per meal as per sliding scale 10 Pen 12     No current facility-administered medications for this visit. Vitals:    12/08/17 1304   BP: 122/70   Pulse: 56   Temp: 97.4 °F (36.3 °C)     Body mass index is 37.1 kg/m².   Lab Review Hospital Outpatient Visit on 11/06/2017   Component Date Value    Ventricular Rate 11/06/2017 88     Atrial Rate 11/06/2017 115     QRS Duration 11/06/2017 138     Q-T Interval 11/06/2017 440     QTc Calculation (Bazett) 11/06/2017 532     R Axis 11/06/2017 126     T Axis 11/06/2017 208     Diagnosis 11/06/2017                      Value:Electronic ventricular pacemaker  When compared with ECG of 22-SEP-2015 07:12,  Vent. rate has decreased BY   6 BPM  Confirmed by LEONARD JUSTICE [facility] MD, Gustavus Boxer ([phone]) on 11/6/2017 6:13:03 PM      Ventricular Rate 11/06/2017 89     Atrial Rate 11/06/2017 93     QRS Duration 11/06/2017 176     Q-T Interval 11/06/2017 486     QTc Calculation (Bazett) 11/06/2017 591     R Axis 11/06/2017 267     T Axis 11/06/2017 101     Diagnosis 11/06/2017                      Value:Electronic ventricular pacemaker  When compared with ECG of 06-NOV-2017 13:26,  No significant change was found  Confirmed by LEONARD JUSTICE [facility] MD, Gustavus Boxer ([phone]) on 11/6/2017 6:13:10 PM     Orders Only on 10/31/2017   Component Date Value    WBC 10/31/2017 8.8     RBC 10/31/2017 5.28     Hemoglobin 10/31/2017 14.1     Hematocrit 10/31/2017 44.4     MCV 10/31/2017 84.0     MCH 10/31/2017 26.8     MCHC 10/31/2017 31.8     RDW 10/31/2017 17.6*    Platelets 31/47/2238 141     MPV 10/31/2017 11.0*    Protime 10/31/2017 19.8*    INR 10/31/2017 1.75*    Sodium 10/31/2017 141     Potassium 10/31/2017 4.0     Chloride 10/31/2017 96*    CO2 10/31/2017 29     Anion Gap 10/31/2017 16     Glucose 10/31/2017 124*    BUN 10/31/2017 37*    CREATININE 10/31/2017 1.5*    GFR Non- 10/31/2017 46*    GFR  10/31/2017 56*    Calcium 10/31/2017 9.0            ASSESSMENT  1. Medication management  Drug Panel-PM-HI Res-UR Interp-A   2. Primary insomnia         PLAN  Pt is stable on current meds. Continue with current meds.   New meds this visit:    Orders Placed This Encounter   Medications    DISCONTD: 12  Insulin Pen Needle (B-D ULTRAFINE III SHORT PEN) 31G X 8 MM MISC, 1 each by Does not apply route daily, Disp: 100 each, Rfl: 3  albuterol sulfate HFA (PROAIR HFA) 108 (90 BASE) MCG/ACT inhaler, Inhale 2 puffs into the lungs every 4 hours as needed for Wheezing or Shortness of Breath, Disp: 1 Inhaler, Rfl: 0  glucose blood VI test strips (ACCU-CHEK CARY) strip, 1 each by In Vitro route daily As needed. DX: E11.9, Disp: 100 each, Rfl: 3  Lancets MISC, accu-check cary  DX: E11.9, Disp: 100 each, Rfl: 3  Blood Glucose Monitoring Suppl (ACCU-CHEK CARY PLUS) W/DEVICE KIT, 1 Device by Does not apply route 2 times daily Dx: E11.9, Disp: 1 kit, Rfl: 0  Blood Glucose Calibration (ACCU-CHEK INSTANT CONTROL) LIQD, 1 Bottle by In Vitro route 2 times daily, Disp: 1 each, Rfl: 0  Alcohol Swabs (B-D SINGLE USE SWABS REGULAR) PADS, 1 each by Does not apply route 2 times daily, Disp: 100 each, Rfl: 0  metolazone (ZAROXOLYN) 2.5 MG tablet, Take 1 tablet by mouth as needed (take as needed for weight gain of 2 pounds in 48 hours, make take daily as needed). , Disp: 90 tablet, Rfl: 3  NOVOLOG FLEXPEN 100 UNIT/ML injection, INJECT 20-25 UNITS AS PER SLIDING SCALE WITH EACH MEAL, Disp: 30 Pen, Rfl: 11  sildenafil (VIAGRA) 50 MG tablet, Take 1 tablet by mouth as needed for Erectile Dysfunction Take 1 hour prior to sexual intercourse. May take 1/2 tablet initially. If no improvement take an additional 1/2 tablet., Disp: 15 tablet, Rfl: 1  insulin aspart (NOVOLOG) 100 UNIT/ML injection, 20-25 units per meal as per sliding scale, Disp: 10 Pen, Rfl: 12    No current facility-administered medications for this visit. Return in about 4 months (around 4/2/2018) for 30 minute appointment.   continue with the following meds:    Outpatient Encounter Prescriptions as of 12/8/2017   Medication Sig Dispense Refill    metFORMIN (GLUCOPHAGE) 500 MG tablet Take 1 tablet by mouth 2 times daily (with meals) 180 tablet 0    [DISCONTINUED]

## 2018-12-03 ASSESSMENT — ENCOUNTER SYMPTOMS: SHORTNESS OF BREATH: 0

## 2018-12-03 NOTE — PROGRESS NOTES
10/26/2018    Cody Jaramillo is a 72 y.o. male    Chief Complaint   Patient presents with    Leg Pain     Left leg pain and swelling back of the neck to the heel x 1 week getting worse       SUBJECTIVE  HPI   Cody Jaramillo is a 72 y.o. male presenting today with a chief complaint of swelling and pain in the left lower leg for the last 1 week. States his pain is getting worse. No chest pain and no increased shortness of breath. No known injury to leg. No fevers. Review of Systems   Constitutional: Negative for chills, fatigue and fever. Respiratory: Negative for shortness of breath. Cardiovascular: Positive for leg swelling. Negative for chest pain. Musculoskeletal: Positive for myalgias. OBJECTIVE  Physical Exam   Constitutional: He appears well-developed and well-nourished. + pain with palpation of left calf area and definite swelling on the left versus the right leg. Neck: Normal range of motion. Neck supple. No thyromegaly present. Cardiovascular: Normal rate and regular rhythm. Pulmonary/Chest: Effort normal and breath sounds normal.   Musculoskeletal: Normal range of motion. He exhibits no edema. Vitals reviewed. Allergies  Cipro xr; Claritin [loratadine];  Levofloxacin; and Peppermint flavor [flavoring agent]    Current Outpatient Prescriptions   Medication Sig Dispense Refill    insulin lispro (HUMALOG KWIKPEN U-200) 200 UNIT/ML SOPN pen Inject 20 Units into the skin 3 times daily (before meals) 9 pen 3    atorvastatin (LIPITOR) 80 MG tablet TAKE 1 TABLET EVERY DAY 90 tablet 1    metFORMIN (GLUCOPHAGE) 500 MG tablet TAKE 1 TABLET TWICE DAILY WITH MEALS 180 tablet 1    Continuous Blood Gluc  (FREESTYLE RICCARDO 14 DAY READER) YRN 1 device q 14 days 6 Device 3    Continuous Blood Gluc Sensor (FREESTYLE RICCARDO 14 DAY SENSOR) MISC Use one sensor every 14 days 6 each 3    ramipril (ALTACE) 10 MG capsule TAKE 1 CAPSULE EVERY DAY 90 capsule 1    kg/m².          ASSESSMENT AND PLAN     Diagnosis Orders   1. Pain and swelling of left lower leg  US DOPPLER VENOUS LEG LEFT       Pt is stable on current meds. Continue with current meds. New meds this visit:    Orders Placed This Encounter   Medications    insulin lispro (HUMALOG KWIKPEN U-200) 200 UNIT/ML SOPN pen     Sig: Inject 20 Units into the skin 3 times daily (before meals)     Dispense:  9 pen     Refill:  3    predniSONE (DELTASONE) 10 MG tablet     Si po day #1, 5 po day #2, 4 po day #3, 3 po day #4, 2 po day #5, 1 po day #6, then off. Dispense:  21 tablet     Refill:  0     New orders placed this visit:    Orders Placed This Encounter   Procedures    US DOPPLER VENOUS LEG LEFT     + swelling and very tender left lower leg for the last 1 week approx. Standing Status:   Future     Number of Occurrences:   1     Standing Expiration Date:   2018     No Follow-up on file.   continue with the following meds:    Outpatient Encounter Prescriptions as of 10/26/2018   Medication Sig Dispense Refill    insulin lispro (HUMALOG KWIKPEN U-200) 200 UNIT/ML SOPN pen Inject 20 Units into the skin 3 times daily (before meals) 9 pen 3    [] predniSONE (DELTASONE) 10 MG tablet 6 po day #1, 5 po day #2, 4 po day #3, 3 po day #4, 2 po day #5, 1 po day #6, then off. 21 tablet 0    ramipril (ALTACE) 10 MG capsule TAKE 1 CAPSULE EVERY DAY 90 capsule 1    [DISCONTINUED] metFORMIN (GLUCOPHAGE) 500 MG tablet TAKE 1 TABLET TWICE DAILY WITH MEALS 180 tablet 0    ELIQUIS 5 MG TABS tablet TAKE 1 TABLET TWICE DAILY 180 tablet 3    torsemide (DEMADEX) 20 MG tablet Take 1 tablet by mouth 2 times daily 180 tablet 2    metoprolol succinate (TOPROL XL) 100 MG extended release tablet TAKE 1 TABLET EVERY DAY 90 tablet 1    ALPRAZolam (XANAX) 0.5 MG tablet Do not refill before 10/6/18. 30 tablet 0    ALPRAZolam (XANAX) 0.5 MG tablet Do not refill before 18. 30 tablet 0    spironolactone (ALDACTONE)

## 2018-12-06 ENCOUNTER — OFFICE VISIT (OUTPATIENT)
Dept: FAMILY MEDICINE CLINIC | Age: 73
End: 2018-12-06
Payer: MEDICARE

## 2018-12-06 VITALS
WEIGHT: 237.4 LBS | BODY MASS INDEX: 35.98 KG/M2 | HEIGHT: 68 IN | SYSTOLIC BLOOD PRESSURE: 120 MMHG | DIASTOLIC BLOOD PRESSURE: 80 MMHG | TEMPERATURE: 98.4 F

## 2018-12-06 DIAGNOSIS — F41.9 ACUTE ANXIETY: ICD-10-CM

## 2018-12-06 PROCEDURE — 99213 OFFICE O/P EST LOW 20 MIN: CPT | Performed by: FAMILY MEDICINE

## 2018-12-06 PROCEDURE — G8598 ASA/ANTIPLAT THER USED: HCPCS | Performed by: FAMILY MEDICINE

## 2018-12-06 PROCEDURE — 3017F COLORECTAL CA SCREEN DOC REV: CPT | Performed by: FAMILY MEDICINE

## 2018-12-06 PROCEDURE — 1123F ACP DISCUSS/DSCN MKR DOCD: CPT | Performed by: FAMILY MEDICINE

## 2018-12-06 PROCEDURE — 4040F PNEUMOC VAC/ADMIN/RCVD: CPT | Performed by: FAMILY MEDICINE

## 2018-12-06 PROCEDURE — 1101F PT FALLS ASSESS-DOCD LE1/YR: CPT | Performed by: FAMILY MEDICINE

## 2018-12-06 PROCEDURE — G8417 CALC BMI ABV UP PARAM F/U: HCPCS | Performed by: FAMILY MEDICINE

## 2018-12-06 PROCEDURE — G8482 FLU IMMUNIZE ORDER/ADMIN: HCPCS | Performed by: FAMILY MEDICINE

## 2018-12-06 PROCEDURE — 1036F TOBACCO NON-USER: CPT | Performed by: FAMILY MEDICINE

## 2018-12-06 PROCEDURE — G8427 DOCREV CUR MEDS BY ELIG CLIN: HCPCS | Performed by: FAMILY MEDICINE

## 2018-12-06 RX ORDER — ALPRAZOLAM 0.5 MG/1
TABLET ORAL
Qty: 30 TABLET | Refills: 0 | Status: SHIPPED | OUTPATIENT
Start: 2019-01-09 | End: 2018-12-06 | Stop reason: SDUPTHER

## 2018-12-06 RX ORDER — ALPRAZOLAM 0.5 MG/1
TABLET ORAL
Qty: 30 TABLET | Refills: 0 | Status: SHIPPED | OUTPATIENT
Start: 2018-12-12 | End: 2018-12-06 | Stop reason: SDUPTHER

## 2018-12-06 RX ORDER — ALPRAZOLAM 0.5 MG/1
TABLET ORAL
Qty: 30 TABLET | Refills: 0 | Status: SHIPPED | OUTPATIENT
Start: 2019-02-09 | End: 2019-02-22 | Stop reason: SDUPTHER

## 2018-12-12 LAB
6-ACETYLMORPHINE: NOT DETECTED
7-AMINOCLONAZEPAM: NOT DETECTED
ALPHA-OH-ALPRAZOLAM: PRESENT
ALPRAZOLAM: PRESENT
AMPHETAMINE: NOT DETECTED
BARBITURATES: NOT DETECTED
BENZOYLECGONINE: NOT DETECTED
BUPRENORPHINE: NOT DETECTED
CARISOPRODOL: NOT DETECTED
CLONAZEPAM: NOT DETECTED
CODEINE: NOT DETECTED
CREATININE URINE: 54.8 MG/DL (ref 20–400)
DIAZEPAM: NOT DETECTED
DRUGS EXPECTED: NORMAL
EER PAIN MGT DRUG PANEL, HIGH RES/EMIT U: NORMAL
ETHYL GLUCURONIDE: NOT DETECTED
FENTANYL: NOT DETECTED
HYDROCODONE: NOT DETECTED
HYDROMORPHONE: NOT DETECTED
LORAZEPAM: NOT DETECTED
MARIJUANA METABOLITE: NOT DETECTED
MDA: NOT DETECTED
MDEA: NOT DETECTED
MDMA URINE: NOT DETECTED
MEPERIDINE: NOT DETECTED
METHADONE: NOT DETECTED
METHAMPHETAMINE: NOT DETECTED
METHYLPHENIDATE: NOT DETECTED
MIDAZOLAM: NOT DETECTED
MORPHINE: NOT DETECTED
NORBUPRENORPHINE, FREE: NOT DETECTED
NORDIAZEPAM: NOT DETECTED
NORFENTANYL: NOT DETECTED
NORHYDROCODONE, URINE: NOT DETECTED
NOROXYCODONE: NOT DETECTED
NOROXYMORPHONE, URINE: NOT DETECTED
OXAZEPAM: NOT DETECTED
OXYCODONE: NOT DETECTED
OXYMORPHONE: NOT DETECTED
PAIN MANAGEMENT DRUG PANEL: NORMAL
PAIN MANAGEMENT DRUG PANEL: NORMAL
PCP: NOT DETECTED
PHENTERMINE: NOT DETECTED
PROPOXYPHENE: NOT DETECTED
TAPENTADOL, URINE: NOT DETECTED
TAPENTADOL-O-SULFATE, URINE: NOT DETECTED
TEMAZEPAM: NOT DETECTED
TRAMADOL: NOT DETECTED
ZOLPIDEM: NOT DETECTED

## 2018-12-18 ENCOUNTER — NURSE ONLY (OUTPATIENT)
Dept: CARDIOLOGY CLINIC | Age: 73
End: 2018-12-18
Payer: MEDICARE

## 2018-12-18 DIAGNOSIS — Z95.810 AUTOMATIC IMPLANTABLE CARDIOVERTER-DEFIBRILLATOR IN SITU: ICD-10-CM

## 2018-12-18 DIAGNOSIS — I50.22 CHRONIC SYSTOLIC HEART FAILURE (HCC): ICD-10-CM

## 2018-12-18 DIAGNOSIS — I25.5 ISCHEMIC CARDIOMYOPATHY: ICD-10-CM

## 2018-12-18 PROCEDURE — 93296 REM INTERROG EVL PM/IDS: CPT | Performed by: INTERNAL MEDICINE

## 2018-12-18 PROCEDURE — 93295 DEV INTERROG REMOTE 1/2/MLT: CPT | Performed by: INTERNAL MEDICINE

## 2018-12-18 PROCEDURE — 93297 REM INTERROG DEV EVAL ICPMS: CPT | Performed by: INTERNAL MEDICINE

## 2018-12-19 RX ORDER — BLOOD SUGAR DIAGNOSTIC
STRIP MISCELLANEOUS
Qty: 400 STRIP | Refills: 3 | Status: SHIPPED | OUTPATIENT
Start: 2018-12-19 | End: 2020-01-01 | Stop reason: ALTCHOICE

## 2018-12-20 NOTE — PROGRESS NOTES
Carelink transmission shows normal functioning device. Current EGM shown known AF (chronic). - on Eliquis. Thoracic impedance trend stable. NSVT recorded. - on Toprol XL 100mg QD     Dr. Lashay Turk to review interrogation. See interrogation/Paceart report for further details.

## 2019-01-09 ENCOUNTER — TELEPHONE (OUTPATIENT)
Dept: FAMILY MEDICINE CLINIC | Age: 74
End: 2019-01-09

## 2019-01-09 ENCOUNTER — TELEPHONE (OUTPATIENT)
Dept: CARDIOLOGY CLINIC | Age: 74
End: 2019-01-09

## 2019-01-09 NOTE — TELEPHONE ENCOUNTER
ALAYNA pt is switching pharmacy's. Pt would like all scripts to go to [facility] at [address] [place] [address].

## 2019-01-25 ASSESSMENT — ENCOUNTER SYMPTOMS: SHORTNESS OF BREATH: 0

## 2019-02-06 ENCOUNTER — TELEPHONE (OUTPATIENT)
Dept: FAMILY MEDICINE CLINIC | Age: 74
End: 2019-02-06

## 2019-02-06 DIAGNOSIS — E11.00 UNCONTROLLED TYPE 2 DIABETES MELLITUS WITH HYPEROSMOLARITY WITHOUT COMA, WITH LONG-TERM CURRENT USE OF INSULIN (HCC): Primary | ICD-10-CM

## 2019-02-06 DIAGNOSIS — Z79.4 UNCONTROLLED TYPE 2 DIABETES MELLITUS WITH HYPEROSMOLARITY WITHOUT COMA, WITH LONG-TERM CURRENT USE OF INSULIN (HCC): Primary | ICD-10-CM

## 2019-02-06 DIAGNOSIS — I10 ESSENTIAL HYPERTENSION: ICD-10-CM

## 2019-02-13 DIAGNOSIS — I10 ESSENTIAL HYPERTENSION: ICD-10-CM

## 2019-02-13 DIAGNOSIS — Z79.4 UNCONTROLLED TYPE 2 DIABETES MELLITUS WITH HYPEROSMOLARITY WITHOUT COMA, WITH LONG-TERM CURRENT USE OF INSULIN (HCC): ICD-10-CM

## 2019-02-13 DIAGNOSIS — E11.00 UNCONTROLLED TYPE 2 DIABETES MELLITUS WITH HYPEROSMOLARITY WITHOUT COMA, WITH LONG-TERM CURRENT USE OF INSULIN (HCC): ICD-10-CM

## 2019-02-13 LAB
A/G RATIO: 1.1 (ref 1.1–2.2)
ALBUMIN SERPL-MCNC: 4 G/DL (ref 3.4–5)
ALP BLD-CCNC: 75 U/L (ref 40–129)
ALT SERPL-CCNC: 15 U/L (ref 10–40)
ANION GAP SERPL CALCULATED.3IONS-SCNC: 16 MMOL/L (ref 3–16)
AST SERPL-CCNC: 20 U/L (ref 15–37)
BILIRUB SERPL-MCNC: 0.5 MG/DL (ref 0–1)
BUN BLDV-MCNC: 41 MG/DL (ref 7–20)
CALCIUM SERPL-MCNC: 9.3 MG/DL (ref 8.3–10.6)
CHLORIDE BLD-SCNC: 98 MMOL/L (ref 99–110)
CO2: 25 MMOL/L (ref 21–32)
CREAT SERPL-MCNC: 1.8 MG/DL (ref 0.8–1.3)
CREATININE URINE: 184 MG/DL (ref 39–259)
ESTIMATED AVERAGE GLUCOSE: 162.8 MG/DL
GFR AFRICAN AMERICAN: 45
GFR NON-AFRICAN AMERICAN: 37
GLOBULIN: 3.5 G/DL
GLUCOSE BLD-MCNC: 176 MG/DL (ref 70–99)
HBA1C MFR BLD: 7.3 %
MICROALBUMIN UR-MCNC: 9.9 MG/DL
MICROALBUMIN/CREAT UR-RTO: 53.8 MG/G (ref 0–30)
POTASSIUM SERPL-SCNC: 4.4 MMOL/L (ref 3.5–5.1)
SODIUM BLD-SCNC: 139 MMOL/L (ref 136–145)
TOTAL PROTEIN: 7.5 G/DL (ref 6.4–8.2)

## 2019-02-15 ENCOUNTER — OFFICE VISIT (OUTPATIENT)
Dept: FAMILY MEDICINE CLINIC | Age: 74
End: 2019-02-15
Payer: MEDICARE

## 2019-02-15 VITALS
HEIGHT: 68 IN | BODY MASS INDEX: 36.07 KG/M2 | DIASTOLIC BLOOD PRESSURE: 80 MMHG | WEIGHT: 238 LBS | SYSTOLIC BLOOD PRESSURE: 118 MMHG

## 2019-02-15 DIAGNOSIS — N17.9 ACUTE KIDNEY INJURY SUPERIMPOSED ON CHRONIC KIDNEY DISEASE (HCC): Primary | ICD-10-CM

## 2019-02-15 DIAGNOSIS — Z23 NEED FOR TDAP VACCINATION: ICD-10-CM

## 2019-02-15 DIAGNOSIS — Z79.4 TYPE 2 DIABETES MELLITUS WITH HYPEROSMOLARITY WITHOUT COMA, WITH LONG-TERM CURRENT USE OF INSULIN (HCC): ICD-10-CM

## 2019-02-15 DIAGNOSIS — E11.00 TYPE 2 DIABETES MELLITUS WITH HYPEROSMOLARITY WITHOUT COMA, WITH LONG-TERM CURRENT USE OF INSULIN (HCC): ICD-10-CM

## 2019-02-15 DIAGNOSIS — Z23 NEED FOR PNEUMOCOCCAL VACCINATION: ICD-10-CM

## 2019-02-15 DIAGNOSIS — N18.9 ACUTE KIDNEY INJURY SUPERIMPOSED ON CHRONIC KIDNEY DISEASE (HCC): Primary | ICD-10-CM

## 2019-02-15 PROCEDURE — 90471 IMMUNIZATION ADMIN: CPT | Performed by: FAMILY MEDICINE

## 2019-02-15 PROCEDURE — 90732 PPSV23 VACC 2 YRS+ SUBQ/IM: CPT | Performed by: FAMILY MEDICINE

## 2019-02-15 PROCEDURE — 90715 TDAP VACCINE 7 YRS/> IM: CPT | Performed by: FAMILY MEDICINE

## 2019-02-15 PROCEDURE — G0009 ADMIN PNEUMOCOCCAL VACCINE: HCPCS | Performed by: FAMILY MEDICINE

## 2019-02-15 PROCEDURE — 99214 OFFICE O/P EST MOD 30 MIN: CPT | Performed by: FAMILY MEDICINE

## 2019-02-15 ASSESSMENT — PATIENT HEALTH QUESTIONNAIRE - PHQ9
SUM OF ALL RESPONSES TO PHQ QUESTIONS 1-9: 0
2. FEELING DOWN, DEPRESSED OR HOPELESS: 0
SUM OF ALL RESPONSES TO PHQ QUESTIONS 1-9: 0
SUM OF ALL RESPONSES TO PHQ9 QUESTIONS 1 & 2: 0
1. LITTLE INTEREST OR PLEASURE IN DOING THINGS: 0

## 2019-02-15 ASSESSMENT — ENCOUNTER SYMPTOMS
ALLERGIC/IMMUNOLOGIC NEGATIVE: 1
EYES NEGATIVE: 1
RESPIRATORY NEGATIVE: 1
GASTROINTESTINAL NEGATIVE: 1

## 2019-02-20 DIAGNOSIS — N17.9 ACUTE KIDNEY INJURY SUPERIMPOSED ON CHRONIC KIDNEY DISEASE (HCC): ICD-10-CM

## 2019-02-20 DIAGNOSIS — N18.9 ACUTE KIDNEY INJURY SUPERIMPOSED ON CHRONIC KIDNEY DISEASE (HCC): ICD-10-CM

## 2019-02-20 LAB
ANION GAP SERPL CALCULATED.3IONS-SCNC: 15 MMOL/L (ref 3–16)
BUN BLDV-MCNC: 40 MG/DL (ref 7–20)
CALCIUM SERPL-MCNC: 9.6 MG/DL (ref 8.3–10.6)
CHLORIDE BLD-SCNC: 100 MMOL/L (ref 99–110)
CO2: 26 MMOL/L (ref 21–32)
CREAT SERPL-MCNC: 1.5 MG/DL (ref 0.8–1.3)
GFR AFRICAN AMERICAN: 56
GFR NON-AFRICAN AMERICAN: 46
GLUCOSE BLD-MCNC: 132 MG/DL (ref 70–99)
POTASSIUM SERPL-SCNC: 3.8 MMOL/L (ref 3.5–5.1)
SODIUM BLD-SCNC: 141 MMOL/L (ref 136–145)

## 2019-02-22 ENCOUNTER — OFFICE VISIT (OUTPATIENT)
Dept: FAMILY MEDICINE CLINIC | Age: 74
End: 2019-02-22
Payer: MEDICARE

## 2019-02-22 VITALS
TEMPERATURE: 97.3 F | WEIGHT: 238 LBS | DIASTOLIC BLOOD PRESSURE: 70 MMHG | SYSTOLIC BLOOD PRESSURE: 120 MMHG | BODY MASS INDEX: 36.19 KG/M2

## 2019-02-22 DIAGNOSIS — I48.20 CHRONIC ATRIAL FIBRILLATION (HCC): ICD-10-CM

## 2019-02-22 DIAGNOSIS — N18.9 ACUTE KIDNEY INJURY SUPERIMPOSED ON CHRONIC KIDNEY DISEASE (HCC): Primary | ICD-10-CM

## 2019-02-22 DIAGNOSIS — F41.9 ACUTE ANXIETY: ICD-10-CM

## 2019-02-22 DIAGNOSIS — N17.9 ACUTE KIDNEY INJURY SUPERIMPOSED ON CHRONIC KIDNEY DISEASE (HCC): Primary | ICD-10-CM

## 2019-02-22 PROCEDURE — 99214 OFFICE O/P EST MOD 30 MIN: CPT | Performed by: FAMILY MEDICINE

## 2019-02-22 RX ORDER — ALPRAZOLAM 0.5 MG/1
TABLET ORAL
Qty: 30 TABLET | Refills: 0 | Status: SHIPPED | OUTPATIENT
Start: 2019-04-23 | End: 2019-02-22 | Stop reason: SDUPTHER

## 2019-02-22 RX ORDER — ALPRAZOLAM 0.5 MG/1
TABLET ORAL
Qty: 30 TABLET | Refills: 0 | Status: SHIPPED | OUTPATIENT
Start: 2019-05-23 | End: 2019-03-18 | Stop reason: SDUPTHER

## 2019-02-22 RX ORDER — ALPRAZOLAM 0.5 MG/1
TABLET ORAL
Qty: 30 TABLET | Refills: 0 | Status: SHIPPED | OUTPATIENT
Start: 2019-03-24 | End: 2019-02-22 | Stop reason: SDUPTHER

## 2019-02-22 ASSESSMENT — ENCOUNTER SYMPTOMS
EYES NEGATIVE: 1
GASTROINTESTINAL NEGATIVE: 1
RESPIRATORY NEGATIVE: 1
ALLERGIC/IMMUNOLOGIC NEGATIVE: 1

## 2019-03-14 ENCOUNTER — TELEPHONE (OUTPATIENT)
Dept: CARDIOLOGY CLINIC | Age: 74
End: 2019-03-14

## 2019-03-15 ENCOUNTER — OFFICE VISIT (OUTPATIENT)
Dept: CARDIOLOGY CLINIC | Age: 74
End: 2019-03-15
Payer: MEDICARE

## 2019-03-15 VITALS
SYSTOLIC BLOOD PRESSURE: 122 MMHG | HEIGHT: 68 IN | HEART RATE: 90 BPM | BODY MASS INDEX: 36.37 KG/M2 | OXYGEN SATURATION: 96 % | WEIGHT: 240 LBS | DIASTOLIC BLOOD PRESSURE: 80 MMHG

## 2019-03-15 DIAGNOSIS — N18.30 STAGE 3 CHRONIC KIDNEY DISEASE (HCC): ICD-10-CM

## 2019-03-15 DIAGNOSIS — I50.42 CHRONIC COMBINED SYSTOLIC AND DIASTOLIC CHF, NYHA CLASS 3 (HCC): Primary | ICD-10-CM

## 2019-03-15 DIAGNOSIS — I10 ESSENTIAL HYPERTENSION: ICD-10-CM

## 2019-03-15 DIAGNOSIS — I48.0 PAROXYSMAL ATRIAL FIBRILLATION (HCC): ICD-10-CM

## 2019-03-15 DIAGNOSIS — I25.10 CORONARY ARTERY DISEASE INVOLVING NATIVE CORONARY ARTERY OF NATIVE HEART WITHOUT ANGINA PECTORIS: ICD-10-CM

## 2019-03-15 DIAGNOSIS — E78.5 HYPERLIPIDEMIA LDL GOAL <70: ICD-10-CM

## 2019-03-15 DIAGNOSIS — Z95.810 ICD (IMPLANTABLE CARDIOVERTER-DEFIBRILLATOR) IN PLACE: ICD-10-CM

## 2019-03-15 PROCEDURE — 99214 OFFICE O/P EST MOD 30 MIN: CPT | Performed by: INTERNAL MEDICINE

## 2019-03-15 PROCEDURE — 93000 ELECTROCARDIOGRAM COMPLETE: CPT | Performed by: INTERNAL MEDICINE

## 2019-03-18 ENCOUNTER — TELEPHONE (OUTPATIENT)
Dept: FAMILY MEDICINE CLINIC | Age: 74
End: 2019-03-18

## 2019-03-18 DIAGNOSIS — F41.9 ACUTE ANXIETY: ICD-10-CM

## 2019-03-18 RX ORDER — ALPRAZOLAM 0.5 MG/1
TABLET ORAL
Qty: 14 TABLET | Refills: 0 | Status: SHIPPED | OUTPATIENT
Start: 2019-05-23 | End: 2019-07-16 | Stop reason: SDUPTHER

## 2019-03-25 ENCOUNTER — TELEPHONE (OUTPATIENT)
Dept: FAMILY MEDICINE CLINIC | Age: 74
End: 2019-03-25

## 2019-03-25 RX ORDER — ATORVASTATIN CALCIUM 80 MG/1
TABLET, FILM COATED ORAL
Qty: 90 TABLET | Refills: 1 | Status: ON HOLD | OUTPATIENT
Start: 2019-03-25 | End: 2019-09-27 | Stop reason: SDUPTHER

## 2019-03-26 ENCOUNTER — NURSE ONLY (OUTPATIENT)
Dept: CARDIOLOGY CLINIC | Age: 74
End: 2019-03-26
Payer: MEDICARE

## 2019-03-26 DIAGNOSIS — I25.5 ISCHEMIC CARDIOMYOPATHY: ICD-10-CM

## 2019-03-26 DIAGNOSIS — I50.23 ACUTE ON CHRONIC SYSTOLIC CONGESTIVE HEART FAILURE (HCC): ICD-10-CM

## 2019-03-26 DIAGNOSIS — Z95.810 AUTOMATIC IMPLANTABLE CARDIOVERTER-DEFIBRILLATOR IN SITU: Primary | ICD-10-CM

## 2019-03-26 PROCEDURE — 93295 DEV INTERROG REMOTE 1/2/MLT: CPT | Performed by: INTERNAL MEDICINE

## 2019-03-26 PROCEDURE — 93296 REM INTERROG EVL PM/IDS: CPT | Performed by: INTERNAL MEDICINE

## 2019-03-26 PROCEDURE — 93297 REM INTERROG DEV EVAL ICPMS: CPT | Performed by: INTERNAL MEDICINE

## 2019-03-26 RX ORDER — TORSEMIDE 20 MG/1
20 TABLET ORAL 2 TIMES DAILY
Qty: 180 TABLET | Refills: 1 | Status: ON HOLD | OUTPATIENT
Start: 2019-03-26 | End: 2019-09-28 | Stop reason: SDUPTHER

## 2019-04-16 RX ORDER — SPIRONOLACTONE 25 MG/1
TABLET ORAL
Qty: 90 TABLET | Refills: 1 | Status: SHIPPED | OUTPATIENT
Start: 2019-04-16 | End: 2019-11-08 | Stop reason: SDUPTHER

## 2019-04-22 ENCOUNTER — OFFICE VISIT (OUTPATIENT)
Dept: FAMILY MEDICINE CLINIC | Age: 74
End: 2019-04-22
Payer: MEDICARE

## 2019-04-22 VITALS
SYSTOLIC BLOOD PRESSURE: 136 MMHG | WEIGHT: 239.8 LBS | BODY MASS INDEX: 36.34 KG/M2 | DIASTOLIC BLOOD PRESSURE: 80 MMHG | HEIGHT: 68 IN | TEMPERATURE: 97.9 F

## 2019-04-22 DIAGNOSIS — J30.2 SEASONAL ALLERGIC REACTION: ICD-10-CM

## 2019-04-22 DIAGNOSIS — R68.89 ITCHY EYES: Primary | ICD-10-CM

## 2019-04-22 PROCEDURE — 99213 OFFICE O/P EST LOW 20 MIN: CPT | Performed by: FAMILY MEDICINE

## 2019-04-22 ASSESSMENT — ENCOUNTER SYMPTOMS
ALLERGIC/IMMUNOLOGIC NEGATIVE: 1
EYE ITCHING: 1
RESPIRATORY NEGATIVE: 1
GASTROINTESTINAL NEGATIVE: 1

## 2019-04-22 NOTE — PATIENT INSTRUCTIONS
1. Take Zyrtec for allergies. DO NOT take with Xanax  2. Cool compress to eyes  3. May use over the counter cortisone cream to eye lid. Use little as possible so cream does not run into eye  4. Use a tissue when rubbing eyes and toss tissue. 5. Wash hands frequently  6.  Follow up if symptoms worsen

## 2019-04-22 NOTE — PROGRESS NOTES
Subjective:      Patient ID: Ashley Stanton is a 69 y.o. male. Chief Complaint   Patient presents with    Conjunctivitis     itchy eye, dall headache, sneezing,       HPI 69 y.o. Male presenting with bilateral itching eyes and a dull headache x's 3 days. The itching is preventing sleep and driving him crazy. Right eye lid has a red patch. Review of Systems   Constitutional: Negative. HENT: Negative. Eyes: Positive for itching. Respiratory: Negative. Cardiovascular: Negative. Gastrointestinal: Negative. Endocrine: Negative. Genitourinary: Negative. Musculoskeletal: Negative. Skin: Negative. Allergic/Immunologic: Negative. Neurological: Negative. Hematological: Negative. Psychiatric/Behavioral: Negative. Denies any vision loss or changes in vision. Objective:   Physical Exam   Constitutional: He is oriented to person, place, and time. He appears well-developed and well-nourished. HENT:   Head: Normocephalic. Eyes: Conjunctivae are normal. Right eye exhibits no discharge. +red patch of hives on upper eye lid/lower brow   Neck: Normal range of motion. Cardiovascular: Normal rate, regular rhythm and normal heart sounds. Pulmonary/Chest: Effort normal and breath sounds normal.   Abdominal: Soft. Musculoskeletal: Normal range of motion. Neurological: He is alert and oriented to person, place, and time. Skin: Skin is warm and dry. Psychiatric: He has a normal mood and affect. His behavior is normal. Judgment and thought content normal.     Blood pressure 136/80, temperature 97.9 °F (36.6 °C), temperature source Oral, height 5' 8\" (1.727 m), weight 239 lb 12.8 oz (108.8 kg). Body mass index is 36.46 kg/m². Allergies   Allergen Reactions    Cipro Xr     Claritin [Loratadine]     Levofloxacin     Peppermint Flavor [Flavoring Agent] Swelling       Assessment:       Diagnosis Orders   1. Itchy eyes     2.  Seasonal allergic reaction             Plan:

## 2019-04-25 ENCOUNTER — TELEPHONE (OUTPATIENT)
Dept: FAMILY MEDICINE CLINIC | Age: 74
End: 2019-04-25

## 2019-04-25 RX ORDER — PREDNISONE 1 MG/1
TABLET ORAL
Qty: 21 TABLET | Refills: 0 | Status: SHIPPED | OUTPATIENT
Start: 2019-04-25 | End: 2019-05-10

## 2019-04-25 NOTE — TELEPHONE ENCOUNTER
Pt has decided to go ahead and ask for the steriods. But pt is wanting the [place] and shortest dosage of steroids   Pl advise.   [phone]     CAROL ANN Hernandez

## 2019-04-30 ENCOUNTER — TELEPHONE (OUTPATIENT)
Dept: FAMILY MEDICINE CLINIC | Age: 74
End: 2019-04-30

## 2019-04-30 NOTE — TELEPHONE ENCOUNTER
Pt is done with the abx and he is still not over his illness:      Still has   Tenderness on the side of head  Head congestion/pressure     Pl advise   [phone]

## 2019-05-02 NOTE — TELEPHONE ENCOUNTER
Pt is not feeling well at all, his head has a lot of pressure, eyes hurt, even tried to take tylenol and did NOT help with his pain. Pl advise.    [phone]

## 2019-05-03 ENCOUNTER — OFFICE VISIT (OUTPATIENT)
Dept: FAMILY MEDICINE CLINIC | Age: 74
End: 2019-05-03
Payer: MEDICARE

## 2019-05-03 VITALS
BODY MASS INDEX: 35.77 KG/M2 | TEMPERATURE: 97.6 F | SYSTOLIC BLOOD PRESSURE: 128 MMHG | HEIGHT: 68 IN | WEIGHT: 236 LBS | DIASTOLIC BLOOD PRESSURE: 84 MMHG

## 2019-05-03 DIAGNOSIS — R51.9 ACUTE INTRACTABLE HEADACHE, UNSPECIFIED HEADACHE TYPE: ICD-10-CM

## 2019-05-03 DIAGNOSIS — R51.9 ACUTE INTRACTABLE HEADACHE, UNSPECIFIED HEADACHE TYPE: Primary | ICD-10-CM

## 2019-05-03 LAB — SEDIMENTATION RATE, ERYTHROCYTE: 10 MM/HR (ref 0–20)

## 2019-05-03 PROCEDURE — 99213 OFFICE O/P EST LOW 20 MIN: CPT | Performed by: FAMILY MEDICINE

## 2019-05-03 RX ORDER — TRAMADOL HYDROCHLORIDE 50 MG/1
50 TABLET ORAL EVERY 6 HOURS PRN
Qty: 12 TABLET | Refills: 0 | Status: SHIPPED | OUTPATIENT
Start: 2019-05-03 | End: 2019-05-10 | Stop reason: SDUPTHER

## 2019-05-03 NOTE — TELEPHONE ENCOUNTER
Call patient  Please tell him that if the antibiotic did not help and he is having horrible pressure and pain on the side of his head, then I'm worried about other diagnoses. In other words, I'm not sure that this is still his sinuses. Please have him come in for an appointment to be assessed.

## 2019-05-03 NOTE — PROGRESS NOTES
Subjective:      Patient ID: Everardo Woo is a 69 y.o. male. Patient presents with:  Headache: right side, tender, sore to touch x 2 weeks  Otalgia: right ear x 2 weeks  Facial Pain: right side x 2 weeks    Right sore throat but he notes that it is actually on the neck on the right  Head congestion  No fever  Tender to touch  Some runny nose and pnd  occ cough  Had a sore on the upper right lid  1-2 week ago no blister    YOB: 1945    Date of Visit:  5/3/2019     -- Cipro Xr    -- Claritin (Loratadine)    -- Levofloxacin    -- Peppermint Flavor (Flavoring Agent) -- Swelling    Current Outpatient Medications:  predniSONE (DELTASONE) 5 MG tablet, 6 po today, 5 po day #2, 4 po day #3, 3 po day #4, 2 po day #5, 1 po day #6, then off., Disp: 21 tablet, Rfl: 0  spironolactone (ALDACTONE) 25 MG tablet, TAKE 1 TABLET EVERY DAY, Disp: 90 tablet, Rfl: 1  torsemide (DEMADEX) 20 MG tablet, Take 1 tablet by mouth 2 times daily, Disp: 180 tablet, Rfl: 1  atorvastatin (LIPITOR) 80 MG tablet, TAKE 1 TABLET EVERY DAY, Disp: 90 tablet, Rfl: 1  (START ON 5/23/2019) ALPRAZolam (XANAX) 0.5 MG tablet, Do not refill before 5/23/191 po q HS, Disp: 14 tablet, Rfl: 0  metFORMIN (GLUCOPHAGE) 500 MG tablet, TAKE 1 TABLET TWICE DAILY WITH MEALS, Disp: 180 tablet, Rfl: 0  ACCU-CHEK CARY PLUS strip, TEST BLOOD SUGAR FOUR TIMES DAILY, Disp: 400 strip, Rfl: 3  gabapentin (NEURONTIN) 100 MG capsule, Take 1 capsule by mouth daily for 180 days. ., Disp: 90 capsule, Rfl: 1  Insulin Pen Needle 32G X 6 MM MISC, Tid use, Disp: 300 each, Rfl: 3  Continuous Blood Gluc Sensor (FREESTYLE RICCARDO 14 DAY SENSOR) MISC, Use one sensor every 14 days, Disp: 6 each, Rfl: 3  insulin lispro (HUMALOG KWIKPEN U-200) 200 UNIT/ML SOPN pen, Inject 20 Units into the skin 3 times daily (before meals), Disp: 9 pen, Rfl: 3  ramipril (ALTACE) 10 MG capsule, TAKE 1 CAPSULE EVERY DAY, Disp: 90 capsule, Rfl: 1  ELIQUIS 5 MG TABS tablet, TAKE 1 TABLET TWICE DAILY, Disp: 180 tablet, Rfl: 3  metoprolol succinate (TOPROL XL) 100 MG extended release tablet, TAKE 1 TABLET EVERY DAY, Disp: 90 tablet, Rfl: 1  LANTUS SOLOSTAR 100 UNIT/ML injection pen, INJECT 80 UNITS SUBCUTANEOUSLY EVERY NIGHT, Disp: 75 mL, Rfl: 3  Blood Glucose Monitoring Suppl (ACCU-CHEK CARY PLUS) w/Device KIT, 1 Device by Does not apply route 2 times daily Dx: E11.9, Disp: 1 kit, Rfl: 0  Lancets MISC, accu-check cary  DX: E11.9, Disp: 100 each, Rfl: 3  insulin lispro (HUMALOG) 100 UNIT/ML injection vial, Inject 20 Units into the skin 3 times daily (before meals), Disp: , Rfl:   Insulin Syringe-Needle U-100 (INSULIN SYRINGE .5CC/30GX1/2\") 30G X 1/2\" 0.5 ML MISC, Use 5 times a day with insulin. GENERIC IS OK., Disp: 200 each, Rfl: 12  Insulin Pen Needle (B-D ULTRAFINE III SHORT PEN) 31G X 8 MM MISC, 1 each by Does not apply route daily, Disp: 100 each, Rfl: 3  albuterol sulfate HFA (PROAIR HFA) 108 (90 BASE) MCG/ACT inhaler, Inhale 2 puffs into the lungs every 4 hours as needed for Wheezing or Shortness of Breath, Disp: 1 Inhaler, Rfl: 0  Blood Glucose Calibration (ACCU-CHEK INSTANT CONTROL) LIQD, 1 Bottle by In Vitro route 2 times daily, Disp: 1 each, Rfl: 0  Alcohol Swabs (B-D SINGLE USE SWABS REGULAR) PADS, 1 each by Does not apply route 2 times daily, Disp: 100 each, Rfl: 0    No current facility-administered medications for this visit.       ---------------------------               05/03/19                      1128         ---------------------------   BP:          128/84         Site:    Left Upper Arm     Position:     Sitting        Cuff Size:   Large Adult      Temp:   97.6 °F (36.4 °C)   TempSrc:      Oral          Weight:  236 lb (107 kg)    Height:  5' 8\" (1.727 m)   ---------------------------  Body mass index is 35.88 kg/m².      Wt Readings from Last 3 Encounters:  05/03/19 : 236 lb (107 kg)  04/22/19 : 239 lb 12.8 oz (108.8 kg)  03/15/19 : 240 lb (108.9 kg)    BP Readings from Last 3 Encounters:  05/03/19 : 128/84  04/22/19 : 136/80  03/15/19 : 122/80          Review of Systems    Objective:   Physical Exam   Constitutional: He appears well-developed and well-nourished. No distress. HENT:   Head: Normocephalic and atraumatic. Right Ear: Tympanic membrane and ear canal normal.   Left Ear: Tympanic membrane and ear canal normal.   Nose: Nose normal.   Mouth/Throat: Uvula is midline, oropharynx is clear and moist and mucous membranes are normal. No oral lesions. No marks on the head or neck. the TA on the right not thickened   Neck: Neck supple. Pulmonary/Chest: Effort normal and breath sounds normal. No accessory muscle usage. No tachypnea. No respiratory distress. He has no decreased breath sounds. He has no wheezes. He has no rhonchi. He has no rales. Lymphadenopathy:        Head (right side): No submental and no submandibular adenopathy present. Head (left side): No submental and no submandibular adenopathy present. He has no cervical adenopathy. Right: No supraclavicular adenopathy present. Left: No supraclavicular adenopathy present. Neurological: He is alert. Skin: Skin is warm, dry and intact. He is not diaphoretic. No pallor. Assessment:        Diagnosis Orders   1. Acute intractable headache, unspecified headache type  Sedimentation Rate    traMADol (ULTRAM) 50 MG tablet    Sedimentation Rate       ? Shingles  No eye inflammation or pain  Orders Placed This Encounter   Medications    traMADol (ULTRAM) 50 MG tablet     Sig: Take 1 tablet by mouth every 6 hours as needed for Pain for up to 3 days. Intended supply: 3 days.  Take lowest dose possible to manage pain     Dispense:  12 tablet     Refill:  0     Reduce doses taken as pain becomes manageable           Plan:      Increase the gabapentin to three times a day  Use the pain medicine if needed        Neil Bey MD

## 2019-05-09 ENCOUNTER — TELEPHONE (OUTPATIENT)
Dept: FAMILY MEDICINE CLINIC | Age: 74
End: 2019-05-09

## 2019-05-09 NOTE — TELEPHONE ENCOUNTER
Appointment needed please if he is still in enough pain to require pain medications. Pain medications cannot be called in.

## 2019-05-09 NOTE — TELEPHONE ENCOUNTER
Pt saw Dr. Jose Martin Figueroa about his head hurting and Dr Jose Martin Figueroa is leaning towards shingles. Pt had bw done and came back ok. Pt was prescribed Tramadol and he has 1 pill left and would like to know if he could get a refill.    Pt is having a lot of pain in his head     Pl advise [phone]

## 2019-05-10 ENCOUNTER — OFFICE VISIT (OUTPATIENT)
Dept: FAMILY MEDICINE CLINIC | Age: 74
End: 2019-05-10
Payer: MEDICARE

## 2019-05-10 VITALS
TEMPERATURE: 98.1 F | DIASTOLIC BLOOD PRESSURE: 80 MMHG | WEIGHT: 239 LBS | BODY MASS INDEX: 36.34 KG/M2 | SYSTOLIC BLOOD PRESSURE: 110 MMHG

## 2019-05-10 DIAGNOSIS — R51.9 ACUTE INTRACTABLE HEADACHE, UNSPECIFIED HEADACHE TYPE: ICD-10-CM

## 2019-05-10 DIAGNOSIS — B02.9 HERPES ZOSTER WITHOUT COMPLICATION: Primary | ICD-10-CM

## 2019-05-10 PROCEDURE — 99213 OFFICE O/P EST LOW 20 MIN: CPT | Performed by: FAMILY MEDICINE

## 2019-05-10 RX ORDER — TRAMADOL HYDROCHLORIDE 50 MG/1
50 TABLET ORAL EVERY 6 HOURS PRN
Qty: 28 TABLET | Refills: 0 | Status: SHIPPED | OUTPATIENT
Start: 2019-05-10 | End: 2019-05-17

## 2019-05-10 NOTE — PROGRESS NOTES
5/10/2019    Fany Bliss is a 69 y.o. male    Chief Complaint   Patient presents with    Pain     Pain/tenderness top of right side of head ear and throat x 2-3 weeks       SUBJECTIVE  HPI     Fany Bliss is a 69 y.o. male presenting today with a chief complaint of pain in the right side of his head and face. He has a rash and scab on his left eyelid. Review of Systems   Constitutional: Negative for chills, fatigue and fever. Respiratory: Negative for shortness of breath. Cardiovascular: Negative for chest pain and leg swelling. Skin: Positive for rash. OBJECTIVE  Physical Exam   Constitutional: He appears well-developed and well-nourished. Reviewed current labs with pt. Neck: Normal range of motion. Neck supple. No thyromegaly present. Cardiovascular: Normal rate and regular rhythm. Pulmonary/Chest: Effort normal and breath sounds normal.   Musculoskeletal: Normal range of motion. He exhibits no edema. Skin: Rash noted. Vitals reviewed. Allergies  Cipro xr; Claritin [loratadine]; Levofloxacin; and Peppermint flavor [flavoring agent]    Current Outpatient Medications   Medication Sig Dispense Refill    traMADol (ULTRAM) 50 MG tablet Take 1 tablet by mouth every 6 hours as needed for Pain for up to 7 days. Do not take with xanax.  Take lowest dose possible to manage pain 28 tablet 0    spironolactone (ALDACTONE) 25 MG tablet TAKE 1 TABLET EVERY DAY 90 tablet 1    torsemide (DEMADEX) 20 MG tablet Take 1 tablet by mouth 2 times daily 180 tablet 1    atorvastatin (LIPITOR) 80 MG tablet TAKE 1 TABLET EVERY DAY 90 tablet 1    [START ON 5/23/2019] ALPRAZolam (XANAX) 0.5 MG tablet Do not refill before 5/23/19  1 po q HS 14 tablet 0    metFORMIN (GLUCOPHAGE) 500 MG tablet TAKE 1 TABLET TWICE DAILY WITH MEALS 180 tablet 0    ACCU-CHEK CARY PLUS strip TEST BLOOD SUGAR FOUR TIMES DAILY 400 strip 3    gabapentin (NEURONTIN) 100 MG capsule Take 1 capsule by mouth daily for 180 days. . 90 capsule 1    Insulin Pen Needle 32G X 6 MM MISC Tid use 300 each 3    Continuous Blood Gluc Sensor (FREESTYLE RICCARDO 14 DAY SENSOR) MISC Use one sensor every 14 days 6 each 3    insulin lispro (HUMALOG KWIKPEN U-200) 200 UNIT/ML SOPN pen Inject 20 Units into the skin 3 times daily (before meals) 9 pen 3    ramipril (ALTACE) 10 MG capsule TAKE 1 CAPSULE EVERY DAY 90 capsule 1    ELIQUIS 5 MG TABS tablet TAKE 1 TABLET TWICE DAILY 180 tablet 3    metoprolol succinate (TOPROL XL) 100 MG extended release tablet TAKE 1 TABLET EVERY DAY 90 tablet 1    LANTUS SOLOSTAR 100 UNIT/ML injection pen INJECT 80 UNITS SUBCUTANEOUSLY EVERY NIGHT 75 mL 3    Blood Glucose Monitoring Suppl (ACCU-CHEK CARY PLUS) w/Device KIT 1 Device by Does not apply route 2 times daily Dx: E11.9 1 kit 0    Lancets MISC accu-check cary  DX: E11.9 100 each 3    insulin lispro (HUMALOG) 100 UNIT/ML injection vial Inject 20 Units into the skin 3 times daily (before meals)      Insulin Syringe-Needle U-100 (INSULIN SYRINGE .5CC/30GX1/2\") 30G X 1/2\" 0.5 ML MISC Use 5 times a day with insulin. GENERIC IS OK. 200 each 12    Insulin Pen Needle (B-D ULTRAFINE III SHORT PEN) 31G X 8 MM MISC 1 each by Does not apply route daily 100 each 3    albuterol sulfate HFA (PROAIR HFA) 108 (90 BASE) MCG/ACT inhaler Inhale 2 puffs into the lungs every 4 hours as needed for Wheezing or Shortness of Breath 1 Inhaler 0    Blood Glucose Calibration (ACCU-CHEK INSTANT CONTROL) LIQD 1 Bottle by In Vitro route 2 times daily 1 each 0    Alcohol Swabs (B-D SINGLE USE SWABS REGULAR) PADS 1 each by Does not apply route 2 times daily 100 each 0     No current facility-administered medications for this visit. Vitals:    05/10/19 1458   BP: 110/80   Temp: 98.1 °F (36.7 °C)      Body mass index is 36.34 kg/m². ASSESSMENT AND PLAN     Diagnosis Orders   1.  Herpes zoster without complication  traMADol (ULTRAM) 50 MG tablet SEDIMENTATION RATE   2. Acute intractable headache, unspecified headache type  traMADol (ULTRAM) 50 MG tablet    SEDIMENTATION RATE       Pt is stable on current meds. Continue with current meds. New meds this visit:    Orders Placed This Encounter   Medications    traMADol (ULTRAM) 50 MG tablet     Sig: Take 1 tablet by mouth every 6 hours as needed for Pain for up to 7 days. Do not take with xanax. Take lowest dose possible to manage pain     Dispense:  28 tablet     Refill:  0     Reduce doses taken as pain becomes manageable     New orders placed this visit:    Orders Placed This Encounter   Procedures    SEDIMENTATION RATE     Standing Status:   Future     Number of Occurrences:   1     Standing Expiration Date:   5/10/2020    SPECIMEN REJECTION     No follow-ups on file. continue with the following meds:    Outpatient Encounter Medications as of 5/10/2019   Medication Sig Dispense Refill    traMADol (ULTRAM) 50 MG tablet Take 1 tablet by mouth every 6 hours as needed for Pain for up to 7 days. Do not take with xanax. Take lowest dose possible to manage pain 28 tablet 0    spironolactone (ALDACTONE) 25 MG tablet TAKE 1 TABLET EVERY DAY 90 tablet 1    torsemide (DEMADEX) 20 MG tablet Take 1 tablet by mouth 2 times daily 180 tablet 1    atorvastatin (LIPITOR) 80 MG tablet TAKE 1 TABLET EVERY DAY 90 tablet 1    [START ON 5/23/2019] ALPRAZolam (XANAX) 0.5 MG tablet Do not refill before 5/23/19  1 po q HS 14 tablet 0    metFORMIN (GLUCOPHAGE) 500 MG tablet TAKE 1 TABLET TWICE DAILY WITH MEALS 180 tablet 0    ACCU-CHEK CARY PLUS strip TEST BLOOD SUGAR FOUR TIMES DAILY 400 strip 3    gabapentin (NEURONTIN) 100 MG capsule Take 1 capsule by mouth daily for 180 days. . 90 capsule 1    Insulin Pen Needle 32G X 6 MM MISC Tid use 300 each 3    Continuous Blood Gluc Sensor (FREESTYLE RICCARDO 14 DAY SENSOR) MISC Use one sensor every 14 days 6 each 3    insulin lispro (HUMALOG KWIKPEN U-200) 200 UNIT/ML SOPN pen Inject 20 Units into the skin 3 times daily (before meals) 9 pen 3    ramipril (ALTACE) 10 MG capsule TAKE 1 CAPSULE EVERY DAY 90 capsule 1    ELIQUIS 5 MG TABS tablet TAKE 1 TABLET TWICE DAILY 180 tablet 3    metoprolol succinate (TOPROL XL) 100 MG extended release tablet TAKE 1 TABLET EVERY DAY 90 tablet 1    LANTUS SOLOSTAR 100 UNIT/ML injection pen INJECT 80 UNITS SUBCUTANEOUSLY EVERY NIGHT 75 mL 3    Blood Glucose Monitoring Suppl (ACCU-CHEK CARY PLUS) w/Device KIT 1 Device by Does not apply route 2 times daily Dx: E11.9 1 kit 0    Lancets MISC accu-check cary  DX: E11.9 100 each 3    insulin lispro (HUMALOG) 100 UNIT/ML injection vial Inject 20 Units into the skin 3 times daily (before meals)      Insulin Syringe-Needle U-100 (INSULIN SYRINGE .5CC/30GX1/2\") 30G X 1/2\" 0.5 ML MISC Use 5 times a day with insulin. GENERIC IS OK. 200 each 12    Insulin Pen Needle (B-D ULTRAFINE III SHORT PEN) 31G X 8 MM MISC 1 each by Does not apply route daily 100 each 3    albuterol sulfate HFA (PROAIR HFA) 108 (90 BASE) MCG/ACT inhaler Inhale 2 puffs into the lungs every 4 hours as needed for Wheezing or Shortness of Breath 1 Inhaler 0    Blood Glucose Calibration (ACCU-CHEK INSTANT CONTROL) LIQD 1 Bottle by In Vitro route 2 times daily 1 each 0    Alcohol Swabs (B-D SINGLE USE SWABS REGULAR) PADS 1 each by Does not apply route 2 times daily 100 each 0    [DISCONTINUED] predniSONE (DELTASONE) 5 MG tablet 6 po today, 5 po day #2, 4 po day #3, 3 po day #4, 2 po day #5, 1 po day #6, then off. 21 tablet 0     No facility-administered encounter medications on file as of 5/10/2019.           Dolly Bergman D.O.

## 2019-05-10 NOTE — PATIENT INSTRUCTIONS
Please call your pharmacy if you need any refills of your medication(s). Please call our office at [phone] if you don't hear from us about your test results. Please be sure to call our office if your illness/problem has been treated for but has not completely resolved. Bring an accurate list of your medications with you at every appointment to ensure that we have the correct information. Our office hours are: Monday - Friday 7 am- 5 pm; Saturdays vary on doctor working.     Phone lines turn on at 8 am.

## 2019-05-13 ENCOUNTER — TELEPHONE (OUTPATIENT)
Dept: FAMILY MEDICINE CLINIC | Age: 74
End: 2019-05-13

## 2019-05-13 DIAGNOSIS — L98.9 FACIAL LESION: Primary | ICD-10-CM

## 2019-05-13 LAB
REASON FOR REJECTION: NORMAL
REJECTED TEST: NORMAL

## 2019-05-13 NOTE — TELEPHONE ENCOUNTER
Holly soto/ [facility] called and stated that pt had a lab done for the MISCELLANEOUS SENDOUT and the test can NOT be ran. The lab got the sample late Friday night and it is only good for 48 hours. There is another test that they could possible run which is the [address], you could look that up in the [employer] with test # [id number]  Hang Kwan is needing to know what sample was collected.      Pl advise Holly @ [phone]

## 2019-05-13 NOTE — TELEPHONE ENCOUNTER
I spoke with Juliana Burt ordered lab [id number] instead. The order is in [address] [place] [address] Rd.

## 2019-05-14 DIAGNOSIS — R51.9 ACUTE INTRACTABLE HEADACHE, UNSPECIFIED HEADACHE TYPE: ICD-10-CM

## 2019-05-14 DIAGNOSIS — B02.9 HERPES ZOSTER WITHOUT COMPLICATION: ICD-10-CM

## 2019-05-14 LAB — SEDIMENTATION RATE, ERYTHROCYTE: 12 MM/HR (ref 0–20)

## 2019-05-14 RX ORDER — METOPROLOL SUCCINATE 100 MG/1
TABLET, EXTENDED RELEASE ORAL
Qty: 90 TABLET | Refills: 1 | Status: CANCELLED | OUTPATIENT
Start: 2019-05-14

## 2019-05-16 ENCOUNTER — TELEPHONE (OUTPATIENT)
Dept: FAMILY MEDICINE CLINIC | Age: 74
End: 2019-05-16

## 2019-05-16 LAB
VARICELLA-ZOSTER, PCR: DETECTED
VZ SOURCE: ABNORMAL

## 2019-05-16 ASSESSMENT — ENCOUNTER SYMPTOMS: SHORTNESS OF BREATH: 0

## 2019-05-16 NOTE — TELEPHONE ENCOUNTER
Pt calling for biopsy results and also wanted to let you know he notices that the discomfort is worse when he turns his head too much and has dull headache with it.    [phone]  Please advise,

## 2019-05-16 NOTE — TELEPHONE ENCOUNTER
Please call patient  Tell him that the sed rate again is normal. This is good. Tell him also that the biopsy that I did was rejected by the lab because it was a weekend and the specimen didn't get collected soon enough. So I don't have an answer on the biopsy. Please make sure he's feeling better. If he is not feeling better I need to know because I will order a CT scan of his head.     Thanks

## 2019-05-16 NOTE — TELEPHONE ENCOUNTER
Pt advised and verbalized understanding. PATIENT FEELING BETTER WHEN HE TAKES MEDICATION.  HE WILL CALL IF IT DOESN'T GET BETTER

## 2019-05-22 ENCOUNTER — TELEPHONE (OUTPATIENT)
Dept: FAMILY MEDICINE CLINIC | Age: 74
End: 2019-05-22

## 2019-05-22 NOTE — TELEPHONE ENCOUNTER
Pt calling has bad sore throat and cough and just feels terrible. No available appts until next week. He wants to if you can suggest something for him to take.

## 2019-05-24 RX ORDER — AMOXICILLIN 500 MG/1
500 CAPSULE ORAL 3 TIMES DAILY
Qty: 30 CAPSULE | Refills: 0 | Status: SHIPPED | OUTPATIENT
Start: 2019-05-24 | End: 2019-06-03

## 2019-05-24 NOTE — TELEPHONE ENCOUNTER
Refill done of metformin  Call patient  Tell him that I sent a prescription for amoxicillin to his pharmacy too

## 2019-05-28 ENCOUNTER — TELEPHONE (OUTPATIENT)
Dept: CARDIOLOGY CLINIC | Age: 74
End: 2019-05-28

## 2019-05-28 RX ORDER — METOPROLOL SUCCINATE 100 MG/1
TABLET, EXTENDED RELEASE ORAL
Qty: 90 TABLET | Refills: 1 | Status: SHIPPED | OUTPATIENT
Start: 2019-05-28 | End: 2020-01-01 | Stop reason: SDUPTHER

## 2019-05-28 RX ORDER — GABAPENTIN 100 MG/1
100 CAPSULE ORAL DAILY
Qty: 90 CAPSULE | Refills: 1 | Status: SHIPPED | OUTPATIENT
Start: 2019-05-28 | End: 2020-01-01 | Stop reason: SDUPTHER

## 2019-05-28 NOTE — TELEPHONE ENCOUNTER
Rajesh called in this morning wanting to talk to Dr. Patrick Saucedo. He stated that he probably needs to be seen this week. He said that his feet and ankles are swollen. I offered him an appointment with Glorya Nageotte, NP, and he didn't want it, he said he will wait and talk to Dr. Patrick Saucedo.      You can reach [facility] at #[phone]

## 2019-05-29 ENCOUNTER — TELEPHONE (OUTPATIENT)
Dept: FAMILY MEDICINE CLINIC | Age: 74
End: 2019-05-29

## 2019-05-29 ENCOUNTER — OFFICE VISIT (OUTPATIENT)
Dept: FAMILY MEDICINE CLINIC | Age: 74
End: 2019-05-29
Payer: MEDICARE

## 2019-05-29 VITALS
SYSTOLIC BLOOD PRESSURE: 110 MMHG | HEIGHT: 68 IN | TEMPERATURE: 97.5 F | BODY MASS INDEX: 36.4 KG/M2 | WEIGHT: 240.2 LBS | DIASTOLIC BLOOD PRESSURE: 60 MMHG

## 2019-05-29 DIAGNOSIS — R05.9 COUGH IN ADULT: Primary | ICD-10-CM

## 2019-05-29 PROCEDURE — 99213 OFFICE O/P EST LOW 20 MIN: CPT | Performed by: FAMILY MEDICINE

## 2019-05-29 RX ORDER — ALBUTEROL SULFATE 90 UG/1
2 AEROSOL, METERED RESPIRATORY (INHALATION) EVERY 6 HOURS PRN
Qty: 1 INHALER | Refills: 3 | Status: SHIPPED | OUTPATIENT
Start: 2019-05-29

## 2019-05-29 RX ORDER — DOXYCYCLINE HYCLATE 100 MG
100 TABLET ORAL 2 TIMES DAILY
Qty: 20 TABLET | Refills: 0 | Status: SHIPPED | OUTPATIENT
Start: 2019-05-29 | End: 2019-06-08

## 2019-06-10 ASSESSMENT — ENCOUNTER SYMPTOMS
COUGH: 1
SHORTNESS OF BREATH: 0

## 2019-06-10 NOTE — PROGRESS NOTES
5/29/2019    Afshan Esposito is a 69 y.o. male    Chief Complaint   Patient presents with    Cough     cough,sob, congestion, fever, sorethroat scrachty       SUBJECTIVE  HPI   Afshan Esposito is a 69 y.o. male presenting today with a chief complaint of ongoing cough            Review of Systems   Constitutional: Positive for fatigue. Negative for chills and fever. Respiratory: Positive for cough. Negative for shortness of breath. Cardiovascular: Negative for chest pain and leg swelling. OBJECTIVE  Physical Exam   Constitutional: He appears well-developed and well-nourished. Reviewed current labs with pt. Neck: Normal range of motion. Neck supple. No thyromegaly present. Cardiovascular: Normal rate and regular rhythm. Pulmonary/Chest: Effort normal. He has wheezes. Musculoskeletal: Normal range of motion. He exhibits no edema. Vitals reviewed. Allergies  Cipro xr; Claritin [loratadine]; Levofloxacin; and Peppermint flavor [flavoring agent]    Current Outpatient Medications   Medication Sig Dispense Refill    albuterol sulfate HFA (PROAIR HFA) 108 (90 Base) MCG/ACT inhaler Inhale 2 puffs into the lungs every 6 hours as needed for Wheezing 1 Inhaler 3    metoprolol succinate (TOPROL XL) 100 MG extended release tablet TAKE 1 TABLET EVERY DAY 90 tablet 1    gabapentin (NEURONTIN) 100 MG capsule Take 1 capsule by mouth daily for 180 days.  90 capsule 1    metFORMIN (GLUCOPHAGE) 500 MG tablet TAKE 1 TABLET TWICE DAILY WITH MEALS 180 tablet 3    spironolactone (ALDACTONE) 25 MG tablet TAKE 1 TABLET EVERY DAY 90 tablet 1    torsemide (DEMADEX) 20 MG tablet Take 1 tablet by mouth 2 times daily 180 tablet 1    atorvastatin (LIPITOR) 80 MG tablet TAKE 1 TABLET EVERY DAY 90 tablet 1    ALPRAZolam (XANAX) 0.5 MG tablet Do not refill before 5/23/19  1 po q HS 14 tablet 0    ACCU-CHEK CARY PLUS strip TEST BLOOD SUGAR FOUR TIMES DAILY 400 strip 3    Insulin Pen Needle 32G X 6 MM MISC Tid use 300 each 3    Continuous Blood Gluc Sensor (FREESTYLE RICCARDO 14 DAY SENSOR) MISC Use one sensor every 14 days 6 each 3    insulin lispro (HUMALOG KWIKPEN U-200) 200 UNIT/ML SOPN pen Inject 20 Units into the skin 3 times daily (before meals) 9 pen 3    ramipril (ALTACE) 10 MG capsule TAKE 1 CAPSULE EVERY DAY 90 capsule 1    ELIQUIS 5 MG TABS tablet TAKE 1 TABLET TWICE DAILY 180 tablet 3    LANTUS SOLOSTAR 100 UNIT/ML injection pen INJECT 80 UNITS SUBCUTANEOUSLY EVERY NIGHT 75 mL 3    Blood Glucose Monitoring Suppl (ACCU-CHEK CARY PLUS) w/Device KIT 1 Device by Does not apply route 2 times daily Dx: E11.9 1 kit 0    Lancets MISC accu-check cary  DX: E11.9 100 each 3    insulin lispro (HUMALOG) 100 UNIT/ML injection vial Inject 20 Units into the skin 3 times daily (before meals)      Insulin Syringe-Needle U-100 (INSULIN SYRINGE .5CC/30GX1/2\") 30G X 1/2\" 0.5 ML MISC Use 5 times a day with insulin. GENERIC IS OK. 200 each 12    Insulin Pen Needle (B-D ULTRAFINE III SHORT PEN) 31G X 8 MM MISC 1 each by Does not apply route daily 100 each 3    albuterol sulfate HFA (PROAIR HFA) 108 (90 BASE) MCG/ACT inhaler Inhale 2 puffs into the lungs every 4 hours as needed for Wheezing or Shortness of Breath 1 Inhaler 0    Blood Glucose Calibration (ACCU-CHEK INSTANT CONTROL) LIQD 1 Bottle by In Vitro route 2 times daily 1 each 0    Alcohol Swabs (B-D SINGLE USE SWABS REGULAR) PADS 1 each by Does not apply route 2 times daily 100 each 0     No current facility-administered medications for this visit. Vitals:    05/29/19 1230   BP: 110/60   Temp: 97.5 °F (36.4 °C)      Body mass index is 36.52 kg/m². ASSESSMENT AND PLAN     Diagnosis Orders   1. Cough in adult       Pt declines prednisone at this time  Pt is stable on current meds. Continue with current meds.   New meds this visit:    Orders Placed This Encounter   Medications    doxycycline hyclate (VIBRA-TABS) 100 MG tablet     Sig: Take 1 tablet by mouth 2 times daily for 10 days     Dispense:  20 tablet     Refill:  0    albuterol sulfate HFA (PROAIR HFA) 108 (90 Base) MCG/ACT inhaler     Sig: Inhale 2 puffs into the lungs every 6 hours as needed for Wheezing     Dispense:  1 Inhaler     Refill:  3     New orders placed this visit:  No orders of the defined types were placed in this encounter. Return if symptoms worsen or fail to improve. continue with the following meds:    Outpatient Encounter Medications as of 2019   Medication Sig Dispense Refill    [] doxycycline hyclate (VIBRA-TABS) 100 MG tablet Take 1 tablet by mouth 2 times daily for 10 days 20 tablet 0    albuterol sulfate HFA (PROAIR HFA) 108 (90 Base) MCG/ACT inhaler Inhale 2 puffs into the lungs every 6 hours as needed for Wheezing 1 Inhaler 3    metoprolol succinate (TOPROL XL) 100 MG extended release tablet TAKE 1 TABLET EVERY DAY 90 tablet 1    gabapentin (NEURONTIN) 100 MG capsule Take 1 capsule by mouth daily for 180 days.  90 capsule 1    metFORMIN (GLUCOPHAGE) 500 MG tablet TAKE 1 TABLET TWICE DAILY WITH MEALS 180 tablet 3    [] amoxicillin (AMOXIL) 500 MG capsule Take 1 capsule by mouth 3 times daily for 10 days 30 capsule 0    spironolactone (ALDACTONE) 25 MG tablet TAKE 1 TABLET EVERY DAY 90 tablet 1    torsemide (DEMADEX) 20 MG tablet Take 1 tablet by mouth 2 times daily 180 tablet 1    atorvastatin (LIPITOR) 80 MG tablet TAKE 1 TABLET EVERY DAY 90 tablet 1    ALPRAZolam (XANAX) 0.5 MG tablet Do not refill before 19  1 po q HS 14 tablet 0    ACCU-CHEK CARY PLUS strip TEST BLOOD SUGAR FOUR TIMES DAILY 400 strip 3    Insulin Pen Needle 32G X 6 MM MISC Tid use 300 each 3    Continuous Blood Gluc Sensor (FREESTYLE RICCARDO 14 DAY SENSOR) MISC Use one sensor every 14 days 6 each 3    insulin lispro (HUMALOG KWIKPEN U-200) 200 UNIT/ML SOPN pen Inject 20 Units into the skin 3 times daily (before meals) 9 pen 3    ramipril (ALTACE) 10 MG capsule TAKE 1 CAPSULE EVERY DAY 90 capsule 1    ELIQUIS 5 MG TABS tablet TAKE 1 TABLET TWICE DAILY 180 tablet 3    LANTUS SOLOSTAR 100 UNIT/ML injection pen INJECT 80 UNITS SUBCUTANEOUSLY EVERY NIGHT 75 mL 3    Blood Glucose Monitoring Suppl (ACCU-CHEK CARY PLUS) w/Device KIT 1 Device by Does not apply route 2 times daily Dx: E11.9 1 kit 0    Lancets MISC accu-check cary  DX: E11.9 100 each 3    insulin lispro (HUMALOG) 100 UNIT/ML injection vial Inject 20 Units into the skin 3 times daily (before meals)      Insulin Syringe-Needle U-100 (INSULIN SYRINGE .5CC/30GX1/2\") 30G X 1/2\" 0.5 ML MISC Use 5 times a day with insulin. GENERIC IS OK. 200 each 12    Insulin Pen Needle (B-D ULTRAFINE III SHORT PEN) 31G X 8 MM MISC 1 each by Does not apply route daily 100 each 3    albuterol sulfate HFA (PROAIR HFA) 108 (90 BASE) MCG/ACT inhaler Inhale 2 puffs into the lungs every 4 hours as needed for Wheezing or Shortness of Breath 1 Inhaler 0    Blood Glucose Calibration (ACCU-CHEK INSTANT CONTROL) LIQD 1 Bottle by In Vitro route 2 times daily 1 each 0    Alcohol Swabs (B-D SINGLE USE SWABS REGULAR) PADS 1 each by Does not apply route 2 times daily 100 each 0    [DISCONTINUED] metoprolol succinate (TOPROL XL) 100 MG extended release tablet TAKE 1 TABLET EVERY DAY 90 tablet 0     No facility-administered encounter medications on file as of 5/29/2019.           Terry Palacios D.O.

## 2019-06-11 ENCOUNTER — TELEPHONE (OUTPATIENT)
Dept: FAMILY MEDICINE CLINIC | Age: 74
End: 2019-06-11

## 2019-06-11 NOTE — TELEPHONE ENCOUNTER
Pt went to see an eye dr. And the eye dr is wanting to start pt on a new medication, pt was asked if he has a kidney disease and pt is not sure.      Pl advise   [phone]

## 2019-06-19 ENCOUNTER — TELEPHONE (OUTPATIENT)
Dept: FAMILY MEDICINE CLINIC | Age: 74
End: 2019-06-19

## 2019-06-19 NOTE — TELEPHONE ENCOUNTER
Pt is needing a new rx for     Humalog Insulin   30 day supply   [facility]     Please advise.    [phone]

## 2019-06-21 ENCOUNTER — TELEPHONE (OUTPATIENT)
Dept: INTERNAL MEDICINE CLINIC | Age: 74
End: 2019-06-21

## 2019-07-02 ENCOUNTER — NURSE ONLY (OUTPATIENT)
Dept: CARDIOLOGY CLINIC | Age: 74
End: 2019-07-02
Payer: MEDICARE

## 2019-07-02 DIAGNOSIS — Z95.810 AUTOMATIC IMPLANTABLE CARDIOVERTER-DEFIBRILLATOR IN SITU: ICD-10-CM

## 2019-07-02 DIAGNOSIS — I25.5 ISCHEMIC CARDIOMYOPATHY: ICD-10-CM

## 2019-07-02 DIAGNOSIS — I50.23 ACUTE ON CHRONIC SYSTOLIC CONGESTIVE HEART FAILURE (HCC): ICD-10-CM

## 2019-07-02 PROCEDURE — 93297 REM INTERROG DEV EVAL ICPMS: CPT | Performed by: INTERNAL MEDICINE

## 2019-07-02 PROCEDURE — 93295 DEV INTERROG REMOTE 1/2/MLT: CPT | Performed by: INTERNAL MEDICINE

## 2019-07-02 PROCEDURE — 93296 REM INTERROG EVL PM/IDS: CPT | Performed by: INTERNAL MEDICINE

## 2019-07-02 NOTE — PROGRESS NOTES
Remote/Carelink interrogation shows normal device function. Approximately 19 months remain on battery life. Thoracic impedance is stable. Current EGM shows known AF.      - on Eliquis  3 new NSVT events recorded. - AF w/ RVR / NSVT during AF       - on Metoprolol       - 2018 echo shows EF of 30%    Dr. Maikel Kessler to review interrogation. See interrogation/Paceart report for further details.

## 2019-07-02 NOTE — LETTER
[address]- [phone]    Pacemaker/Defibrillator Clinic          07/02/19        Chichi Hopson  [facility]        Dear Chichi Hopson    This letter is to inform you that we received the transmission from your monitor at home that checks your pacemaker and/or defibrillator, or implanted heart monitor. The next date your monitor will automatically transmit will be 10/8/19. Your device and monitor are wireless and most transmit cellularly, but please periodically check your monitor is still plugged in to the electrical outlet. If you still use the telephone land line to send please ensure the connection to the phone ana maria is secure. This will help to ensure successful automatic transmissions in the future. Also, the monitor needs to be close to you while sleeping at night. Please be aware that the remote device transmission sites are periodically monitored only during regular business hours during which simultaneous in-office device clinics are being run. If your transmission requires attention, we will contact you as soon as possible. Also, our records indicate that it has been over a year since your last in-office device interrogation. Please contact our office to schedule an appointment with our device clinic. Thank you.             Jevon Mendenhall

## 2019-07-16 ENCOUNTER — OFFICE VISIT (OUTPATIENT)
Dept: FAMILY MEDICINE CLINIC | Age: 74
End: 2019-07-16
Payer: MEDICARE

## 2019-07-16 VITALS
TEMPERATURE: 98.1 F | WEIGHT: 315 LBS | HEIGHT: 68 IN | DIASTOLIC BLOOD PRESSURE: 60 MMHG | BODY MASS INDEX: 47.74 KG/M2 | SYSTOLIC BLOOD PRESSURE: 134 MMHG

## 2019-07-16 DIAGNOSIS — F41.9 ACUTE ANXIETY: Primary | ICD-10-CM

## 2019-07-16 DIAGNOSIS — F51.01 PRIMARY INSOMNIA: ICD-10-CM

## 2019-07-16 PROCEDURE — 99213 OFFICE O/P EST LOW 20 MIN: CPT | Performed by: FAMILY MEDICINE

## 2019-07-16 RX ORDER — ALPRAZOLAM 0.5 MG/1
TABLET ORAL
Qty: 30 TABLET | Refills: 0 | Status: SHIPPED | OUTPATIENT
Start: 2019-07-16 | End: 2019-07-16 | Stop reason: SDUPTHER

## 2019-07-16 RX ORDER — ALPRAZOLAM 0.5 MG/1
TABLET ORAL
Qty: 30 TABLET | Refills: 0 | Status: SHIPPED | OUTPATIENT
Start: 2019-07-16 | End: 2019-11-26 | Stop reason: SDUPTHER

## 2019-07-16 RX ORDER — RAMIPRIL 10 MG/1
CAPSULE ORAL
Qty: 90 CAPSULE | Refills: 3 | Status: ON HOLD | OUTPATIENT
Start: 2019-07-16 | End: 2019-10-03 | Stop reason: HOSPADM

## 2019-07-16 NOTE — PATIENT INSTRUCTIONS
Thank you for choosing [facility]. Please bring a current list of medications to every appointment. Please contact your pharmacy for any prescription refill(s) that you are requesting.

## 2019-07-16 NOTE — PROGRESS NOTES
Temp: 98.1 °F (36.7 °C)      Body mass index is 53.83 kg/m². Controlled Substance Monitoring:    Acute and Chronic Pain Monitoring:   RX Monitoring 2019   Attestation -   Periodic Controlled Substance Monitoring Possible medication side effects, risk of tolerance/dependence & alternative treatments discussed. ;No signs of potential drug abuse or diversion identified. ASSESSMENT AND PLAN     Diagnosis Orders   1. Acute anxiety  ALPRAZolam (XANAX) 0.5 MG tablet    DISCONTINUED: ALPRAZolam (XANAX) 0.5 MG tablet    DISCONTINUED: ALPRAZolam (XANAX) 0.5 MG tablet   2. Primary insomnia         Pt is stable on current meds. Continue with current meds. New meds this visit:    Orders Placed This Encounter   Medications    DISCONTD: ALPRAZolam (XANAX) 0.5 MG tablet     Si po q HS     Dispense:  30 tablet     Refill:  0    DISCONTD: ALPRAZolam (XANAX) 0.5 MG tablet     Si po q HS. Do not refill before 19     Dispense:  30 tablet     Refill:  0    ALPRAZolam (XANAX) 0.5 MG tablet     Si po q HS. Do not refill before 19     Dispense:  30 tablet     Refill:  0    insulin glargine (LANTUS SOLOSTAR) 100 UNIT/ML injection pen     Sig: INJECT 80 UNITS SUBCUTANEOUSLY EVERY NIGHT     Dispense:  8 pen     Refill:  5    ramipril (ALTACE) 10 MG capsule     Sig: TAKE 1 CAPSULE EVERY DAY     Dispense:  90 capsule     Refill:  3     New orders placed this visit:  No orders of the defined types were placed in this encounter. Return in about 3 months (around 10/16/2019) for recheck meds. continue with the following meds:    Outpatient Encounter Medications as of 2019   Medication Sig Dispense Refill    ALPRAZolam (XANAX) 0.5 MG tablet 1 po q HS.   Do not refill before 19 30 tablet 0    insulin glargine (LANTUS SOLOSTAR) 100 UNIT/ML injection pen INJECT 80 UNITS SUBCUTANEOUSLY EVERY NIGHT 8 pen 5    ramipril (ALTACE) 10 MG capsule TAKE 1 CAPSULE EVERY DAY 90 capsule 3    insulin lispro (HUMALOG KWIKPEN U-200) 200 UNIT/ML SOPN pen Inject 20 Units into the skin 3 times daily (before meals) 9 pen 0    albuterol sulfate HFA (PROAIR HFA) 108 (90 Base) MCG/ACT inhaler Inhale 2 puffs into the lungs every 6 hours as needed for Wheezing 1 Inhaler 3    metoprolol succinate (TOPROL XL) 100 MG extended release tablet TAKE 1 TABLET EVERY DAY 90 tablet 1    gabapentin (NEURONTIN) 100 MG capsule Take 1 capsule by mouth daily for 180 days. 90 capsule 1    metFORMIN (GLUCOPHAGE) 500 MG tablet TAKE 1 TABLET TWICE DAILY WITH MEALS 180 tablet 3    spironolactone (ALDACTONE) 25 MG tablet TAKE 1 TABLET EVERY DAY 90 tablet 1    torsemide (DEMADEX) 20 MG tablet Take 1 tablet by mouth 2 times daily 180 tablet 1    atorvastatin (LIPITOR) 80 MG tablet TAKE 1 TABLET EVERY DAY 90 tablet 1    ACCU-CHEK CARY PLUS strip TEST BLOOD SUGAR FOUR TIMES DAILY 400 strip 3    Insulin Pen Needle 32G X 6 MM MISC Tid use 300 each 3    Continuous Blood Gluc Sensor (FREESTYLE RICCARDO 14 DAY SENSOR) MISC Use one sensor every 14 days 6 each 3    ELIQUIS 5 MG TABS tablet TAKE 1 TABLET TWICE DAILY 180 tablet 3    Blood Glucose Monitoring Suppl (ACCU-CHEK CARY PLUS) w/Device KIT 1 Device by Does not apply route 2 times daily Dx: E11.9 1 kit 0    Lancets MISC accu-check cary  DX: E11.9 100 each 3    insulin lispro (HUMALOG) 100 UNIT/ML injection vial Inject 20 Units into the skin 3 times daily (before meals)      Insulin Syringe-Needle U-100 (INSULIN SYRINGE .5CC/30GX1/2\") 30G X 1/2\" 0.5 ML MISC Use 5 times a day with insulin. GENERIC IS OK.  200 each 12    Insulin Pen Needle (B-D ULTRAFINE III SHORT PEN) 31G X 8 MM MISC 1 each by Does not apply route daily 100 each 3    albuterol sulfate HFA (PROAIR HFA) 108 (90 BASE) MCG/ACT inhaler Inhale 2 puffs into the lungs every 4 hours as needed for Wheezing or Shortness of Breath 1 Inhaler 0    Blood Glucose Calibration (ACCU-CHEK INSTANT CONTROL) LIQD 1 Bottle by In Vitro route 2 times daily 1 each 0    Alcohol Swabs (B-D SINGLE USE SWABS REGULAR) PADS 1 each by Does not apply route 2 times daily 100 each 0    [DISCONTINUED] ALPRAZolam (XANAX) 0.5 MG tablet 1 po q HS 30 tablet 0    [DISCONTINUED] ALPRAZolam (XANAX) 0.5 MG tablet 1 po q HS. Do not refill before 8/16/19 30 tablet 0    [DISCONTINUED] ALPRAZolam (XANAX) 0.5 MG tablet Do not refill before 5/23/19  1 po q HS 14 tablet 0    [DISCONTINUED] ramipril (ALTACE) 10 MG capsule TAKE 1 CAPSULE EVERY DAY 90 capsule 1    [DISCONTINUED] LANTUS SOLOSTAR 100 UNIT/ML injection pen INJECT 80 UNITS SUBCUTANEOUSLY EVERY NIGHT 75 mL 3     No facility-administered encounter medications on file as of 7/16/2019.           Sheryle Cristal, D.O.

## 2019-08-01 ASSESSMENT — ENCOUNTER SYMPTOMS: SHORTNESS OF BREATH: 0

## 2019-09-19 NOTE — TELEPHONE ENCOUNTER
Both of pt insulins went up in price from 42. To 101. Pt can't afford this. Can he get samples? Or is there a cheaper one he can use? Please give pt a call back.     [phone]

## 2019-09-26 ENCOUNTER — OFFICE VISIT (OUTPATIENT)
Dept: FAMILY MEDICINE CLINIC | Age: 74
End: 2019-09-26
Payer: MEDICARE

## 2019-09-26 VITALS
WEIGHT: 247 LBS | TEMPERATURE: 97.4 F | DIASTOLIC BLOOD PRESSURE: 80 MMHG | HEIGHT: 68 IN | SYSTOLIC BLOOD PRESSURE: 110 MMHG | BODY MASS INDEX: 37.44 KG/M2

## 2019-09-26 DIAGNOSIS — Z79.4 UNCONTROLLED TYPE 2 DIABETES MELLITUS WITH HYPEROSMOLARITY WITHOUT COMA, WITH LONG-TERM CURRENT USE OF INSULIN (HCC): ICD-10-CM

## 2019-09-26 DIAGNOSIS — Z12.5 PROSTATE CANCER SCREENING: ICD-10-CM

## 2019-09-26 DIAGNOSIS — Z23 NEEDS FLU SHOT: ICD-10-CM

## 2019-09-26 DIAGNOSIS — N18.9 ACUTE KIDNEY INJURY SUPERIMPOSED ON CHRONIC KIDNEY DISEASE (HCC): Primary | ICD-10-CM

## 2019-09-26 DIAGNOSIS — N17.9 ACUTE KIDNEY INJURY SUPERIMPOSED ON CHRONIC KIDNEY DISEASE (HCC): Primary | ICD-10-CM

## 2019-09-26 DIAGNOSIS — E11.00 UNCONTROLLED TYPE 2 DIABETES MELLITUS WITH HYPEROSMOLARITY WITHOUT COMA, WITH LONG-TERM CURRENT USE OF INSULIN (HCC): ICD-10-CM

## 2019-09-26 PROCEDURE — 90653 IIV ADJUVANT VACCINE IM: CPT | Performed by: FAMILY MEDICINE

## 2019-09-26 PROCEDURE — G0008 ADMIN INFLUENZA VIRUS VAC: HCPCS | Performed by: FAMILY MEDICINE

## 2019-09-26 PROCEDURE — 99213 OFFICE O/P EST LOW 20 MIN: CPT | Performed by: FAMILY MEDICINE

## 2019-09-26 ASSESSMENT — ENCOUNTER SYMPTOMS: SHORTNESS OF BREATH: 0

## 2019-09-29 ENCOUNTER — APPOINTMENT (OUTPATIENT)
Dept: GENERAL RADIOLOGY | Age: 74
DRG: 291 | End: 2019-09-29
Payer: MEDICARE

## 2019-09-29 ENCOUNTER — HOSPITAL ENCOUNTER (INPATIENT)
Age: 74
LOS: 4 days | Discharge: HOME | DRG: 291 | End: 2019-10-03
Attending: INTERNAL MEDICINE | Admitting: INTERNAL MEDICINE
Payer: MEDICARE

## 2019-09-29 DIAGNOSIS — I50.9 ACUTE ON CHRONIC CONGESTIVE HEART FAILURE, UNSPECIFIED HEART FAILURE TYPE (HCC): Primary | ICD-10-CM

## 2019-09-29 DIAGNOSIS — I50.23 ACUTE ON CHRONIC SYSTOLIC CONGESTIVE HEART FAILURE, NYHA CLASS 4 (HCC): ICD-10-CM

## 2019-09-29 PROBLEM — N17.9 AKI (ACUTE KIDNEY INJURY) (HCC): Status: ACTIVE | Noted: 2019-09-29

## 2019-09-29 PROBLEM — I50.43 CHF (CONGESTIVE HEART FAILURE), NYHA CLASS I, ACUTE ON CHRONIC, COMBINED (HCC): Status: ACTIVE | Noted: 2019-09-29

## 2019-09-29 LAB
ALBUMIN SERPL-MCNC: 3.9 G/DL (ref 3.4–5)
ALP BLD-CCNC: 85 U/L (ref 40–129)
ALT SERPL-CCNC: 15 U/L (ref 10–40)
ANION GAP SERPL CALCULATED.3IONS-SCNC: 13 MMOL/L (ref 3–16)
AST SERPL-CCNC: 17 U/L (ref 15–37)
BASOPHILS ABSOLUTE: 0.1 K/UL (ref 0–0.2)
BASOPHILS RELATIVE PERCENT: 0.7 %
BILIRUB SERPL-MCNC: 0.6 MG/DL (ref 0–1)
BILIRUBIN DIRECT: <0.2 MG/DL (ref 0–0.3)
BILIRUBIN, INDIRECT: NORMAL MG/DL (ref 0–1)
BUN BLDV-MCNC: 29 MG/DL (ref 7–20)
CALCIUM SERPL-MCNC: 9.5 MG/DL (ref 8.3–10.6)
CHLORIDE BLD-SCNC: 100 MMOL/L (ref 99–110)
CO2: 27 MMOL/L (ref 21–32)
CREAT SERPL-MCNC: 1.5 MG/DL (ref 0.8–1.3)
EOSINOPHILS ABSOLUTE: 0.1 K/UL (ref 0–0.6)
EOSINOPHILS RELATIVE PERCENT: 0.8 %
GFR AFRICAN AMERICAN: 56
GFR NON-AFRICAN AMERICAN: 46
GLUCOSE BLD-MCNC: 174 MG/DL (ref 70–99)
HCT VFR BLD CALC: 42.5 % (ref 40.5–52.5)
HEMOGLOBIN: 13.8 G/DL (ref 13.5–17.5)
LYMPHOCYTES ABSOLUTE: 0.6 K/UL (ref 1–5.1)
LYMPHOCYTES RELATIVE PERCENT: 7.5 %
MCH RBC QN AUTO: 29.6 PG (ref 26–34)
MCHC RBC AUTO-ENTMCNC: 32.4 G/DL (ref 31–36)
MCV RBC AUTO: 91.4 FL (ref 80–100)
MONOCYTES ABSOLUTE: 0.7 K/UL (ref 0–1.3)
MONOCYTES RELATIVE PERCENT: 8.8 %
NEUTROPHILS ABSOLUTE: 6.7 K/UL (ref 1.7–7.7)
NEUTROPHILS RELATIVE PERCENT: 82.2 %
PDW BLD-RTO: 15.9 % (ref 12.4–15.4)
PLATELET # BLD: 136 K/UL (ref 135–450)
PMV BLD AUTO: 10.7 FL (ref 5–10.5)
POTASSIUM REFLEX MAGNESIUM: 4.2 MMOL/L (ref 3.5–5.1)
PRO-BNP: 8050 PG/ML (ref 0–124)
RBC # BLD: 4.65 M/UL (ref 4.2–5.9)
SODIUM BLD-SCNC: 140 MMOL/L (ref 136–145)
TOTAL PROTEIN: 7.7 G/DL (ref 6.4–8.2)
TROPONIN: 0.03 NG/ML
WBC # BLD: 8.1 K/UL (ref 4–11)

## 2019-09-29 PROCEDURE — 93005 ELECTROCARDIOGRAM TRACING: CPT | Performed by: EMERGENCY MEDICINE

## 2019-09-29 PROCEDURE — 1200000000 HC SEMI PRIVATE

## 2019-09-29 PROCEDURE — 36415 COLL VENOUS BLD VENIPUNCTURE: CPT

## 2019-09-29 PROCEDURE — 83880 ASSAY OF NATRIURETIC PEPTIDE: CPT

## 2019-09-29 PROCEDURE — 99285 EMERGENCY DEPT VISIT HI MDM: CPT

## 2019-09-29 PROCEDURE — 96374 THER/PROPH/DIAG INJ IV PUSH: CPT

## 2019-09-29 PROCEDURE — 80048 BASIC METABOLIC PNL TOTAL CA: CPT

## 2019-09-29 PROCEDURE — 85025 COMPLETE CBC W/AUTO DIFF WBC: CPT

## 2019-09-29 PROCEDURE — 80076 HEPATIC FUNCTION PANEL: CPT

## 2019-09-29 PROCEDURE — 84484 ASSAY OF TROPONIN QUANT: CPT

## 2019-09-29 PROCEDURE — 71046 X-RAY EXAM CHEST 2 VIEWS: CPT

## 2019-09-29 PROCEDURE — 6360000002 HC RX W HCPCS: Performed by: PHYSICIAN ASSISTANT

## 2019-09-29 RX ORDER — PREDNISOLONE ACETATE 10 MG/ML
1 SUSPENSION/ DROPS OPHTHALMIC 2 TIMES DAILY
COMMUNITY
End: 2020-01-01 | Stop reason: SDUPTHER

## 2019-09-29 RX ORDER — INSULIN GLARGINE 100 [IU]/ML
72 INJECTION, SOLUTION SUBCUTANEOUS NIGHTLY
Status: ON HOLD | COMMUNITY
End: 2019-10-03 | Stop reason: SDUPTHER

## 2019-09-29 RX ORDER — IPRATROPIUM BROMIDE AND ALBUTEROL SULFATE 2.5; .5 MG/3ML; MG/3ML
1 SOLUTION RESPIRATORY (INHALATION) EVERY 4 HOURS PRN
Status: DISCONTINUED | OUTPATIENT
Start: 2019-09-29 | End: 2019-10-03 | Stop reason: HOSPADM

## 2019-09-29 RX ORDER — ACYCLOVIR 400 MG/1
400 TABLET ORAL 2 TIMES DAILY
COMMUNITY
End: 2020-01-01

## 2019-09-29 RX ORDER — FUROSEMIDE 10 MG/ML
40 INJECTION INTRAMUSCULAR; INTRAVENOUS ONCE
Status: COMPLETED | OUTPATIENT
Start: 2019-09-29 | End: 2019-09-29

## 2019-09-29 RX ADMIN — FUROSEMIDE 40 MG: 10 INJECTION, SOLUTION INTRAMUSCULAR; INTRAVENOUS at 22:42

## 2019-09-29 ASSESSMENT — ENCOUNTER SYMPTOMS
ABDOMINAL PAIN: 0
VOMITING: 0
SHORTNESS OF BREATH: 1
BACK PAIN: 0
COUGH: 1
COLOR CHANGE: 0

## 2019-09-30 LAB
ANION GAP SERPL CALCULATED.3IONS-SCNC: 15 MMOL/L (ref 3–16)
BASOPHILS ABSOLUTE: 0 K/UL (ref 0–0.2)
BASOPHILS RELATIVE PERCENT: 0.7 %
BUN BLDV-MCNC: 30 MG/DL (ref 7–20)
CALCIUM SERPL-MCNC: 9.2 MG/DL (ref 8.3–10.6)
CHLORIDE BLD-SCNC: 98 MMOL/L (ref 99–110)
CHOLESTEROL, TOTAL: 115 MG/DL (ref 0–199)
CO2: 27 MMOL/L (ref 21–32)
CREAT SERPL-MCNC: 1.5 MG/DL (ref 0.8–1.3)
EKG ATRIAL RATE: 70 BPM
EKG DIAGNOSIS: NORMAL
EKG Q-T INTERVAL: 464 MS
EKG QRS DURATION: 176 MS
EKG QTC CALCULATION (BAZETT): 564 MS
EKG R AXIS: 263 DEGREES
EKG T AXIS: 88 DEGREES
EKG VENTRICULAR RATE: 89 BPM
EOSINOPHILS ABSOLUTE: 0 K/UL (ref 0–0.6)
EOSINOPHILS RELATIVE PERCENT: 0.6 %
GFR AFRICAN AMERICAN: 56
GFR NON-AFRICAN AMERICAN: 46
GLUCOSE BLD-MCNC: 159 MG/DL (ref 70–99)
GLUCOSE BLD-MCNC: 161 MG/DL (ref 70–99)
GLUCOSE BLD-MCNC: 173 MG/DL (ref 70–99)
GLUCOSE BLD-MCNC: 188 MG/DL (ref 70–99)
GLUCOSE BLD-MCNC: 210 MG/DL (ref 70–99)
GLUCOSE BLD-MCNC: 295 MG/DL (ref 70–99)
HCT VFR BLD CALC: 41.6 % (ref 40.5–52.5)
HDLC SERPL-MCNC: 43 MG/DL (ref 40–60)
HEMOGLOBIN: 13.4 G/DL (ref 13.5–17.5)
LDL CHOLESTEROL CALCULATED: 54 MG/DL
LYMPHOCYTES ABSOLUTE: 0.7 K/UL (ref 1–5.1)
LYMPHOCYTES RELATIVE PERCENT: 9.3 %
MAGNESIUM: 1.6 MG/DL (ref 1.8–2.4)
MCH RBC QN AUTO: 29.3 PG (ref 26–34)
MCHC RBC AUTO-ENTMCNC: 32.2 G/DL (ref 31–36)
MCV RBC AUTO: 91.1 FL (ref 80–100)
MONOCYTES ABSOLUTE: 0.7 K/UL (ref 0–1.3)
MONOCYTES RELATIVE PERCENT: 9.1 %
NEUTROPHILS ABSOLUTE: 5.8 K/UL (ref 1.7–7.7)
NEUTROPHILS RELATIVE PERCENT: 80.3 %
PDW BLD-RTO: 16.5 % (ref 12.4–15.4)
PERFORMED ON: ABNORMAL
PLATELET # BLD: 132 K/UL (ref 135–450)
PMV BLD AUTO: 11 FL (ref 5–10.5)
POTASSIUM SERPL-SCNC: 3.5 MMOL/L (ref 3.5–5.1)
PROCALCITONIN: 0.1 NG/ML (ref 0–0.15)
RBC # BLD: 4.56 M/UL (ref 4.2–5.9)
SODIUM BLD-SCNC: 140 MMOL/L (ref 136–145)
T4 FREE: 1.1 NG/DL (ref 0.9–1.8)
TRIGL SERPL-MCNC: 92 MG/DL (ref 0–150)
TROPONIN: 0.03 NG/ML
TROPONIN: 0.03 NG/ML
TSH SERPL DL<=0.05 MIU/L-ACNC: 2.64 UIU/ML (ref 0.27–4.2)
VLDLC SERPL CALC-MCNC: 18 MG/DL
WBC # BLD: 7.3 K/UL (ref 4–11)

## 2019-09-30 PROCEDURE — 84439 ASSAY OF FREE THYROXINE: CPT

## 2019-09-30 PROCEDURE — 36415 COLL VENOUS BLD VENIPUNCTURE: CPT

## 2019-09-30 PROCEDURE — 6370000000 HC RX 637 (ALT 250 FOR IP): Performed by: NURSE PRACTITIONER

## 2019-09-30 PROCEDURE — 84145 PROCALCITONIN (PCT): CPT

## 2019-09-30 PROCEDURE — 6360000002 HC RX W HCPCS: Performed by: INTERNAL MEDICINE

## 2019-09-30 PROCEDURE — 94760 N-INVAS EAR/PLS OXIMETRY 1: CPT

## 2019-09-30 PROCEDURE — 84484 ASSAY OF TROPONIN QUANT: CPT

## 2019-09-30 PROCEDURE — 84443 ASSAY THYROID STIM HORMONE: CPT

## 2019-09-30 PROCEDURE — 93010 ELECTROCARDIOGRAM REPORT: CPT | Performed by: INTERNAL MEDICINE

## 2019-09-30 PROCEDURE — 83735 ASSAY OF MAGNESIUM: CPT

## 2019-09-30 PROCEDURE — 6360000002 HC RX W HCPCS: Performed by: NURSE PRACTITIONER

## 2019-09-30 PROCEDURE — 99223 1ST HOSP IP/OBS HIGH 75: CPT | Performed by: INTERNAL MEDICINE

## 2019-09-30 PROCEDURE — 85025 COMPLETE CBC W/AUTO DIFF WBC: CPT

## 2019-09-30 PROCEDURE — 80061 LIPID PANEL: CPT

## 2019-09-30 PROCEDURE — 2580000003 HC RX 258: Performed by: NURSE PRACTITIONER

## 2019-09-30 PROCEDURE — 6370000000 HC RX 637 (ALT 250 FOR IP): Performed by: INTERNAL MEDICINE

## 2019-09-30 PROCEDURE — 80048 BASIC METABOLIC PNL TOTAL CA: CPT

## 2019-09-30 PROCEDURE — 1200000000 HC SEMI PRIVATE

## 2019-09-30 RX ORDER — POTASSIUM CHLORIDE 20 MEQ/1
40 TABLET, EXTENDED RELEASE ORAL ONCE
Status: COMPLETED | OUTPATIENT
Start: 2019-09-30 | End: 2019-09-30

## 2019-09-30 RX ORDER — RAMIPRIL 5 MG/1
10 CAPSULE ORAL DAILY
Status: DISCONTINUED | OUTPATIENT
Start: 2019-09-30 | End: 2019-09-30

## 2019-09-30 RX ORDER — DEXTROSE MONOHYDRATE 50 MG/ML
100 INJECTION, SOLUTION INTRAVENOUS PRN
Status: DISCONTINUED | OUTPATIENT
Start: 2019-09-30 | End: 2019-10-03 | Stop reason: HOSPADM

## 2019-09-30 RX ORDER — ONDANSETRON 2 MG/ML
4 INJECTION INTRAMUSCULAR; INTRAVENOUS EVERY 6 HOURS PRN
Status: DISCONTINUED | OUTPATIENT
Start: 2019-09-30 | End: 2019-10-03 | Stop reason: HOSPADM

## 2019-09-30 RX ORDER — ATORVASTATIN CALCIUM 80 MG/1
80 TABLET, FILM COATED ORAL NIGHTLY
Status: DISCONTINUED | OUTPATIENT
Start: 2019-09-30 | End: 2019-10-03 | Stop reason: HOSPADM

## 2019-09-30 RX ORDER — BRIMONIDINE TARTRATE 2 MG/ML
1 SOLUTION/ DROPS OPHTHALMIC 2 TIMES DAILY
Status: DISCONTINUED | OUTPATIENT
Start: 2019-09-30 | End: 2019-10-03 | Stop reason: HOSPADM

## 2019-09-30 RX ORDER — NICOTINE POLACRILEX 4 MG
15 LOZENGE BUCCAL PRN
Status: DISCONTINUED | OUTPATIENT
Start: 2019-09-30 | End: 2019-10-03 | Stop reason: HOSPADM

## 2019-09-30 RX ORDER — LISINOPRIL 5 MG/1
5 TABLET ORAL DAILY
Status: DISCONTINUED | OUTPATIENT
Start: 2019-09-30 | End: 2019-09-30 | Stop reason: ALTCHOICE

## 2019-09-30 RX ORDER — INSULIN GLARGINE 100 [IU]/ML
72 INJECTION, SOLUTION SUBCUTANEOUS NIGHTLY
Status: DISCONTINUED | OUTPATIENT
Start: 2019-09-30 | End: 2019-10-01

## 2019-09-30 RX ORDER — DEXTROSE MONOHYDRATE 25 G/50ML
12.5 INJECTION, SOLUTION INTRAVENOUS PRN
Status: DISCONTINUED | OUTPATIENT
Start: 2019-09-30 | End: 2019-10-03 | Stop reason: HOSPADM

## 2019-09-30 RX ORDER — FUROSEMIDE 10 MG/ML
60 INJECTION INTRAMUSCULAR; INTRAVENOUS 2 TIMES DAILY
Status: DISCONTINUED | OUTPATIENT
Start: 2019-09-30 | End: 2019-10-02

## 2019-09-30 RX ORDER — GABAPENTIN 100 MG/1
100 CAPSULE ORAL NIGHTLY
Status: DISCONTINUED | OUTPATIENT
Start: 2019-09-30 | End: 2019-10-03 | Stop reason: HOSPADM

## 2019-09-30 RX ORDER — SENNA PLUS 8.6 MG/1
1 TABLET ORAL DAILY PRN
Status: DISCONTINUED | OUTPATIENT
Start: 2019-09-30 | End: 2019-10-03 | Stop reason: HOSPADM

## 2019-09-30 RX ORDER — SODIUM CHLORIDE 0.9 % (FLUSH) 0.9 %
10 SYRINGE (ML) INJECTION PRN
Status: DISCONTINUED | OUTPATIENT
Start: 2019-09-30 | End: 2019-10-03 | Stop reason: HOSPADM

## 2019-09-30 RX ORDER — MAGNESIUM SULFATE IN WATER 40 MG/ML
2 INJECTION, SOLUTION INTRAVENOUS ONCE
Status: COMPLETED | OUTPATIENT
Start: 2019-09-30 | End: 2019-09-30

## 2019-09-30 RX ORDER — FUROSEMIDE 10 MG/ML
40 INJECTION INTRAMUSCULAR; INTRAVENOUS 2 TIMES DAILY
Status: DISCONTINUED | OUTPATIENT
Start: 2019-09-30 | End: 2019-09-30

## 2019-09-30 RX ORDER — ALPRAZOLAM 0.5 MG/1
0.5 TABLET ORAL NIGHTLY PRN
Status: DISCONTINUED | OUTPATIENT
Start: 2019-09-30 | End: 2019-10-03 | Stop reason: HOSPADM

## 2019-09-30 RX ORDER — ACYCLOVIR 800 MG/1
400 TABLET ORAL 2 TIMES DAILY
Status: DISCONTINUED | OUTPATIENT
Start: 2019-09-30 | End: 2019-10-03 | Stop reason: HOSPADM

## 2019-09-30 RX ORDER — PREDNISOLONE ACETATE 10 MG/ML
1 SUSPENSION/ DROPS OPHTHALMIC 2 TIMES DAILY
Status: DISCONTINUED | OUTPATIENT
Start: 2019-09-30 | End: 2019-10-03 | Stop reason: HOSPADM

## 2019-09-30 RX ORDER — ACETAMINOPHEN 325 MG/1
650 TABLET ORAL EVERY 4 HOURS PRN
Status: DISCONTINUED | OUTPATIENT
Start: 2019-09-30 | End: 2019-10-03 | Stop reason: HOSPADM

## 2019-09-30 RX ORDER — SPIRONOLACTONE 25 MG/1
25 TABLET ORAL DAILY
Status: DISCONTINUED | OUTPATIENT
Start: 2019-09-30 | End: 2019-10-03 | Stop reason: HOSPADM

## 2019-09-30 RX ORDER — SODIUM CHLORIDE 0.9 % (FLUSH) 0.9 %
10 SYRINGE (ML) INJECTION EVERY 12 HOURS SCHEDULED
Status: DISCONTINUED | OUTPATIENT
Start: 2019-09-30 | End: 2019-10-03 | Stop reason: HOSPADM

## 2019-09-30 RX ORDER — METOPROLOL SUCCINATE 50 MG/1
100 TABLET, EXTENDED RELEASE ORAL DAILY
Status: DISCONTINUED | OUTPATIENT
Start: 2019-09-30 | End: 2019-10-03 | Stop reason: HOSPADM

## 2019-09-30 RX ORDER — VALSARTAN 80 MG/1
80 TABLET ORAL DAILY
Status: DISCONTINUED | OUTPATIENT
Start: 2019-10-01 | End: 2019-10-02

## 2019-09-30 RX ORDER — ALBUTEROL SULFATE 90 UG/1
2 AEROSOL, METERED RESPIRATORY (INHALATION) EVERY 6 HOURS PRN
Status: DISCONTINUED | OUTPATIENT
Start: 2019-09-30 | End: 2019-10-03 | Stop reason: HOSPADM

## 2019-09-30 RX ORDER — TORSEMIDE 20 MG/1
TABLET ORAL
Qty: 180 TABLET | Refills: 2 | Status: SHIPPED | OUTPATIENT
Start: 2019-09-30 | End: 2019-11-14

## 2019-09-30 RX ADMIN — PREDNISOLONE ACETATE 1 DROP: 10 SUSPENSION/ DROPS OPHTHALMIC at 22:13

## 2019-09-30 RX ADMIN — GABAPENTIN 100 MG: 100 CAPSULE ORAL at 22:13

## 2019-09-30 RX ADMIN — ACYCLOVIR 800 MG: 800 TABLET ORAL at 08:23

## 2019-09-30 RX ADMIN — CEFTRIAXONE 1 G: 1 INJECTION, POWDER, FOR SOLUTION INTRAMUSCULAR; INTRAVENOUS at 01:20

## 2019-09-30 RX ADMIN — ALPRAZOLAM 0.5 MG: 0.5 TABLET ORAL at 22:12

## 2019-09-30 RX ADMIN — INSULIN GLARGINE 72 UNITS: 100 INJECTION, SOLUTION SUBCUTANEOUS at 22:16

## 2019-09-30 RX ADMIN — FUROSEMIDE 60 MG: 10 INJECTION, SOLUTION INTRAMUSCULAR; INTRAVENOUS at 18:00

## 2019-09-30 RX ADMIN — POLYETHYLENE GLYCOL 400 AND PROPYLENE GLYCOL 1 DROP: 4; 3 SOLUTION/ DROPS OPHTHALMIC at 22:13

## 2019-09-30 RX ADMIN — INSULIN LISPRO 1 UNITS: 100 INJECTION, SOLUTION INTRAVENOUS; SUBCUTANEOUS at 22:16

## 2019-09-30 RX ADMIN — INSULIN LISPRO 20 UNITS: 100 INJECTION, SOLUTION INTRAVENOUS; SUBCUTANEOUS at 18:00

## 2019-09-30 RX ADMIN — APIXABAN 5 MG: 5 TABLET, FILM COATED ORAL at 08:24

## 2019-09-30 RX ADMIN — METOPROLOL SUCCINATE 100 MG: 50 TABLET, EXTENDED RELEASE ORAL at 08:24

## 2019-09-30 RX ADMIN — POTASSIUM CHLORIDE 40 MEQ: 1500 TABLET, EXTENDED RELEASE ORAL at 11:07

## 2019-09-30 RX ADMIN — PREDNISOLONE ACETATE 1 DROP: 10 SUSPENSION/ DROPS OPHTHALMIC at 10:05

## 2019-09-30 RX ADMIN — MAGNESIUM SULFATE HEPTAHYDRATE 2 G: 40 INJECTION, SOLUTION INTRAVENOUS at 10:04

## 2019-09-30 RX ADMIN — INSULIN LISPRO 2 UNITS: 100 INJECTION, SOLUTION INTRAVENOUS; SUBCUTANEOUS at 12:27

## 2019-09-30 RX ADMIN — INSULIN LISPRO 2 UNITS: 100 INJECTION, SOLUTION INTRAVENOUS; SUBCUTANEOUS at 18:00

## 2019-09-30 RX ADMIN — FUROSEMIDE 40 MG: 10 INJECTION, SOLUTION INTRAMUSCULAR; INTRAVENOUS at 08:31

## 2019-09-30 RX ADMIN — AZITHROMYCIN MONOHYDRATE 500 MG: 500 INJECTION, POWDER, LYOPHILIZED, FOR SOLUTION INTRAVENOUS at 02:06

## 2019-09-30 RX ADMIN — BRIMONIDINE TARTRATE 1 DROP: 2 SOLUTION OPHTHALMIC at 22:13

## 2019-09-30 RX ADMIN — SPIRONOLACTONE 25 MG: 25 TABLET ORAL at 08:40

## 2019-09-30 RX ADMIN — BRIMONIDINE TARTRATE 1 DROP: 2 SOLUTION OPHTHALMIC at 10:05

## 2019-09-30 RX ADMIN — INSULIN LISPRO 3 UNITS: 100 INJECTION, SOLUTION INTRAVENOUS; SUBCUTANEOUS at 01:12

## 2019-09-30 RX ADMIN — ACYCLOVIR 400 MG: 800 TABLET ORAL at 22:13

## 2019-09-30 RX ADMIN — Medication 10 ML: at 08:41

## 2019-09-30 RX ADMIN — RAMIPRIL 10 MG: 5 CAPSULE ORAL at 08:22

## 2019-09-30 RX ADMIN — APIXABAN 5 MG: 5 TABLET, FILM COATED ORAL at 22:12

## 2019-09-30 RX ADMIN — ALPRAZOLAM 0.5 MG: 0.5 TABLET ORAL at 01:21

## 2019-09-30 RX ADMIN — ATORVASTATIN CALCIUM 80 MG: 80 TABLET, FILM COATED ORAL at 22:13

## 2019-09-30 RX ADMIN — Medication 10 ML: at 22:19

## 2019-09-30 RX ADMIN — INSULIN GLARGINE 72 UNITS: 100 INJECTION, SOLUTION SUBCUTANEOUS at 01:12

## 2019-09-30 RX ADMIN — INSULIN LISPRO 2 UNITS: 100 INJECTION, SOLUTION INTRAVENOUS; SUBCUTANEOUS at 08:39

## 2019-09-30 ASSESSMENT — PAIN SCALES - GENERAL
PAINLEVEL_OUTOF10: 0

## 2019-10-01 LAB
ANION GAP SERPL CALCULATED.3IONS-SCNC: 13 MMOL/L (ref 3–16)
BUN BLDV-MCNC: 35 MG/DL (ref 7–20)
CALCIUM SERPL-MCNC: 9.1 MG/DL (ref 8.3–10.6)
CHLORIDE BLD-SCNC: 106 MMOL/L (ref 99–110)
CO2: 25 MMOL/L (ref 21–32)
CREAT SERPL-MCNC: 1.5 MG/DL (ref 0.8–1.3)
GFR AFRICAN AMERICAN: 56
GFR NON-AFRICAN AMERICAN: 46
GLUCOSE BLD-MCNC: 141 MG/DL (ref 70–99)
GLUCOSE BLD-MCNC: 147 MG/DL (ref 70–99)
GLUCOSE BLD-MCNC: 157 MG/DL (ref 70–99)
GLUCOSE BLD-MCNC: 164 MG/DL (ref 70–99)
GLUCOSE BLD-MCNC: 224 MG/DL (ref 70–99)
GLUCOSE BLD-MCNC: 55 MG/DL (ref 70–99)
GLUCOSE BLD-MCNC: 60 MG/DL (ref 70–99)
GLUCOSE BLD-MCNC: 83 MG/DL (ref 70–99)
LV EF: 30 %
LVEF MODALITY: NORMAL
MAGNESIUM: 2 MG/DL (ref 1.8–2.4)
PERFORMED ON: ABNORMAL
PERFORMED ON: NORMAL
POTASSIUM SERPL-SCNC: 3.8 MMOL/L (ref 3.5–5.1)
SODIUM BLD-SCNC: 144 MMOL/L (ref 136–145)

## 2019-10-01 PROCEDURE — 83735 ASSAY OF MAGNESIUM: CPT

## 2019-10-01 PROCEDURE — 2580000003 HC RX 258: Performed by: NURSE PRACTITIONER

## 2019-10-01 PROCEDURE — 94760 N-INVAS EAR/PLS OXIMETRY 1: CPT

## 2019-10-01 PROCEDURE — 80048 BASIC METABOLIC PNL TOTAL CA: CPT

## 2019-10-01 PROCEDURE — 6370000000 HC RX 637 (ALT 250 FOR IP): Performed by: INTERNAL MEDICINE

## 2019-10-01 PROCEDURE — 6360000002 HC RX W HCPCS: Performed by: INTERNAL MEDICINE

## 2019-10-01 PROCEDURE — 6370000000 HC RX 637 (ALT 250 FOR IP): Performed by: NURSE PRACTITIONER

## 2019-10-01 PROCEDURE — 93306 TTE W/DOPPLER COMPLETE: CPT

## 2019-10-01 PROCEDURE — 1200000000 HC SEMI PRIVATE

## 2019-10-01 PROCEDURE — 99232 SBSQ HOSP IP/OBS MODERATE 35: CPT | Performed by: NURSE PRACTITIONER

## 2019-10-01 PROCEDURE — 36415 COLL VENOUS BLD VENIPUNCTURE: CPT

## 2019-10-01 RX ORDER — ATORVASTATIN CALCIUM 80 MG/1
TABLET, FILM COATED ORAL
Qty: 90 TABLET | Refills: 1 | Status: SHIPPED | OUTPATIENT
Start: 2019-10-01 | End: 2020-01-01 | Stop reason: SDUPTHER

## 2019-10-01 RX ORDER — INSULIN GLARGINE 100 [IU]/ML
36 INJECTION, SOLUTION SUBCUTANEOUS NIGHTLY
Status: DISCONTINUED | OUTPATIENT
Start: 2019-10-01 | End: 2019-10-02

## 2019-10-01 RX ADMIN — ATORVASTATIN CALCIUM 80 MG: 80 TABLET, FILM COATED ORAL at 21:36

## 2019-10-01 RX ADMIN — GABAPENTIN 100 MG: 100 CAPSULE ORAL at 21:36

## 2019-10-01 RX ADMIN — INSULIN LISPRO 2 UNITS: 100 INJECTION, SOLUTION INTRAVENOUS; SUBCUTANEOUS at 09:39

## 2019-10-01 RX ADMIN — ACYCLOVIR 400 MG: 800 TABLET ORAL at 21:35

## 2019-10-01 RX ADMIN — VALSARTAN 80 MG: 80 TABLET, FILM COATED ORAL at 09:37

## 2019-10-01 RX ADMIN — INSULIN LISPRO 4 UNITS: 100 INJECTION, SOLUTION INTRAVENOUS; SUBCUTANEOUS at 17:58

## 2019-10-01 RX ADMIN — METOPROLOL SUCCINATE 100 MG: 50 TABLET, EXTENDED RELEASE ORAL at 09:37

## 2019-10-01 RX ADMIN — INSULIN GLARGINE 36 UNITS: 100 INJECTION, SOLUTION SUBCUTANEOUS at 21:41

## 2019-10-01 RX ADMIN — ACYCLOVIR 400 MG: 800 TABLET ORAL at 09:36

## 2019-10-01 RX ADMIN — INSULIN LISPRO 20 UNITS: 100 INJECTION, SOLUTION INTRAVENOUS; SUBCUTANEOUS at 09:37

## 2019-10-01 RX ADMIN — ALPRAZOLAM 0.5 MG: 0.5 TABLET ORAL at 21:36

## 2019-10-01 RX ADMIN — FUROSEMIDE 60 MG: 10 INJECTION, SOLUTION INTRAMUSCULAR; INTRAVENOUS at 09:37

## 2019-10-01 RX ADMIN — PREDNISOLONE ACETATE 1 DROP: 10 SUSPENSION/ DROPS OPHTHALMIC at 21:36

## 2019-10-01 RX ADMIN — APIXABAN 5 MG: 5 TABLET, FILM COATED ORAL at 09:36

## 2019-10-01 RX ADMIN — FUROSEMIDE 60 MG: 10 INJECTION, SOLUTION INTRAMUSCULAR; INTRAVENOUS at 17:58

## 2019-10-01 RX ADMIN — INSULIN LISPRO 1 UNITS: 100 INJECTION, SOLUTION INTRAVENOUS; SUBCUTANEOUS at 21:40

## 2019-10-01 RX ADMIN — INSULIN LISPRO 20 UNITS: 100 INJECTION, SOLUTION INTRAVENOUS; SUBCUTANEOUS at 17:58

## 2019-10-01 RX ADMIN — BRIMONIDINE TARTRATE 1 DROP: 2 SOLUTION OPHTHALMIC at 21:37

## 2019-10-01 RX ADMIN — BRIMONIDINE TARTRATE 1 DROP: 2 SOLUTION OPHTHALMIC at 09:37

## 2019-10-01 RX ADMIN — SPIRONOLACTONE 25 MG: 25 TABLET ORAL at 09:37

## 2019-10-01 RX ADMIN — POLYETHYLENE GLYCOL 400 AND PROPYLENE GLYCOL 1 DROP: 4; 3 SOLUTION/ DROPS OPHTHALMIC at 21:36

## 2019-10-01 RX ADMIN — INSULIN LISPRO 2 UNITS: 100 INJECTION, SOLUTION INTRAVENOUS; SUBCUTANEOUS at 14:10

## 2019-10-01 RX ADMIN — PREDNISOLONE ACETATE 1 DROP: 10 SUSPENSION/ DROPS OPHTHALMIC at 09:37

## 2019-10-01 RX ADMIN — APIXABAN 5 MG: 5 TABLET, FILM COATED ORAL at 21:36

## 2019-10-01 RX ADMIN — Medication 10 ML: at 09:40

## 2019-10-01 RX ADMIN — Medication 10 ML: at 17:58

## 2019-10-01 RX ADMIN — Medication 10 ML: at 21:46

## 2019-10-01 ASSESSMENT — PAIN SCALES - GENERAL
PAINLEVEL_OUTOF10: 0
PAINLEVEL_OUTOF10: 0

## 2019-10-02 LAB
ANION GAP SERPL CALCULATED.3IONS-SCNC: 14 MMOL/L (ref 3–16)
BUN BLDV-MCNC: 37 MG/DL (ref 7–20)
CALCIUM SERPL-MCNC: 9.4 MG/DL (ref 8.3–10.6)
CHLORIDE BLD-SCNC: 98 MMOL/L (ref 99–110)
CO2: 29 MMOL/L (ref 21–32)
CREAT SERPL-MCNC: 1.4 MG/DL (ref 0.8–1.3)
GFR AFRICAN AMERICAN: >60
GFR NON-AFRICAN AMERICAN: 50
GLUCOSE BLD-MCNC: 106 MG/DL (ref 70–99)
GLUCOSE BLD-MCNC: 113 MG/DL (ref 70–99)
GLUCOSE BLD-MCNC: 160 MG/DL (ref 70–99)
GLUCOSE BLD-MCNC: 255 MG/DL (ref 70–99)
GLUCOSE BLD-MCNC: 270 MG/DL (ref 70–99)
GLUCOSE BLD-MCNC: 289 MG/DL (ref 70–99)
GLUCOSE BLD-MCNC: 65 MG/DL (ref 70–99)
GLUCOSE BLD-MCNC: 68 MG/DL (ref 70–99)
GLUCOSE BLD-MCNC: 70 MG/DL (ref 70–99)
MAGNESIUM: 2 MG/DL (ref 1.8–2.4)
PERFORMED ON: ABNORMAL
PERFORMED ON: NORMAL
POTASSIUM SERPL-SCNC: 4 MMOL/L (ref 3.5–5.1)
SODIUM BLD-SCNC: 141 MMOL/L (ref 136–145)

## 2019-10-02 PROCEDURE — 6370000000 HC RX 637 (ALT 250 FOR IP): Performed by: INTERNAL MEDICINE

## 2019-10-02 PROCEDURE — 99233 SBSQ HOSP IP/OBS HIGH 50: CPT | Performed by: INTERNAL MEDICINE

## 2019-10-02 PROCEDURE — 1200000000 HC SEMI PRIVATE

## 2019-10-02 PROCEDURE — 36415 COLL VENOUS BLD VENIPUNCTURE: CPT

## 2019-10-02 PROCEDURE — 83735 ASSAY OF MAGNESIUM: CPT

## 2019-10-02 PROCEDURE — 6360000002 HC RX W HCPCS: Performed by: INTERNAL MEDICINE

## 2019-10-02 PROCEDURE — 94760 N-INVAS EAR/PLS OXIMETRY 1: CPT

## 2019-10-02 PROCEDURE — 80048 BASIC METABOLIC PNL TOTAL CA: CPT

## 2019-10-02 PROCEDURE — 6370000000 HC RX 637 (ALT 250 FOR IP): Performed by: NURSE PRACTITIONER

## 2019-10-02 PROCEDURE — 2580000003 HC RX 258: Performed by: NURSE PRACTITIONER

## 2019-10-02 RX ORDER — INSULIN GLARGINE 100 [IU]/ML
30 INJECTION, SOLUTION SUBCUTANEOUS NIGHTLY
Status: DISCONTINUED | OUTPATIENT
Start: 2019-10-02 | End: 2019-10-03 | Stop reason: HOSPADM

## 2019-10-02 RX ADMIN — ACYCLOVIR 400 MG: 800 TABLET ORAL at 20:17

## 2019-10-02 RX ADMIN — ACYCLOVIR 400 MG: 800 TABLET ORAL at 08:22

## 2019-10-02 RX ADMIN — Medication 10 ML: at 08:22

## 2019-10-02 RX ADMIN — INSULIN LISPRO 10 UNITS: 100 INJECTION, SOLUTION INTRAVENOUS; SUBCUTANEOUS at 17:04

## 2019-10-02 RX ADMIN — PREDNISOLONE ACETATE 1 DROP: 10 SUSPENSION/ DROPS OPHTHALMIC at 20:20

## 2019-10-02 RX ADMIN — INSULIN GLARGINE 30 UNITS: 100 INJECTION, SOLUTION SUBCUTANEOUS at 20:33

## 2019-10-02 RX ADMIN — GABAPENTIN 100 MG: 100 CAPSULE ORAL at 20:18

## 2019-10-02 RX ADMIN — SPIRONOLACTONE 25 MG: 25 TABLET ORAL at 08:22

## 2019-10-02 RX ADMIN — ALPRAZOLAM 0.5 MG: 0.5 TABLET ORAL at 20:32

## 2019-10-02 RX ADMIN — INSULIN LISPRO 3 UNITS: 100 INJECTION, SOLUTION INTRAVENOUS; SUBCUTANEOUS at 20:32

## 2019-10-02 RX ADMIN — APIXABAN 5 MG: 5 TABLET, FILM COATED ORAL at 08:22

## 2019-10-02 RX ADMIN — SACUBITRIL AND VALSARTAN 0.5 TABLET: 24; 26 TABLET, FILM COATED ORAL at 17:04

## 2019-10-02 RX ADMIN — POLYETHYLENE GLYCOL 400 AND PROPYLENE GLYCOL 1 DROP: 4; 3 SOLUTION/ DROPS OPHTHALMIC at 20:20

## 2019-10-02 RX ADMIN — METOPROLOL SUCCINATE 100 MG: 50 TABLET, EXTENDED RELEASE ORAL at 08:22

## 2019-10-02 RX ADMIN — Medication 10 ML: at 20:21

## 2019-10-02 RX ADMIN — INSULIN LISPRO 6 UNITS: 100 INJECTION, SOLUTION INTRAVENOUS; SUBCUTANEOUS at 17:04

## 2019-10-02 RX ADMIN — BRIMONIDINE TARTRATE 1 DROP: 2 SOLUTION OPHTHALMIC at 20:20

## 2019-10-02 RX ADMIN — INSULIN LISPRO 6 UNITS: 100 INJECTION, SOLUTION INTRAVENOUS; SUBCUTANEOUS at 12:45

## 2019-10-02 RX ADMIN — VALSARTAN 80 MG: 80 TABLET, FILM COATED ORAL at 08:22

## 2019-10-02 RX ADMIN — APIXABAN 5 MG: 5 TABLET, FILM COATED ORAL at 20:19

## 2019-10-02 RX ADMIN — ATORVASTATIN CALCIUM 80 MG: 80 TABLET, FILM COATED ORAL at 20:16

## 2019-10-02 RX ADMIN — BRIMONIDINE TARTRATE 1 DROP: 2 SOLUTION OPHTHALMIC at 08:23

## 2019-10-02 RX ADMIN — PREDNISOLONE ACETATE 1 DROP: 10 SUSPENSION/ DROPS OPHTHALMIC at 08:23

## 2019-10-02 RX ADMIN — FUROSEMIDE 60 MG: 10 INJECTION, SOLUTION INTRAMUSCULAR; INTRAVENOUS at 08:23

## 2019-10-02 ASSESSMENT — PAIN SCALES - GENERAL
PAINLEVEL_OUTOF10: 0
PAINLEVEL_OUTOF10: 0

## 2019-10-03 VITALS
SYSTOLIC BLOOD PRESSURE: 107 MMHG | DIASTOLIC BLOOD PRESSURE: 72 MMHG | RESPIRATION RATE: 18 BRPM | HEIGHT: 68 IN | HEART RATE: 89 BPM | BODY MASS INDEX: 36.39 KG/M2 | WEIGHT: 240.08 LBS | TEMPERATURE: 97.6 F | OXYGEN SATURATION: 95 %

## 2019-10-03 LAB
ANION GAP SERPL CALCULATED.3IONS-SCNC: 12 MMOL/L (ref 3–16)
BUN BLDV-MCNC: 36 MG/DL (ref 7–20)
CALCIUM SERPL-MCNC: 9.1 MG/DL (ref 8.3–10.6)
CHLORIDE BLD-SCNC: 101 MMOL/L (ref 99–110)
CO2: 28 MMOL/L (ref 21–32)
CREAT SERPL-MCNC: 1.4 MG/DL (ref 0.8–1.3)
GFR AFRICAN AMERICAN: >60
GFR NON-AFRICAN AMERICAN: 50
GLUCOSE BLD-MCNC: 120 MG/DL (ref 70–99)
GLUCOSE BLD-MCNC: 130 MG/DL (ref 70–99)
GLUCOSE BLD-MCNC: 278 MG/DL (ref 70–99)
MAGNESIUM: 2.1 MG/DL (ref 1.8–2.4)
PERFORMED ON: ABNORMAL
PERFORMED ON: ABNORMAL
POTASSIUM SERPL-SCNC: 4.4 MMOL/L (ref 3.5–5.1)
SODIUM BLD-SCNC: 141 MMOL/L (ref 136–145)

## 2019-10-03 PROCEDURE — 6370000000 HC RX 637 (ALT 250 FOR IP): Performed by: INTERNAL MEDICINE

## 2019-10-03 PROCEDURE — 83735 ASSAY OF MAGNESIUM: CPT

## 2019-10-03 PROCEDURE — 80048 BASIC METABOLIC PNL TOTAL CA: CPT

## 2019-10-03 PROCEDURE — 2580000003 HC RX 258: Performed by: NURSE PRACTITIONER

## 2019-10-03 PROCEDURE — 99233 SBSQ HOSP IP/OBS HIGH 50: CPT | Performed by: INTERNAL MEDICINE

## 2019-10-03 PROCEDURE — 6370000000 HC RX 637 (ALT 250 FOR IP): Performed by: NURSE PRACTITIONER

## 2019-10-03 PROCEDURE — 36415 COLL VENOUS BLD VENIPUNCTURE: CPT

## 2019-10-03 RX ORDER — INSULIN GLARGINE 100 [IU]/ML
30 INJECTION, SOLUTION SUBCUTANEOUS NIGHTLY
Qty: 1 VIAL | Refills: 3 | Status: SHIPPED | OUTPATIENT
Start: 2019-10-03 | End: 2020-01-01

## 2019-10-03 RX ADMIN — INSULIN LISPRO 10 UNITS: 100 INJECTION, SOLUTION INTRAVENOUS; SUBCUTANEOUS at 08:17

## 2019-10-03 RX ADMIN — PREDNISOLONE ACETATE 1 DROP: 10 SUSPENSION/ DROPS OPHTHALMIC at 08:20

## 2019-10-03 RX ADMIN — METOPROLOL SUCCINATE 100 MG: 50 TABLET, EXTENDED RELEASE ORAL at 08:18

## 2019-10-03 RX ADMIN — BRIMONIDINE TARTRATE 1 DROP: 2 SOLUTION OPHTHALMIC at 08:20

## 2019-10-03 RX ADMIN — SACUBITRIL AND VALSARTAN 0.5 TABLET: 24; 26 TABLET, FILM COATED ORAL at 08:18

## 2019-10-03 RX ADMIN — APIXABAN 5 MG: 5 TABLET, FILM COATED ORAL at 08:18

## 2019-10-03 RX ADMIN — ACYCLOVIR 400 MG: 800 TABLET ORAL at 08:18

## 2019-10-03 RX ADMIN — INSULIN LISPRO 4 UNITS: 100 INJECTION, SOLUTION INTRAVENOUS; SUBCUTANEOUS at 12:18

## 2019-10-03 RX ADMIN — SPIRONOLACTONE 25 MG: 25 TABLET ORAL at 08:17

## 2019-10-03 RX ADMIN — Medication 10 ML: at 08:20

## 2019-10-03 ASSESSMENT — PAIN SCALES - GENERAL: PAINLEVEL_OUTOF10: 0

## 2019-10-04 ENCOUNTER — TELEPHONE (OUTPATIENT)
Dept: CARDIOLOGY CLINIC | Age: 74
End: 2019-10-04

## 2019-10-04 ENCOUNTER — CARE COORDINATION (OUTPATIENT)
Dept: CASE MANAGEMENT | Age: 74
End: 2019-10-04

## 2019-10-04 DIAGNOSIS — N18.30 STAGE 3 CHRONIC KIDNEY DISEASE (HCC): Primary | ICD-10-CM

## 2019-10-04 PROCEDURE — 1111F DSCHRG MED/CURRENT MED MERGE: CPT | Performed by: FAMILY MEDICINE

## 2019-10-07 ENCOUNTER — SCHEDULED TELEPHONE ENCOUNTER (OUTPATIENT)
Dept: PHARMACY | Age: 74
End: 2019-10-07

## 2019-10-08 ENCOUNTER — NURSE ONLY (OUTPATIENT)
Dept: CARDIOLOGY CLINIC | Age: 74
End: 2019-10-08
Payer: MEDICARE

## 2019-10-08 DIAGNOSIS — I25.5 ISCHEMIC CARDIOMYOPATHY: ICD-10-CM

## 2019-10-08 DIAGNOSIS — Z95.810 AUTOMATIC IMPLANTABLE CARDIOVERTER-DEFIBRILLATOR IN SITU: ICD-10-CM

## 2019-10-08 DIAGNOSIS — I50.23 ACUTE ON CHRONIC SYSTOLIC CONGESTIVE HEART FAILURE (HCC): ICD-10-CM

## 2019-10-08 PROCEDURE — 93296 REM INTERROG EVL PM/IDS: CPT | Performed by: INTERNAL MEDICINE

## 2019-10-08 PROCEDURE — 93297 REM INTERROG DEV EVAL ICPMS: CPT | Performed by: INTERNAL MEDICINE

## 2019-10-08 PROCEDURE — 93295 DEV INTERROG REMOTE 1/2/MLT: CPT | Performed by: INTERNAL MEDICINE

## 2019-10-09 ENCOUNTER — TELEPHONE (OUTPATIENT)
Dept: FAMILY MEDICINE CLINIC | Age: 74
End: 2019-10-09

## 2019-10-09 ENCOUNTER — CARE COORDINATION (OUTPATIENT)
Dept: CASE MANAGEMENT | Age: 74
End: 2019-10-09

## 2019-10-09 NOTE — TELEPHONE ENCOUNTER
Pt was just released from hospital today for heart failure,  He is asking if you can look over all his blood results and tell him what he still needs to have drawn.       Please advise, [phone]

## 2019-10-10 ENCOUNTER — OFFICE VISIT (OUTPATIENT)
Dept: CARDIOLOGY CLINIC | Age: 74
End: 2019-10-10
Payer: MEDICARE

## 2019-10-10 VITALS
BODY MASS INDEX: 34.07 KG/M2 | WEIGHT: 224.8 LBS | HEIGHT: 68 IN | HEART RATE: 92 BPM | SYSTOLIC BLOOD PRESSURE: 134 MMHG | DIASTOLIC BLOOD PRESSURE: 70 MMHG | OXYGEN SATURATION: 96 %

## 2019-10-10 DIAGNOSIS — I50.23 ACUTE ON CHRONIC SYSTOLIC HEART FAILURE (HCC): ICD-10-CM

## 2019-10-10 DIAGNOSIS — I48.20 CHRONIC ATRIAL FIBRILLATION (HCC): Primary | ICD-10-CM

## 2019-10-10 PROCEDURE — 99214 OFFICE O/P EST MOD 30 MIN: CPT | Performed by: NURSE PRACTITIONER

## 2019-10-10 PROCEDURE — 93000 ELECTROCARDIOGRAM COMPLETE: CPT | Performed by: NURSE PRACTITIONER

## 2019-10-11 ENCOUNTER — SCHEDULED TELEPHONE ENCOUNTER (OUTPATIENT)
Dept: PHARMACY | Age: 74
End: 2019-10-11

## 2019-10-11 DIAGNOSIS — I50.23 ACUTE ON CHRONIC SYSTOLIC HEART FAILURE (HCC): ICD-10-CM

## 2019-10-11 DIAGNOSIS — E11.00 UNCONTROLLED TYPE 2 DIABETES MELLITUS WITH HYPEROSMOLARITY WITHOUT COMA, WITH LONG-TERM CURRENT USE OF INSULIN (HCC): ICD-10-CM

## 2019-10-11 DIAGNOSIS — Z79.4 UNCONTROLLED TYPE 2 DIABETES MELLITUS WITH HYPEROSMOLARITY WITHOUT COMA, WITH LONG-TERM CURRENT USE OF INSULIN (HCC): ICD-10-CM

## 2019-10-11 DIAGNOSIS — N17.9 ACUTE KIDNEY INJURY SUPERIMPOSED ON CHRONIC KIDNEY DISEASE (HCC): ICD-10-CM

## 2019-10-11 DIAGNOSIS — Z12.5 PROSTATE CANCER SCREENING: ICD-10-CM

## 2019-10-11 DIAGNOSIS — N18.9 ACUTE KIDNEY INJURY SUPERIMPOSED ON CHRONIC KIDNEY DISEASE (HCC): ICD-10-CM

## 2019-10-11 LAB
CREATININE URINE: 139.2 MG/DL (ref 39–259)
MICROALBUMIN UR-MCNC: 10.3 MG/DL
MICROALBUMIN/CREAT UR-RTO: 74 MG/G (ref 0–30)
REASON FOR REJECTION: NORMAL
REJECTED TEST: NORMAL

## 2019-10-12 LAB
ESTIMATED AVERAGE GLUCOSE: 185.8 MG/DL
HBA1C MFR BLD: 8.1 %

## 2019-10-14 ENCOUNTER — TELEPHONE (OUTPATIENT)
Dept: CARDIOLOGY CLINIC | Age: 74
End: 2019-10-14

## 2019-10-15 ENCOUNTER — OFFICE VISIT (OUTPATIENT)
Dept: FAMILY MEDICINE CLINIC | Age: 74
End: 2019-10-15
Payer: MEDICARE

## 2019-10-15 VITALS
SYSTOLIC BLOOD PRESSURE: 124 MMHG | HEIGHT: 68 IN | WEIGHT: 222.4 LBS | BODY MASS INDEX: 33.71 KG/M2 | TEMPERATURE: 97.5 F | DIASTOLIC BLOOD PRESSURE: 82 MMHG

## 2019-10-15 DIAGNOSIS — E11.00 UNCONTROLLED TYPE 2 DIABETES MELLITUS WITH HYPEROSMOLARITY WITHOUT COMA, WITH LONG-TERM CURRENT USE OF INSULIN (HCC): Primary | ICD-10-CM

## 2019-10-15 DIAGNOSIS — Z79.4 UNCONTROLLED TYPE 2 DIABETES MELLITUS WITH HYPEROSMOLARITY WITHOUT COMA, WITH LONG-TERM CURRENT USE OF INSULIN (HCC): Primary | ICD-10-CM

## 2019-10-15 LAB
A/G RATIO: 1.1 (ref 1.1–2.2)
ALBUMIN SERPL-MCNC: 4 G/DL (ref 3.4–5)
ALP BLD-CCNC: 83 U/L (ref 40–129)
ALT SERPL-CCNC: 27 U/L (ref 10–40)
ANION GAP SERPL CALCULATED.3IONS-SCNC: 19 MMOL/L (ref 3–16)
AST SERPL-CCNC: 27 U/L (ref 15–37)
BILIRUB SERPL-MCNC: 0.7 MG/DL (ref 0–1)
BUN BLDV-MCNC: 45 MG/DL (ref 7–20)
CALCIUM SERPL-MCNC: 9.5 MG/DL (ref 8.3–10.6)
CHLORIDE BLD-SCNC: 98 MMOL/L (ref 99–110)
CHOLESTEROL, TOTAL: 95 MG/DL (ref 0–199)
CO2: 18 MMOL/L (ref 21–32)
CREAT SERPL-MCNC: 1.5 MG/DL (ref 0.8–1.3)
GFR AFRICAN AMERICAN: 56
GFR NON-AFRICAN AMERICAN: 46
GLOBULIN: 3.6 G/DL
GLUCOSE BLD-MCNC: 291 MG/DL (ref 70–99)
HDLC SERPL-MCNC: 42 MG/DL (ref 40–60)
LDL CHOLESTEROL CALCULATED: 27 MG/DL
POTASSIUM SERPL-SCNC: 4.6 MMOL/L (ref 3.5–5.1)
PROSTATE SPECIFIC ANTIGEN: 0.28 NG/ML (ref 0–4)
SODIUM BLD-SCNC: 135 MMOL/L (ref 136–145)
TOTAL PROTEIN: 7.6 G/DL (ref 6.4–8.2)
TRIGL SERPL-MCNC: 130 MG/DL (ref 0–150)
VLDLC SERPL CALC-MCNC: 26 MG/DL

## 2019-10-15 PROCEDURE — 99213 OFFICE O/P EST LOW 20 MIN: CPT | Performed by: FAMILY MEDICINE

## 2019-10-20 ASSESSMENT — ENCOUNTER SYMPTOMS: SHORTNESS OF BREATH: 0

## 2019-10-21 ENCOUNTER — CARE COORDINATION (OUTPATIENT)
Dept: CASE MANAGEMENT | Age: 74
End: 2019-10-21

## 2019-11-05 ENCOUNTER — TELEPHONE (OUTPATIENT)
Dept: CARDIOLOGY CLINIC | Age: 74
End: 2019-11-05

## 2019-11-05 RX ORDER — METOPROLOL SUCCINATE 100 MG/1
TABLET, EXTENDED RELEASE ORAL
Status: CANCELLED | OUTPATIENT
Start: 2019-11-05

## 2019-11-08 ENCOUNTER — TELEPHONE (OUTPATIENT)
Dept: FAMILY MEDICINE CLINIC | Age: 74
End: 2019-11-08

## 2019-11-08 RX ORDER — SPIRONOLACTONE 25 MG/1
TABLET ORAL
Qty: 90 TABLET | Refills: 1 | Status: SHIPPED | OUTPATIENT
Start: 2019-11-08 | End: 2019-11-14 | Stop reason: SDUPTHER

## 2019-11-13 ENCOUNTER — TELEPHONE (OUTPATIENT)
Dept: FAMILY MEDICINE CLINIC | Age: 74
End: 2019-11-13

## 2019-11-14 ENCOUNTER — OFFICE VISIT (OUTPATIENT)
Dept: CARDIOLOGY CLINIC | Age: 74
End: 2019-11-14
Payer: MEDICARE

## 2019-11-14 VITALS
OXYGEN SATURATION: 98 % | SYSTOLIC BLOOD PRESSURE: 118 MMHG | HEART RATE: 98 BPM | BODY MASS INDEX: 33.65 KG/M2 | HEIGHT: 68 IN | DIASTOLIC BLOOD PRESSURE: 70 MMHG | WEIGHT: 222 LBS

## 2019-11-14 DIAGNOSIS — I50.22 CHRONIC SYSTOLIC HEART FAILURE (HCC): Primary | ICD-10-CM

## 2019-11-14 DIAGNOSIS — I48.20 CHRONIC ATRIAL FIBRILLATION (HCC): ICD-10-CM

## 2019-11-14 DIAGNOSIS — I10 ESSENTIAL HYPERTENSION: ICD-10-CM

## 2019-11-14 DIAGNOSIS — I25.10 CORONARY ARTERY DISEASE INVOLVING NATIVE CORONARY ARTERY OF NATIVE HEART WITHOUT ANGINA PECTORIS: ICD-10-CM

## 2019-11-14 PROCEDURE — 99214 OFFICE O/P EST MOD 30 MIN: CPT | Performed by: NURSE PRACTITIONER

## 2019-11-14 RX ORDER — SPIRONOLACTONE 25 MG/1
TABLET ORAL
Qty: 90 TABLET | Refills: 2 | Status: SHIPPED | OUTPATIENT
Start: 2019-11-14 | End: 2020-01-01 | Stop reason: SDUPTHER

## 2019-11-14 RX ORDER — TORSEMIDE 20 MG/1
20 TABLET ORAL DAILY
Qty: 90 TABLET | Refills: 3 | Status: SHIPPED | OUTPATIENT
Start: 2019-11-14 | End: 2020-01-01 | Stop reason: SDUPTHER

## 2019-11-19 ENCOUNTER — TELEPHONE (OUTPATIENT)
Dept: CARDIOLOGY CLINIC | Age: 74
End: 2019-11-19

## 2019-11-20 ENCOUNTER — TELEPHONE (OUTPATIENT)
Dept: PHARMACY | Age: 74
End: 2019-11-20

## 2019-11-22 DIAGNOSIS — I50.22 CHRONIC SYSTOLIC HEART FAILURE (HCC): ICD-10-CM

## 2019-11-22 LAB
ANION GAP SERPL CALCULATED.3IONS-SCNC: 18 MMOL/L (ref 3–16)
BUN BLDV-MCNC: 43 MG/DL (ref 7–20)
CALCIUM SERPL-MCNC: 9.1 MG/DL (ref 8.3–10.6)
CHLORIDE BLD-SCNC: 99 MMOL/L (ref 99–110)
CO2: 21 MMOL/L (ref 21–32)
CREAT SERPL-MCNC: 1.8 MG/DL (ref 0.8–1.3)
GFR AFRICAN AMERICAN: 45
GFR NON-AFRICAN AMERICAN: 37
GLUCOSE BLD-MCNC: 217 MG/DL (ref 70–99)
POTASSIUM SERPL-SCNC: 4.5 MMOL/L (ref 3.5–5.1)
SODIUM BLD-SCNC: 138 MMOL/L (ref 136–145)

## 2019-11-25 DIAGNOSIS — I50.22 CHRONIC SYSTOLIC HEART FAILURE (HCC): Primary | ICD-10-CM

## 2019-11-26 ENCOUNTER — OFFICE VISIT (OUTPATIENT)
Dept: FAMILY MEDICINE CLINIC | Age: 74
End: 2019-11-26
Payer: MEDICARE

## 2019-11-26 VITALS
BODY MASS INDEX: 33.98 KG/M2 | HEIGHT: 68 IN | SYSTOLIC BLOOD PRESSURE: 110 MMHG | WEIGHT: 224.2 LBS | TEMPERATURE: 98.2 F | DIASTOLIC BLOOD PRESSURE: 70 MMHG

## 2019-11-26 DIAGNOSIS — F41.9 ACUTE ANXIETY: ICD-10-CM

## 2019-11-26 DIAGNOSIS — Z79.4 TYPE 2 DIABETES MELLITUS WITH HYPEROSMOLARITY WITHOUT COMA, WITH LONG-TERM CURRENT USE OF INSULIN (HCC): Primary | ICD-10-CM

## 2019-11-26 DIAGNOSIS — N18.30 STAGE 3 CHRONIC KIDNEY DISEASE (HCC): ICD-10-CM

## 2019-11-26 DIAGNOSIS — E11.00 TYPE 2 DIABETES MELLITUS WITH HYPEROSMOLARITY WITHOUT COMA, WITH LONG-TERM CURRENT USE OF INSULIN (HCC): Primary | ICD-10-CM

## 2019-11-26 DIAGNOSIS — Z02.83 ENCOUNTER FOR DRUG SCREENING: ICD-10-CM

## 2019-11-26 PROCEDURE — 99214 OFFICE O/P EST MOD 30 MIN: CPT | Performed by: FAMILY MEDICINE

## 2019-11-26 RX ORDER — ALPRAZOLAM 0.5 MG/1
TABLET ORAL
Qty: 90 TABLET | Refills: 0 | Status: SHIPPED | OUTPATIENT
Start: 2019-11-26 | End: 2020-01-01 | Stop reason: SDUPTHER

## 2019-11-26 ASSESSMENT — ENCOUNTER SYMPTOMS
ALLERGIC/IMMUNOLOGIC NEGATIVE: 1
EYES NEGATIVE: 1
GASTROINTESTINAL NEGATIVE: 1
RESPIRATORY NEGATIVE: 1

## 2019-11-27 LAB
AMPHETAMINE SCREEN, URINE: ABNORMAL
BARBITURATE SCREEN URINE: ABNORMAL
BENZODIAZEPINE SCREEN, URINE: POSITIVE
CANNABINOID SCREEN URINE: ABNORMAL
COCAINE METABOLITE SCREEN URINE: ABNORMAL
Lab: ABNORMAL
METHADONE SCREEN, URINE: ABNORMAL
OPIATE SCREEN URINE: ABNORMAL
OXYCODONE URINE: ABNORMAL
PH UA: 5
PHENCYCLIDINE SCREEN URINE: ABNORMAL
PROPOXYPHENE SCREEN: ABNORMAL

## 2019-12-17 ENCOUNTER — TELEPHONE (OUTPATIENT)
Dept: FAMILY MEDICINE CLINIC | Age: 74
End: 2019-12-17

## 2020-01-01 ENCOUNTER — TELEPHONE (OUTPATIENT)
Dept: CARDIOLOGY CLINIC | Age: 75
End: 2020-01-01

## 2020-01-01 ENCOUNTER — NURSE ONLY (OUTPATIENT)
Dept: CARDIOLOGY CLINIC | Age: 75
End: 2020-01-01
Payer: MEDICARE

## 2020-01-01 ENCOUNTER — OFFICE VISIT (OUTPATIENT)
Dept: FAMILY MEDICINE CLINIC | Age: 75
End: 2020-01-01
Payer: MEDICARE

## 2020-01-01 ENCOUNTER — VIRTUAL VISIT (OUTPATIENT)
Dept: FAMILY MEDICINE CLINIC | Age: 75
End: 2020-01-01
Payer: MEDICARE

## 2020-01-01 ENCOUNTER — OFFICE VISIT (OUTPATIENT)
Dept: CARDIOLOGY CLINIC | Age: 75
End: 2020-01-01
Payer: MEDICARE

## 2020-01-01 ENCOUNTER — TELEPHONE (OUTPATIENT)
Dept: FAMILY MEDICINE CLINIC | Age: 75
End: 2020-01-01

## 2020-01-01 ENCOUNTER — HOSPITAL ENCOUNTER (OUTPATIENT)
Dept: INFUSION THERAPY | Age: 75
Setting detail: INFUSION SERIES
Discharge: HOME | End: 2020-12-10
Payer: MEDICARE

## 2020-01-01 ENCOUNTER — HOSPITAL ENCOUNTER (OUTPATIENT)
Age: 75
Setting detail: OUTPATIENT SURGERY
Discharge: HOME | End: 2020-01-14
Attending: OPHTHALMOLOGY | Admitting: OPHTHALMOLOGY
Payer: MEDICARE

## 2020-01-01 ENCOUNTER — APPOINTMENT (OUTPATIENT)
Dept: GENERAL RADIOLOGY | Age: 75
DRG: 177 | End: 2020-01-01
Payer: MEDICARE

## 2020-01-01 ENCOUNTER — HOSPITAL ENCOUNTER (OUTPATIENT)
Dept: SURGERY | Age: 75
Setting detail: OUTPATIENT SURGERY
Discharge: HOME | End: 2020-08-24
Payer: MEDICARE

## 2020-01-01 ENCOUNTER — HOSPITAL ENCOUNTER (INPATIENT)
Age: 75
LOS: 10 days | DRG: 177 | End: 2021-01-01
Attending: EMERGENCY MEDICINE | Admitting: INTERNAL MEDICINE
Payer: MEDICARE

## 2020-01-01 ENCOUNTER — PREP FOR PROCEDURE (OUTPATIENT)
Dept: OPHTHALMOLOGY | Age: 75
End: 2020-01-01

## 2020-01-01 ENCOUNTER — ANESTHESIA (OUTPATIENT)
Dept: SURGERY | Age: 75
End: 2020-01-01
Payer: MEDICARE

## 2020-01-01 ENCOUNTER — ANESTHESIA EVENT (OUTPATIENT)
Dept: SURGERY | Age: 75
End: 2020-01-01
Payer: MEDICARE

## 2020-01-01 VITALS
HEIGHT: 68 IN | OXYGEN SATURATION: 96 % | WEIGHT: 232 LBS | DIASTOLIC BLOOD PRESSURE: 80 MMHG | TEMPERATURE: 97.7 F | BODY MASS INDEX: 35.16 KG/M2 | HEART RATE: 94 BPM | SYSTOLIC BLOOD PRESSURE: 135 MMHG

## 2020-01-01 VITALS
HEIGHT: 68 IN | TEMPERATURE: 97.9 F | WEIGHT: 226 LBS | BODY MASS INDEX: 34.25 KG/M2 | SYSTOLIC BLOOD PRESSURE: 124 MMHG | DIASTOLIC BLOOD PRESSURE: 82 MMHG

## 2020-01-01 VITALS
HEIGHT: 68 IN | WEIGHT: 226 LBS | TEMPERATURE: 97.5 F | SYSTOLIC BLOOD PRESSURE: 122 MMHG | BODY MASS INDEX: 34.25 KG/M2 | DIASTOLIC BLOOD PRESSURE: 80 MMHG

## 2020-01-01 VITALS
HEART RATE: 90 BPM | SYSTOLIC BLOOD PRESSURE: 128 MMHG | HEIGHT: 68 IN | RESPIRATION RATE: 14 BRPM | BODY MASS INDEX: 33.34 KG/M2 | OXYGEN SATURATION: 98 % | TEMPERATURE: 97.3 F | WEIGHT: 220 LBS | DIASTOLIC BLOOD PRESSURE: 79 MMHG

## 2020-01-01 VITALS
HEIGHT: 68 IN | SYSTOLIC BLOOD PRESSURE: 112 MMHG | DIASTOLIC BLOOD PRESSURE: 74 MMHG | HEART RATE: 68 BPM | BODY MASS INDEX: 34.71 KG/M2 | TEMPERATURE: 97.2 F | WEIGHT: 229 LBS

## 2020-01-01 VITALS — DIASTOLIC BLOOD PRESSURE: 76 MMHG | OXYGEN SATURATION: 99 % | SYSTOLIC BLOOD PRESSURE: 119 MMHG

## 2020-01-01 VITALS
SYSTOLIC BLOOD PRESSURE: 120 MMHG | WEIGHT: 224 LBS | HEIGHT: 68 IN | DIASTOLIC BLOOD PRESSURE: 60 MMHG | BODY MASS INDEX: 33.95 KG/M2 | OXYGEN SATURATION: 96 % | HEART RATE: 93 BPM

## 2020-01-01 VITALS
TEMPERATURE: 97.5 F | SYSTOLIC BLOOD PRESSURE: 136 MMHG | DIASTOLIC BLOOD PRESSURE: 82 MMHG | HEIGHT: 68 IN | BODY MASS INDEX: 37.89 KG/M2 | HEIGHT: 68 IN | SYSTOLIC BLOOD PRESSURE: 124 MMHG | WEIGHT: 250 LBS | BODY MASS INDEX: 36.07 KG/M2 | HEART RATE: 80 BPM | WEIGHT: 238 LBS | TEMPERATURE: 97 F | DIASTOLIC BLOOD PRESSURE: 84 MMHG

## 2020-01-01 VITALS
TEMPERATURE: 97.3 F | WEIGHT: 236 LBS | SYSTOLIC BLOOD PRESSURE: 126 MMHG | DIASTOLIC BLOOD PRESSURE: 80 MMHG | HEART RATE: 88 BPM | BODY MASS INDEX: 35.77 KG/M2 | HEIGHT: 68 IN

## 2020-01-01 VITALS — DIASTOLIC BLOOD PRESSURE: 93 MMHG | SYSTOLIC BLOOD PRESSURE: 154 MMHG

## 2020-01-01 VITALS
HEIGHT: 68 IN | TEMPERATURE: 97.7 F | DIASTOLIC BLOOD PRESSURE: 82 MMHG | SYSTOLIC BLOOD PRESSURE: 138 MMHG | BODY MASS INDEX: 34.86 KG/M2 | WEIGHT: 230 LBS

## 2020-01-01 DIAGNOSIS — R09.02 HYPOXEMIA REQUIRING SUPPLEMENTAL OXYGEN: ICD-10-CM

## 2020-01-01 DIAGNOSIS — Z79.4 UNCONTROLLED TYPE 2 DIABETES MELLITUS WITH HYPEROSMOLARITY WITHOUT COMA, WITH LONG-TERM CURRENT USE OF INSULIN (HCC): ICD-10-CM

## 2020-01-01 DIAGNOSIS — Z01.818 PREOP EXAMINATION: ICD-10-CM

## 2020-01-01 DIAGNOSIS — U07.1 PNEUMONIA DUE TO COVID-19 VIRUS: Primary | ICD-10-CM

## 2020-01-01 DIAGNOSIS — Z99.81 HYPOXEMIA REQUIRING SUPPLEMENTAL OXYGEN: ICD-10-CM

## 2020-01-01 DIAGNOSIS — J12.82 PNEUMONIA DUE TO COVID-19 VIRUS: Primary | ICD-10-CM

## 2020-01-01 DIAGNOSIS — E11.9 TYPE 2 DIABETES MELLITUS WITHOUT COMPLICATION, WITH LONG-TERM CURRENT USE OF INSULIN (HCC): ICD-10-CM

## 2020-01-01 DIAGNOSIS — E11.00 UNCONTROLLED TYPE 2 DIABETES MELLITUS WITH HYPEROSMOLARITY WITHOUT COMA, WITH LONG-TERM CURRENT USE OF INSULIN (HCC): ICD-10-CM

## 2020-01-01 DIAGNOSIS — R60.9 DEPENDENT EDEMA: ICD-10-CM

## 2020-01-01 DIAGNOSIS — I10 ESSENTIAL HYPERTENSION: ICD-10-CM

## 2020-01-01 DIAGNOSIS — Z79.4 TYPE 2 DIABETES MELLITUS WITHOUT COMPLICATION, WITH LONG-TERM CURRENT USE OF INSULIN (HCC): ICD-10-CM

## 2020-01-01 DIAGNOSIS — R80.9 MICROALBUMINURIA: ICD-10-CM

## 2020-01-01 DIAGNOSIS — E11.65 TYPE 2 DIABETES MELLITUS WITH HYPERGLYCEMIA, UNSPECIFIED WHETHER LONG TERM INSULIN USE (HCC): ICD-10-CM

## 2020-01-01 LAB
A/G RATIO: 0.6 (ref 1.1–2.2)
A/G RATIO: 0.6 (ref 1.1–2.2)
A/G RATIO: 0.7 (ref 1.1–2.2)
A/G RATIO: 1 (ref 1.1–2.2)
A/G RATIO: 1.2 (ref 1.1–2.2)
ABO/RH: NORMAL
ALBUMIN SERPL-MCNC: 2.1 G/DL (ref 3.4–5)
ALBUMIN SERPL-MCNC: 2.4 G/DL (ref 3.4–5)
ALBUMIN SERPL-MCNC: 2.4 G/DL (ref 3.4–5)
ALBUMIN SERPL-MCNC: 2.5 G/DL (ref 3.4–5)
ALBUMIN SERPL-MCNC: 2.5 G/DL (ref 3.4–5)
ALBUMIN SERPL-MCNC: 2.6 G/DL (ref 3.4–5)
ALBUMIN SERPL-MCNC: 2.7 G/DL (ref 3.4–5)
ALBUMIN SERPL-MCNC: 2.8 G/DL (ref 3.4–5)
ALBUMIN SERPL-MCNC: 3.1 G/DL (ref 3.4–5)
ALBUMIN SERPL-MCNC: 3.7 G/DL (ref 3.4–5)
ALBUMIN SERPL-MCNC: 3.8 G/DL (ref 3.4–5)
ALP BLD-CCNC: 103 U/L (ref 40–129)
ALP BLD-CCNC: 76 U/L (ref 40–129)
ALP BLD-CCNC: 79 U/L (ref 40–129)
ALP BLD-CCNC: 89 U/L (ref 40–129)
ALP BLD-CCNC: 92 U/L (ref 40–129)
ALP BLD-CCNC: 97 U/L (ref 40–129)
ALP BLD-CCNC: 99 U/L (ref 40–129)
ALT SERPL-CCNC: 11 U/L (ref 10–40)
ALT SERPL-CCNC: 12 U/L (ref 10–40)
ALT SERPL-CCNC: 19 U/L (ref 10–40)
ALT SERPL-CCNC: 20 U/L (ref 10–40)
ALT SERPL-CCNC: 23 U/L (ref 10–40)
ALT SERPL-CCNC: 26 U/L (ref 10–40)
ALT SERPL-CCNC: 30 U/L (ref 10–40)
ANION GAP SERPL CALCULATED.3IONS-SCNC: 11 MMOL/L (ref 3–16)
ANION GAP SERPL CALCULATED.3IONS-SCNC: 12 MMOL/L (ref 3–16)
ANION GAP SERPL CALCULATED.3IONS-SCNC: 13 MMOL/L (ref 3–16)
ANION GAP SERPL CALCULATED.3IONS-SCNC: 14 MMOL/L (ref 3–16)
ANION GAP SERPL CALCULATED.3IONS-SCNC: 15 MMOL/L (ref 3–16)
ANION GAP SERPL CALCULATED.3IONS-SCNC: 18 MMOL/L (ref 3–16)
ANION GAP SERPL CALCULATED.3IONS-SCNC: 19 MMOL/L (ref 3–16)
ANION GAP SERPL CALCULATED.3IONS-SCNC: 22 MMOL/L (ref 3–16)
ANTIBODY SCREEN: NORMAL
AST SERPL-CCNC: 18 U/L (ref 15–37)
AST SERPL-CCNC: 19 U/L (ref 15–37)
AST SERPL-CCNC: 25 U/L (ref 15–37)
AST SERPL-CCNC: 28 U/L (ref 15–37)
AST SERPL-CCNC: 30 U/L (ref 15–37)
AST SERPL-CCNC: 34 U/L (ref 15–37)
AST SERPL-CCNC: 43 U/L (ref 15–37)
BASE EXCESS ARTERIAL: 2.3 MMOL/L (ref -3–3)
BASE EXCESS ARTERIAL: 4 MMOL/L (ref -3–3)
BASE EXCESS VENOUS: 0.9 MMOL/L
BASOPHILS ABSOLUTE: 0 K/UL (ref 0–0.2)
BASOPHILS ABSOLUTE: 0 K/UL (ref 0–0.2)
BASOPHILS ABSOLUTE: 0.1 K/UL (ref 0–0.2)
BASOPHILS RELATIVE PERCENT: 0.2 %
BASOPHILS RELATIVE PERCENT: 0.4 %
BASOPHILS RELATIVE PERCENT: 0.8 %
BETA-HYDROXYBUTYRATE: 1 MMOL/L (ref 0–0.27)
BILIRUB SERPL-MCNC: 0.5 MG/DL (ref 0–1)
BILIRUB SERPL-MCNC: 0.5 MG/DL (ref 0–1)
BILIRUB SERPL-MCNC: 0.6 MG/DL (ref 0–1)
BILIRUB SERPL-MCNC: 0.7 MG/DL (ref 0–1)
BILIRUB SERPL-MCNC: 0.8 MG/DL (ref 0–1)
BILIRUB SERPL-MCNC: 0.8 MG/DL (ref 0–1)
BILIRUB SERPL-MCNC: 1.4 MG/DL (ref 0–1)
BLOOD BANK DISPENSE STATUS: NORMAL
BLOOD BANK PRODUCT CODE: NORMAL
BLOOD CULTURE, ROUTINE: NORMAL
BPU ID: NORMAL
BUN BLDV-MCNC: 30 MG/DL (ref 7–20)
BUN BLDV-MCNC: 30 MG/DL (ref 7–20)
BUN BLDV-MCNC: 34 MG/DL (ref 7–20)
BUN BLDV-MCNC: 37 MG/DL (ref 7–20)
BUN BLDV-MCNC: 38 MG/DL (ref 7–20)
BUN BLDV-MCNC: 40 MG/DL (ref 7–20)
BUN BLDV-MCNC: 49 MG/DL (ref 7–20)
BUN BLDV-MCNC: 62 MG/DL (ref 7–20)
BUN BLDV-MCNC: 70 MG/DL (ref 7–20)
BUN BLDV-MCNC: 74 MG/DL (ref 7–20)
BUN BLDV-MCNC: 75 MG/DL (ref 7–20)
BUN BLDV-MCNC: 77 MG/DL (ref 7–20)
BUN BLDV-MCNC: 77 MG/DL (ref 7–20)
BUN BLDV-MCNC: 79 MG/DL (ref 7–20)
BUN BLDV-MCNC: 93 MG/DL (ref 7–20)
C-REACTIVE PROTEIN: 112 MG/L (ref 0–5.1)
C-REACTIVE PROTEIN: 140.4 MG/L (ref 0–5.1)
C-REACTIVE PROTEIN: 148.5 MG/L (ref 0–5.1)
C-REACTIVE PROTEIN: 197.9 MG/L (ref 0–5.1)
C-REACTIVE PROTEIN: 216.5 MG/L (ref 0–5.1)
C-REACTIVE PROTEIN: 230.8 MG/L (ref 0–5.1)
C-REACTIVE PROTEIN: 77.8 MG/L (ref 0–5.1)
C-REACTIVE PROTEIN: 84.1 MG/L (ref 0–5.1)
C-REACTIVE PROTEIN: 84.6 MG/L (ref 0–5.1)
C-REACTIVE PROTEIN: 89.7 MG/L (ref 0–5.1)
CALCIUM SERPL-MCNC: 8.8 MG/DL (ref 8.3–10.6)
CALCIUM SERPL-MCNC: 9 MG/DL (ref 8.3–10.6)
CALCIUM SERPL-MCNC: 9.1 MG/DL (ref 8.3–10.6)
CALCIUM SERPL-MCNC: 9.2 MG/DL (ref 8.3–10.6)
CALCIUM SERPL-MCNC: 9.3 MG/DL (ref 8.3–10.6)
CALCIUM SERPL-MCNC: 9.3 MG/DL (ref 8.3–10.6)
CALCIUM SERPL-MCNC: 9.4 MG/DL (ref 8.3–10.6)
CALCIUM SERPL-MCNC: 9.4 MG/DL (ref 8.3–10.6)
CALCIUM SERPL-MCNC: 9.7 MG/DL (ref 8.3–10.6)
CARBOXYHEMOGLOBIN ARTERIAL: 0.7 % (ref 0–1.5)
CARBOXYHEMOGLOBIN ARTERIAL: 1.3 % (ref 0–1.5)
CARBOXYHEMOGLOBIN: 1.3 %
CHLORIDE BLD-SCNC: 88 MMOL/L (ref 99–110)
CHLORIDE BLD-SCNC: 91 MMOL/L (ref 99–110)
CHLORIDE BLD-SCNC: 93 MMOL/L (ref 99–110)
CHLORIDE BLD-SCNC: 93 MMOL/L (ref 99–110)
CHLORIDE BLD-SCNC: 94 MMOL/L (ref 99–110)
CHLORIDE BLD-SCNC: 94 MMOL/L (ref 99–110)
CHLORIDE BLD-SCNC: 95 MMOL/L (ref 99–110)
CHLORIDE BLD-SCNC: 96 MMOL/L (ref 99–110)
CHLORIDE BLD-SCNC: 96 MMOL/L (ref 99–110)
CHLORIDE BLD-SCNC: 97 MMOL/L (ref 99–110)
CHLORIDE BLD-SCNC: 98 MMOL/L (ref 99–110)
CHOLESTEROL, TOTAL: 122 MG/DL (ref 0–199)
CO2: 21 MMOL/L (ref 21–32)
CO2: 22 MMOL/L (ref 21–32)
CO2: 23 MMOL/L (ref 21–32)
CO2: 24 MMOL/L (ref 21–32)
CO2: 24 MMOL/L (ref 21–32)
CO2: 25 MMOL/L (ref 21–32)
CO2: 26 MMOL/L (ref 21–32)
CO2: 27 MMOL/L (ref 21–32)
CO2: 27 MMOL/L (ref 21–32)
CO2: 28 MMOL/L (ref 21–32)
CO2: 28 MMOL/L (ref 21–32)
CREAT SERPL-MCNC: 1.3 MG/DL (ref 0.8–1.3)
CREAT SERPL-MCNC: 1.4 MG/DL (ref 0.8–1.3)
CREAT SERPL-MCNC: 1.5 MG/DL (ref 0.8–1.3)
CREAT SERPL-MCNC: 1.6 MG/DL (ref 0.8–1.3)
CREAT SERPL-MCNC: 1.7 MG/DL (ref 0.8–1.3)
CREAT SERPL-MCNC: 1.8 MG/DL (ref 0.8–1.3)
CREAT SERPL-MCNC: 1.9 MG/DL (ref 0.8–1.3)
CREAT SERPL-MCNC: 1.9 MG/DL (ref 0.8–1.3)
CREATININE URINE: 71.4 MG/DL (ref 39–259)
CULTURE, BLOOD 2: NORMAL
D DIMER: 1075 NG/ML DDU (ref 0–229)
D DIMER: 1568 NG/ML DDU (ref 0–229)
D DIMER: 528 NG/ML DDU (ref 0–229)
D DIMER: 537 NG/ML DDU (ref 0–229)
D DIMER: 563 NG/ML DDU (ref 0–229)
D DIMER: 605 NG/ML DDU (ref 0–229)
D DIMER: 619 NG/ML DDU (ref 0–229)
D DIMER: 631 NG/ML DDU (ref 0–229)
D DIMER: 679 NG/ML DDU (ref 0–229)
D DIMER: 717 NG/ML DDU (ref 0–229)
DESCRIPTION BLOOD BANK: NORMAL
EKG ATRIAL RATE: 82 BPM
EKG DIAGNOSIS: NORMAL
EKG Q-T INTERVAL: 454 MS
EKG QRS DURATION: 176 MS
EKG QTC CALCULATION (BAZETT): 552 MS
EKG R AXIS: -88 DEGREES
EKG T AXIS: 90 DEGREES
EKG VENTRICULAR RATE: 89 BPM
EOSINOPHILS ABSOLUTE: 0 K/UL (ref 0–0.6)
EOSINOPHILS RELATIVE PERCENT: 0 %
ESTIMATED AVERAGE GLUCOSE: 194.4 MG/DL
ESTIMATED AVERAGE GLUCOSE: 205.9 MG/DL
FERRITIN: 1164 NG/ML (ref 30–400)
FERRITIN: 2501 NG/ML (ref 30–400)
FERRITIN: 474.6 NG/ML (ref 30–400)
FERRITIN: 5254 NG/ML (ref 30–400)
FERRITIN: 5390 NG/ML (ref 30–400)
FERRITIN: 7314 NG/ML (ref 30–400)
FERRITIN: 7670 NG/ML (ref 30–400)
FERRITIN: 775.2 NG/ML (ref 30–400)
FERRITIN: 7835 NG/ML (ref 30–400)
FERRITIN: 8117 NG/ML (ref 30–400)
FIBRINOGEN: 437 MG/DL (ref 200–397)
FIBRINOGEN: 477 MG/DL (ref 200–397)
FIBRINOGEN: 580 MG/DL (ref 200–397)
FIBRINOGEN: 616 MG/DL (ref 200–397)
FIBRINOGEN: 654 MG/DL (ref 200–397)
FIBRINOGEN: 654 MG/DL (ref 200–397)
FIBRINOGEN: 692 MG/DL (ref 200–397)
FIBRINOGEN: 718 MG/DL (ref 200–397)
FIBRINOGEN: 743 MG/DL (ref 200–397)
GFR AFRICAN AMERICAN: 42
GFR AFRICAN AMERICAN: 42
GFR AFRICAN AMERICAN: 45
GFR AFRICAN AMERICAN: 48
GFR AFRICAN AMERICAN: 51
GFR AFRICAN AMERICAN: 51
GFR AFRICAN AMERICAN: 52
GFR AFRICAN AMERICAN: 55
GFR AFRICAN AMERICAN: 60
GFR AFRICAN AMERICAN: >60
GFR NON-AFRICAN AMERICAN: 35
GFR NON-AFRICAN AMERICAN: 35
GFR NON-AFRICAN AMERICAN: 37
GFR NON-AFRICAN AMERICAN: 40
GFR NON-AFRICAN AMERICAN: 42
GFR NON-AFRICAN AMERICAN: 42
GFR NON-AFRICAN AMERICAN: 43
GFR NON-AFRICAN AMERICAN: 46
GFR NON-AFRICAN AMERICAN: 50
GFR NON-AFRICAN AMERICAN: 54
GLOBULIN: 3.1 G/DL
GLOBULIN: 3.6 G/DL
GLOBULIN: 3.6 G/DL
GLOBULIN: 3.7 G/DL
GLOBULIN: 3.8 G/DL
GLOBULIN: 4 G/DL
GLOBULIN: 4.5 G/DL
GLUCOSE BLD-MCNC: 108 MG/DL (ref 70–99)
GLUCOSE BLD-MCNC: 108 MG/DL (ref 70–99)
GLUCOSE BLD-MCNC: 111 MG/DL (ref 70–99)
GLUCOSE BLD-MCNC: 125 MG/DL (ref 70–99)
GLUCOSE BLD-MCNC: 129 MG/DL (ref 70–99)
GLUCOSE BLD-MCNC: 130 MG/DL (ref 70–99)
GLUCOSE BLD-MCNC: 139 MG/DL (ref 70–99)
GLUCOSE BLD-MCNC: 140 MG/DL (ref 70–99)
GLUCOSE BLD-MCNC: 143 MG/DL (ref 70–99)
GLUCOSE BLD-MCNC: 148 MG/DL (ref 70–99)
GLUCOSE BLD-MCNC: 148 MG/DL (ref 70–99)
GLUCOSE BLD-MCNC: 150 MG/DL (ref 70–99)
GLUCOSE BLD-MCNC: 154 MG/DL (ref 70–99)
GLUCOSE BLD-MCNC: 154 MG/DL (ref 70–99)
GLUCOSE BLD-MCNC: 159 MG/DL (ref 70–99)
GLUCOSE BLD-MCNC: 160 MG/DL (ref 70–99)
GLUCOSE BLD-MCNC: 160 MG/DL (ref 70–99)
GLUCOSE BLD-MCNC: 162 MG/DL (ref 70–99)
GLUCOSE BLD-MCNC: 169 MG/DL (ref 70–99)
GLUCOSE BLD-MCNC: 169 MG/DL (ref 70–99)
GLUCOSE BLD-MCNC: 174 MG/DL (ref 70–99)
GLUCOSE BLD-MCNC: 175 MG/DL (ref 70–99)
GLUCOSE BLD-MCNC: 176 MG/DL (ref 70–99)
GLUCOSE BLD-MCNC: 180 MG/DL (ref 70–99)
GLUCOSE BLD-MCNC: 181 MG/DL (ref 70–99)
GLUCOSE BLD-MCNC: 184 MG/DL (ref 70–99)
GLUCOSE BLD-MCNC: 187 MG/DL (ref 70–99)
GLUCOSE BLD-MCNC: 187 MG/DL (ref 70–99)
GLUCOSE BLD-MCNC: 188 MG/DL (ref 70–99)
GLUCOSE BLD-MCNC: 189 MG/DL (ref 70–99)
GLUCOSE BLD-MCNC: 190 MG/DL (ref 70–99)
GLUCOSE BLD-MCNC: 191 MG/DL (ref 70–99)
GLUCOSE BLD-MCNC: 192 MG/DL (ref 70–99)
GLUCOSE BLD-MCNC: 204 MG/DL (ref 70–99)
GLUCOSE BLD-MCNC: 204 MG/DL (ref 70–99)
GLUCOSE BLD-MCNC: 205 MG/DL (ref 70–99)
GLUCOSE BLD-MCNC: 207 MG/DL (ref 70–99)
GLUCOSE BLD-MCNC: 209 MG/DL (ref 70–99)
GLUCOSE BLD-MCNC: 209 MG/DL (ref 70–99)
GLUCOSE BLD-MCNC: 210 MG/DL (ref 70–99)
GLUCOSE BLD-MCNC: 215 MG/DL (ref 70–99)
GLUCOSE BLD-MCNC: 218 MG/DL (ref 70–99)
GLUCOSE BLD-MCNC: 220 MG/DL (ref 70–99)
GLUCOSE BLD-MCNC: 221 MG/DL (ref 70–99)
GLUCOSE BLD-MCNC: 225 MG/DL (ref 70–99)
GLUCOSE BLD-MCNC: 229 MG/DL (ref 70–99)
GLUCOSE BLD-MCNC: 233 MG/DL (ref 70–99)
GLUCOSE BLD-MCNC: 237 MG/DL (ref 70–99)
GLUCOSE BLD-MCNC: 242 MG/DL (ref 70–99)
GLUCOSE BLD-MCNC: 244 MG/DL (ref 70–99)
GLUCOSE BLD-MCNC: 251 MG/DL (ref 70–99)
GLUCOSE BLD-MCNC: 267 MG/DL (ref 70–99)
GLUCOSE BLD-MCNC: 268 MG/DL (ref 70–99)
GLUCOSE BLD-MCNC: 275 MG/DL (ref 70–99)
GLUCOSE BLD-MCNC: 286 MG/DL (ref 70–99)
GLUCOSE BLD-MCNC: 303 MG/DL (ref 70–99)
GLUCOSE BLD-MCNC: 305 MG/DL (ref 70–99)
GLUCOSE BLD-MCNC: 313 MG/DL (ref 70–99)
GLUCOSE BLD-MCNC: 331 MG/DL (ref 70–99)
GLUCOSE BLD-MCNC: 335 MG/DL (ref 70–99)
GLUCOSE BLD-MCNC: 345 MG/DL (ref 70–99)
GLUCOSE BLD-MCNC: 351 MG/DL (ref 70–99)
GLUCOSE BLD-MCNC: 351 MG/DL (ref 70–99)
GLUCOSE BLD-MCNC: 364 MG/DL (ref 70–99)
GLUCOSE BLD-MCNC: 395 MG/DL (ref 70–99)
GLUCOSE BLD-MCNC: 396 MG/DL (ref 70–99)
GLUCOSE BLD-MCNC: 396 MG/DL (ref 70–99)
GLUCOSE BLD-MCNC: 416 MG/DL (ref 70–99)
GLUCOSE BLD-MCNC: 420 MG/DL (ref 70–99)
GLUCOSE BLD-MCNC: 424 MG/DL (ref 70–99)
GLUCOSE BLD-MCNC: 76 MG/DL (ref 70–99)
HBA1C MFR BLD: 8.4 %
HBA1C MFR BLD: 8.8 %
HCO3 ARTERIAL: 31.6 MMOL/L (ref 21–29)
HCO3 ARTERIAL: 31.7 MMOL/L (ref 21–29)
HCO3 VENOUS: 28 MMOL/L (ref 23–29)
HCT VFR BLD CALC: 43.1 % (ref 40.5–52.5)
HCT VFR BLD CALC: 43.7 % (ref 40.5–52.5)
HCT VFR BLD CALC: 44.7 % (ref 40.5–52.5)
HDLC SERPL-MCNC: 42 MG/DL (ref 40–60)
HEMOGLOBIN, ART, EXTENDED: 13.8 G/DL (ref 13.5–17.5)
HEMOGLOBIN, ART, EXTENDED: 14.2 G/DL (ref 13.5–17.5)
HEMOGLOBIN: 14.1 G/DL (ref 13.5–17.5)
HEMOGLOBIN: 14.4 G/DL (ref 13.5–17.5)
HEMOGLOBIN: 14.5 G/DL (ref 13.5–17.5)
INFLUENZA A ANTIGEN, POC: NEGATIVE
INFLUENZA B ANTIGEN, POC: NEGATIVE
INR BLD: 1.83 (ref 0.86–1.14)
L. PNEUMOPHILA SEROGP 1 UR AG: NORMAL
LACTATE DEHYDROGENASE: 244 U/L (ref 100–190)
LACTIC ACID, SEPSIS: 2.2 MMOL/L (ref 0.4–1.9)
LACTIC ACID: 2.2 MMOL/L (ref 0.4–2)
LDL CHOLESTEROL CALCULATED: 60 MG/DL
LYMPHOCYTES ABSOLUTE: 0.1 K/UL (ref 1–5.1)
LYMPHOCYTES ABSOLUTE: 0.1 K/UL (ref 1–5.1)
LYMPHOCYTES ABSOLUTE: 0.2 K/UL (ref 1–5.1)
LYMPHOCYTES RELATIVE PERCENT: 0.9 %
LYMPHOCYTES RELATIVE PERCENT: 1.2 %
LYMPHOCYTES RELATIVE PERCENT: 2 %
MCH RBC QN AUTO: 28.8 PG (ref 26–34)
MCH RBC QN AUTO: 29.3 PG (ref 26–34)
MCH RBC QN AUTO: 29.8 PG (ref 26–34)
MCHC RBC AUTO-ENTMCNC: 32.4 G/DL (ref 31–36)
MCHC RBC AUTO-ENTMCNC: 32.6 G/DL (ref 31–36)
MCHC RBC AUTO-ENTMCNC: 33 G/DL (ref 31–36)
MCV RBC AUTO: 88.9 FL (ref 80–100)
MCV RBC AUTO: 89.6 FL (ref 80–100)
MCV RBC AUTO: 90.3 FL (ref 80–100)
METHEMOGLOBIN ARTERIAL: 0.2 %
METHEMOGLOBIN ARTERIAL: 0.3 %
METHEMOGLOBIN VENOUS: 0.5 %
MICROALBUMIN UR-MCNC: 12.3 MG/DL
MICROALBUMIN/CREAT UR-RTO: 172.3 MG/G (ref 0–30)
MONOCYTES ABSOLUTE: 0.3 K/UL (ref 0–1.3)
MONOCYTES ABSOLUTE: 0.4 K/UL (ref 0–1.3)
MONOCYTES ABSOLUTE: 0.5 K/UL (ref 0–1.3)
MONOCYTES RELATIVE PERCENT: 4 %
MONOCYTES RELATIVE PERCENT: 4.5 %
MONOCYTES RELATIVE PERCENT: 6.1 %
NEUTROPHILS ABSOLUTE: 7.3 K/UL (ref 1.7–7.7)
NEUTROPHILS ABSOLUTE: 7.3 K/UL (ref 1.7–7.7)
NEUTROPHILS ABSOLUTE: 8.8 K/UL (ref 1.7–7.7)
NEUTROPHILS RELATIVE PERCENT: 91.5 %
NEUTROPHILS RELATIVE PERCENT: 94 %
NEUTROPHILS RELATIVE PERCENT: 94.4 %
O2 CONTENT ARTERIAL: 18 ML/DL
O2 CONTENT ARTERIAL: 18 ML/DL
O2 CONTENT, VEN: 16 ML/DL
O2 SAT, ARTERIAL: 92 %
O2 SAT, ARTERIAL: 95.7 %
O2 SAT, VEN: 86 %
O2 THERAPY: ABNORMAL
PCO2 ARTERIAL: 59.7 MMHG (ref 35–45)
PCO2 ARTERIAL: 71.5 MMHG (ref 35–45)
PCO2, VEN: 51.1 MMHG (ref 40–50)
PDW BLD-RTO: 14.6 % (ref 12.4–15.4)
PDW BLD-RTO: 14.9 % (ref 12.4–15.4)
PDW BLD-RTO: 15 % (ref 12.4–15.4)
PERFORMED ON: ABNORMAL
PERFORMED ON: NORMAL
PH ARTERIAL: 7.25 (ref 7.35–7.45)
PH ARTERIAL: 7.33 (ref 7.35–7.45)
PH VENOUS: 7.34 (ref 7.35–7.45)
PHOSPHORUS: 2.7 MG/DL (ref 2.5–4.9)
PHOSPHORUS: 2.8 MG/DL (ref 2.5–4.9)
PHOSPHORUS: 2.8 MG/DL (ref 2.5–4.9)
PHOSPHORUS: 3.1 MG/DL (ref 2.5–4.9)
PHOSPHORUS: 3.2 MG/DL (ref 2.5–4.9)
PHOSPHORUS: 3.3 MG/DL (ref 2.5–4.9)
PHOSPHORUS: 3.5 MG/DL (ref 2.5–4.9)
PHOSPHORUS: 3.6 MG/DL (ref 2.5–4.9)
PLATELET # BLD: 109 K/UL (ref 135–450)
PLATELET # BLD: 89 K/UL (ref 135–450)
PLATELET # BLD: 92 K/UL (ref 135–450)
PLATELET SLIDE REVIEW: ABNORMAL
PLATELET SLIDE REVIEW: ABNORMAL
PMV BLD AUTO: 10.7 FL (ref 5–10.5)
PMV BLD AUTO: 11 FL (ref 5–10.5)
PMV BLD AUTO: 11.8 FL (ref 5–10.5)
PO2 ARTERIAL: 70.6 MMHG (ref 75–108)
PO2 ARTERIAL: 85 MMHG (ref 75–108)
PO2, VEN: 57 MMHG
POTASSIUM REFLEX MAGNESIUM: 3.8 MMOL/L (ref 3.5–5.1)
POTASSIUM REFLEX MAGNESIUM: 3.9 MMOL/L (ref 3.5–5.1)
POTASSIUM SERPL-SCNC: 3.7 MMOL/L (ref 3.5–5.1)
POTASSIUM SERPL-SCNC: 3.8 MMOL/L (ref 3.5–5.1)
POTASSIUM SERPL-SCNC: 3.9 MMOL/L (ref 3.5–5.1)
POTASSIUM SERPL-SCNC: 4 MMOL/L (ref 3.5–5.1)
POTASSIUM SERPL-SCNC: 4.1 MMOL/L (ref 3.5–5.1)
POTASSIUM SERPL-SCNC: 4.2 MMOL/L (ref 3.5–5.1)
POTASSIUM SERPL-SCNC: 4.4 MMOL/L (ref 3.5–5.1)
POTASSIUM SERPL-SCNC: 4.5 MMOL/L (ref 3.5–5.1)
POTASSIUM SERPL-SCNC: 4.6 MMOL/L (ref 3.5–5.1)
POTASSIUM SERPL-SCNC: 5.2 MMOL/L (ref 3.5–5.1)
PRO-BNP: ABNORMAL PG/ML (ref 0–124)
PROCALCITONIN: 0.2 NG/ML (ref 0–0.15)
PROCALCITONIN: 0.21 NG/ML (ref 0–0.15)
PROCALCITONIN: 0.31 NG/ML (ref 0–0.15)
PROTHROMBIN TIME: 21.4 SEC (ref 10–13.2)
RBC # BLD: 4.81 M/UL (ref 4.2–5.9)
RBC # BLD: 4.84 M/UL (ref 4.2–5.9)
RBC # BLD: 5.02 M/UL (ref 4.2–5.9)
SARS-COV-2: DETECTED
SLIDE REVIEW: ABNORMAL
SLIDE REVIEW: ABNORMAL
SODIUM BLD-SCNC: 130 MMOL/L (ref 136–145)
SODIUM BLD-SCNC: 131 MMOL/L (ref 136–145)
SODIUM BLD-SCNC: 132 MMOL/L (ref 136–145)
SODIUM BLD-SCNC: 133 MMOL/L (ref 136–145)
SODIUM BLD-SCNC: 133 MMOL/L (ref 136–145)
SODIUM BLD-SCNC: 134 MMOL/L (ref 136–145)
SODIUM BLD-SCNC: 135 MMOL/L (ref 136–145)
SODIUM BLD-SCNC: 137 MMOL/L (ref 136–145)
SODIUM BLD-SCNC: 138 MMOL/L (ref 136–145)
SODIUM BLD-SCNC: 138 MMOL/L (ref 136–145)
SODIUM BLD-SCNC: 140 MMOL/L (ref 136–145)
STREP PNEUMONIAE ANTIGEN, URINE: NORMAL
TCO2 ARTERIAL: 33.4 MMOL/L
TCO2 ARTERIAL: 33.9 MMOL/L
TCO2 CALC VENOUS: 29 MMOL/L
TOTAL PROTEIN: 5.7 G/DL (ref 6.4–8.2)
TOTAL PROTEIN: 6 G/DL (ref 6.4–8.2)
TOTAL PROTEIN: 6.3 G/DL (ref 6.4–8.2)
TOTAL PROTEIN: 6.6 G/DL (ref 6.4–8.2)
TOTAL PROTEIN: 6.9 G/DL (ref 6.4–8.2)
TOTAL PROTEIN: 7.2 G/DL (ref 6.4–8.2)
TOTAL PROTEIN: 7.4 G/DL (ref 6.4–8.2)
TRIGL SERPL-MCNC: 101 MG/DL (ref 0–150)
TROPONIN: 0.05 NG/ML
VITAMIN D 25-HYDROXY: 13.4 NG/ML
VLDLC SERPL CALC-MCNC: 20 MG/DL
WBC # BLD: 7.7 K/UL (ref 4–11)
WBC # BLD: 8 K/UL (ref 4–11)
WBC # BLD: 9.3 K/UL (ref 4–11)

## 2020-01-01 PROCEDURE — 97166 OT EVAL MOD COMPLEX 45 MIN: CPT

## 2020-01-01 PROCEDURE — 3017F COLORECTAL CA SCREEN DOC REV: CPT | Performed by: FAMILY MEDICINE

## 2020-01-01 PROCEDURE — 4040F PNEUMOC VAC/ADMIN/RCVD: CPT | Performed by: INTERNAL MEDICINE

## 2020-01-01 PROCEDURE — 3046F HEMOGLOBIN A1C LEVEL >9.0%: CPT | Performed by: FAMILY MEDICINE

## 2020-01-01 PROCEDURE — 2022F DILAT RTA XM EVC RTNOPTHY: CPT | Performed by: FAMILY MEDICINE

## 2020-01-01 PROCEDURE — 94761 N-INVAS EAR/PLS OXIMETRY MLT: CPT

## 2020-01-01 PROCEDURE — 36415 COLL VENOUS BLD VENIPUNCTURE: CPT

## 2020-01-01 PROCEDURE — 2500000003 HC RX 250 WO HCPCS: Performed by: INTERNAL MEDICINE

## 2020-01-01 PROCEDURE — 82803 BLOOD GASES ANY COMBINATION: CPT

## 2020-01-01 PROCEDURE — 93296 REM INTERROG EVL PM/IDS: CPT | Performed by: INTERNAL MEDICINE

## 2020-01-01 PROCEDURE — 86140 C-REACTIVE PROTEIN: CPT

## 2020-01-01 PROCEDURE — 82728 ASSAY OF FERRITIN: CPT

## 2020-01-01 PROCEDURE — 2060000000 HC ICU INTERMEDIATE R&B

## 2020-01-01 PROCEDURE — 2700000000 HC OXYGEN THERAPY PER DAY

## 2020-01-01 PROCEDURE — 85384 FIBRINOGEN ACTIVITY: CPT

## 2020-01-01 PROCEDURE — 97110 THERAPEUTIC EXERCISES: CPT

## 2020-01-01 PROCEDURE — 2580000003 HC RX 258: Performed by: INTERNAL MEDICINE

## 2020-01-01 PROCEDURE — 94660 CPAP INITIATION&MGMT: CPT

## 2020-01-01 PROCEDURE — 99441 PR PHYS/QHP TELEPHONE EVALUATION 5-10 MIN: CPT | Performed by: FAMILY MEDICINE

## 2020-01-01 PROCEDURE — 99213 OFFICE O/P EST LOW 20 MIN: CPT | Performed by: FAMILY MEDICINE

## 2020-01-01 PROCEDURE — 99232 SBSQ HOSP IP/OBS MODERATE 35: CPT | Performed by: INTERNAL MEDICINE

## 2020-01-01 PROCEDURE — 99223 1ST HOSP IP/OBS HIGH 75: CPT | Performed by: INTERNAL MEDICINE

## 2020-01-01 PROCEDURE — 3600000004 HC SURGERY LEVEL 4 BASE: Performed by: OPHTHALMOLOGY

## 2020-01-01 PROCEDURE — 71045 X-RAY EXAM CHEST 1 VIEW: CPT

## 2020-01-01 PROCEDURE — G8427 DOCREV CUR MEDS BY ELIG CLIN: HCPCS | Performed by: FAMILY MEDICINE

## 2020-01-01 PROCEDURE — 83036 HEMOGLOBIN GLYCOSYLATED A1C: CPT

## 2020-01-01 PROCEDURE — 97530 THERAPEUTIC ACTIVITIES: CPT

## 2020-01-01 PROCEDURE — G8417 CALC BMI ABV UP PARAM F/U: HCPCS | Performed by: INTERNAL MEDICINE

## 2020-01-01 PROCEDURE — 85379 FIBRIN DEGRADATION QUANT: CPT

## 2020-01-01 PROCEDURE — 3052F HG A1C>EQUAL 8.0%<EQUAL 9.0%: CPT | Performed by: FAMILY MEDICINE

## 2020-01-01 PROCEDURE — 83605 ASSAY OF LACTIC ACID: CPT

## 2020-01-01 PROCEDURE — 2500000003 HC RX 250 WO HCPCS: Performed by: OPHTHALMOLOGY

## 2020-01-01 PROCEDURE — 4040F PNEUMOC VAC/ADMIN/RCVD: CPT | Performed by: FAMILY MEDICINE

## 2020-01-01 PROCEDURE — 80069 RENAL FUNCTION PANEL: CPT

## 2020-01-01 PROCEDURE — 86900 BLOOD TYPING SEROLOGIC ABO: CPT

## 2020-01-01 PROCEDURE — 66821 AFTER CATARACT LASER SURGERY: CPT

## 2020-01-01 PROCEDURE — 7100000010 HC PHASE II RECOVERY - FIRST 15 MIN: Performed by: OPHTHALMOLOGY

## 2020-01-01 PROCEDURE — 87040 BLOOD CULTURE FOR BACTERIA: CPT

## 2020-01-01 PROCEDURE — 99214 OFFICE O/P EST MOD 30 MIN: CPT | Performed by: INTERNAL MEDICINE

## 2020-01-01 PROCEDURE — 6360000002 HC RX W HCPCS: Performed by: INTERNAL MEDICINE

## 2020-01-01 PROCEDURE — 84484 ASSAY OF TROPONIN QUANT: CPT

## 2020-01-01 PROCEDURE — 87804 INFLUENZA ASSAY W/OPTIC: CPT | Performed by: FAMILY MEDICINE

## 2020-01-01 PROCEDURE — G8926 SPIRO NO PERF OR DOC: HCPCS | Performed by: FAMILY MEDICINE

## 2020-01-01 PROCEDURE — 84145 PROCALCITONIN (PCT): CPT

## 2020-01-01 PROCEDURE — 99233 SBSQ HOSP IP/OBS HIGH 50: CPT | Performed by: INTERNAL MEDICINE

## 2020-01-01 PROCEDURE — 6370000000 HC RX 637 (ALT 250 FOR IP): Performed by: NURSE PRACTITIONER

## 2020-01-01 PROCEDURE — G8427 DOCREV CUR MEDS BY ELIG CLIN: HCPCS | Performed by: INTERNAL MEDICINE

## 2020-01-01 PROCEDURE — 6370000000 HC RX 637 (ALT 250 FOR IP): Performed by: INTERNAL MEDICINE

## 2020-01-01 PROCEDURE — 86901 BLOOD TYPING SEROLOGIC RH(D): CPT

## 2020-01-01 PROCEDURE — 1123F ACP DISCUSS/DSCN MKR DOCD: CPT | Performed by: INTERNAL MEDICINE

## 2020-01-01 PROCEDURE — G8482 FLU IMMUNIZE ORDER/ADMIN: HCPCS | Performed by: FAMILY MEDICINE

## 2020-01-01 PROCEDURE — G0008 ADMIN INFLUENZA VIRUS VAC: HCPCS | Performed by: FAMILY MEDICINE

## 2020-01-01 PROCEDURE — 84100 ASSAY OF PHOSPHORUS: CPT

## 2020-01-01 PROCEDURE — 1123F ACP DISCUSS/DSCN MKR DOCD: CPT | Performed by: FAMILY MEDICINE

## 2020-01-01 PROCEDURE — 80048 BASIC METABOLIC PNL TOTAL CA: CPT

## 2020-01-01 PROCEDURE — 99285 EMERGENCY DEPT VISIT HI MDM: CPT

## 2020-01-01 PROCEDURE — 1036F TOBACCO NON-USER: CPT | Performed by: FAMILY MEDICINE

## 2020-01-01 PROCEDURE — G8417 CALC BMI ABV UP PARAM F/U: HCPCS | Performed by: FAMILY MEDICINE

## 2020-01-01 PROCEDURE — 80053 COMPREHEN METABOLIC PANEL: CPT

## 2020-01-01 PROCEDURE — 3017F COLORECTAL CA SCREEN DOC REV: CPT | Performed by: INTERNAL MEDICINE

## 2020-01-01 PROCEDURE — XW13325 TRANSFUSION OF CONVALESCENT PLASMA (NONAUTOLOGOUS) INTO PERIPHERAL VEIN, PERCUTANEOUS APPROACH, NEW TECHNOLOGY GROUP 5: ICD-10-PCS | Performed by: INTERNAL MEDICINE

## 2020-01-01 PROCEDURE — 96361 HYDRATE IV INFUSION ADD-ON: CPT

## 2020-01-01 PROCEDURE — 6360000002 HC RX W HCPCS: Performed by: OPHTHALMOLOGY

## 2020-01-01 PROCEDURE — 90694 VACC AIIV4 NO PRSRV 0.5ML IM: CPT | Performed by: FAMILY MEDICINE

## 2020-01-01 PROCEDURE — 85025 COMPLETE CBC W/AUTO DIFF WBC: CPT

## 2020-01-01 PROCEDURE — 6370000000 HC RX 637 (ALT 250 FOR IP): Performed by: OPHTHALMOLOGY

## 2020-01-01 PROCEDURE — V2632 POST CHMBR INTRAOCULAR LENS: HCPCS | Performed by: OPHTHALMOLOGY

## 2020-01-01 PROCEDURE — XW033E5 INTRODUCTION OF REMDESIVIR ANTI-INFECTIVE INTO PERIPHERAL VEIN, PERCUTANEOUS APPROACH, NEW TECHNOLOGY GROUP 5: ICD-10-PCS | Performed by: INTERNAL MEDICINE

## 2020-01-01 PROCEDURE — 7100000011 HC PHASE II RECOVERY - ADDTL 15 MIN: Performed by: OPHTHALMOLOGY

## 2020-01-01 PROCEDURE — 87449 NOS EACH ORGANISM AG IA: CPT

## 2020-01-01 PROCEDURE — 1036F TOBACCO NON-USER: CPT | Performed by: INTERNAL MEDICINE

## 2020-01-01 PROCEDURE — 83880 ASSAY OF NATRIURETIC PEPTIDE: CPT

## 2020-01-01 PROCEDURE — 82010 KETONE BODYS QUAN: CPT

## 2020-01-01 PROCEDURE — 3700000001 HC ADD 15 MINUTES (ANESTHESIA): Performed by: OPHTHALMOLOGY

## 2020-01-01 PROCEDURE — 93297 REM INTERROG DEV EVAL ICPMS: CPT | Performed by: INTERNAL MEDICINE

## 2020-01-01 PROCEDURE — 93000 ELECTROCARDIOGRAM COMPLETE: CPT | Performed by: INTERNAL MEDICINE

## 2020-01-01 PROCEDURE — 3023F SPIROM DOC REV: CPT | Performed by: FAMILY MEDICINE

## 2020-01-01 PROCEDURE — 86850 RBC ANTIBODY SCREEN: CPT

## 2020-01-01 PROCEDURE — 3700000000 HC ANESTHESIA ATTENDED CARE: Performed by: OPHTHALMOLOGY

## 2020-01-01 PROCEDURE — 85610 PROTHROMBIN TIME: CPT

## 2020-01-01 PROCEDURE — 93005 ELECTROCARDIOGRAM TRACING: CPT | Performed by: PHYSICIAN ASSISTANT

## 2020-01-01 PROCEDURE — 96374 THER/PROPH/DIAG INJ IV PUSH: CPT

## 2020-01-01 PROCEDURE — 83615 LACTATE (LD) (LDH) ENZYME: CPT

## 2020-01-01 PROCEDURE — 97162 PT EVAL MOD COMPLEX 30 MIN: CPT

## 2020-01-01 PROCEDURE — 82306 VITAMIN D 25 HYDROXY: CPT

## 2020-01-01 PROCEDURE — 93295 DEV INTERROG REMOTE 1/2/MLT: CPT | Performed by: INTERNAL MEDICINE

## 2020-01-01 PROCEDURE — 2709999900 HC NON-CHARGEABLE SUPPLY: Performed by: OPHTHALMOLOGY

## 2020-01-01 PROCEDURE — 6360000002 HC RX W HCPCS: Performed by: PHYSICIAN ASSISTANT

## 2020-01-01 PROCEDURE — 6370000000 HC RX 637 (ALT 250 FOR IP): Performed by: PHYSICIAN ASSISTANT

## 2020-01-01 PROCEDURE — 3600000014 HC SURGERY LEVEL 4 ADDTL 15MIN: Performed by: OPHTHALMOLOGY

## 2020-01-01 PROCEDURE — 6360000002 HC RX W HCPCS

## 2020-01-01 PROCEDURE — G8484 FLU IMMUNIZE NO ADMIN: HCPCS | Performed by: FAMILY MEDICINE

## 2020-01-01 PROCEDURE — G8482 FLU IMMUNIZE ORDER/ADMIN: HCPCS | Performed by: INTERNAL MEDICINE

## 2020-01-01 PROCEDURE — 93010 ELECTROCARDIOGRAM REPORT: CPT | Performed by: INTERNAL MEDICINE

## 2020-01-01 PROCEDURE — 97535 SELF CARE MNGMENT TRAINING: CPT

## 2020-01-01 PROCEDURE — 2580000003 HC RX 258: Performed by: ANESTHESIOLOGY

## 2020-01-01 PROCEDURE — 99214 OFFICE O/P EST MOD 30 MIN: CPT | Performed by: FAMILY MEDICINE

## 2020-01-01 PROCEDURE — 99231 SBSQ HOSP IP/OBS SF/LOW 25: CPT | Performed by: INTERNAL MEDICINE

## 2020-01-01 PROCEDURE — 2580000003 HC RX 258: Performed by: PHYSICIAN ASSISTANT

## 2020-01-01 PROCEDURE — 36600 WITHDRAWAL OF ARTERIAL BLOOD: CPT

## 2020-01-01 PROCEDURE — 99215 OFFICE O/P EST HI 40 MIN: CPT | Performed by: FAMILY MEDICINE

## 2020-01-01 DEVICE — Z DUP USE 2247921 LENS INTOCU +5.0 TO +34.0 DIOPT L13MM DIA6MM UV BLK: Type: IMPLANTABLE DEVICE | Status: FUNCTIONAL

## 2020-01-01 RX ORDER — ATORVASTATIN CALCIUM 80 MG/1
80 TABLET, FILM COATED ORAL NIGHTLY
Status: DISCONTINUED | OUTPATIENT
Start: 2020-01-01 | End: 2021-01-01 | Stop reason: HOSPADM

## 2020-01-01 RX ORDER — BENZONATATE 100 MG/1
100 CAPSULE ORAL 3 TIMES DAILY PRN
Status: DISCONTINUED | OUTPATIENT
Start: 2020-01-01 | End: 2021-01-01 | Stop reason: HOSPADM

## 2020-01-01 RX ORDER — LIDOCAINE HYDROCHLORIDE 10 MG/ML
INJECTION, SOLUTION EPIDURAL; INFILTRATION; INTRACAUDAL; PERINEURAL
Status: DISCONTINUED | OUTPATIENT
Start: 2020-01-01 | End: 2020-01-01 | Stop reason: ALTCHOICE

## 2020-01-01 RX ORDER — PROMETHAZINE HYDROCHLORIDE 25 MG/1
12.5 TABLET ORAL EVERY 6 HOURS PRN
Status: DISCONTINUED | OUTPATIENT
Start: 2020-01-01 | End: 2021-01-01 | Stop reason: HOSPADM

## 2020-01-01 RX ORDER — FUROSEMIDE 10 MG/ML
80 INJECTION INTRAMUSCULAR; INTRAVENOUS ONCE
Status: COMPLETED | OUTPATIENT
Start: 2020-01-01 | End: 2020-01-01

## 2020-01-01 RX ORDER — ASPIRIN 81 MG/1
81 TABLET, CHEWABLE ORAL DAILY
Status: DISCONTINUED | OUTPATIENT
Start: 2020-01-01 | End: 2020-01-01

## 2020-01-01 RX ORDER — GABAPENTIN 100 MG/1
100 CAPSULE ORAL NIGHTLY
Qty: 90 CAPSULE | Refills: 0 | Status: SHIPPED | OUTPATIENT
Start: 2020-01-01 | End: 2020-01-01 | Stop reason: SDUPTHER

## 2020-01-01 RX ORDER — SODIUM CHLORIDE 9 MG/ML
INJECTION, SOLUTION INTRAVENOUS CONTINUOUS
Status: CANCELLED | OUTPATIENT
Start: 2020-01-01

## 2020-01-01 RX ORDER — SODIUM CHLORIDE 0.9 % (FLUSH) 0.9 %
10 SYRINGE (ML) INJECTION EVERY 12 HOURS SCHEDULED
Status: DISCONTINUED | OUTPATIENT
Start: 2020-01-01 | End: 2021-01-01 | Stop reason: HOSPADM

## 2020-01-01 RX ORDER — DEXAMETHASONE 6 MG/1
6 TABLET ORAL DAILY
Status: DISCONTINUED | OUTPATIENT
Start: 2020-01-01 | End: 2020-01-01

## 2020-01-01 RX ORDER — ALPRAZOLAM 0.5 MG/1
0.5 TABLET ORAL 2 TIMES DAILY PRN
Status: DISCONTINUED | OUTPATIENT
Start: 2020-01-01 | End: 2021-01-01 | Stop reason: HOSPADM

## 2020-01-01 RX ORDER — EPINEPHRINE 1 MG/ML
0.3 INJECTION, SOLUTION, CONCENTRATE INTRAVENOUS PRN
Status: CANCELLED | OUTPATIENT
Start: 2020-01-01

## 2020-01-01 RX ORDER — VITAMIN B COMPLEX
1000 TABLET ORAL DAILY
Status: DISCONTINUED | OUTPATIENT
Start: 2021-01-01 | End: 2021-01-01 | Stop reason: HOSPADM

## 2020-01-01 RX ORDER — INSULIN GLARGINE 100 [IU]/ML
20 INJECTION, SOLUTION SUBCUTANEOUS 2 TIMES DAILY
Status: DISCONTINUED | OUTPATIENT
Start: 2020-01-01 | End: 2020-01-01

## 2020-01-01 RX ORDER — METOPROLOL SUCCINATE 100 MG/1
TABLET, EXTENDED RELEASE ORAL
Qty: 30 TABLET | Refills: 2 | Status: SHIPPED | OUTPATIENT
Start: 2020-01-01 | End: 2020-01-01

## 2020-01-01 RX ORDER — ALPRAZOLAM 0.5 MG/1
TABLET ORAL
Qty: 90 TABLET | Refills: 0 | Status: SHIPPED | OUTPATIENT
Start: 2020-01-01 | End: 2020-01-01 | Stop reason: SDUPTHER

## 2020-01-01 RX ORDER — TORSEMIDE 20 MG/1
20 TABLET ORAL DAILY
Qty: 90 TABLET | Refills: 3 | Status: SHIPPED | OUTPATIENT
Start: 2020-01-01 | End: 2020-01-01 | Stop reason: SDUPTHER

## 2020-01-01 RX ORDER — SODIUM CHLORIDE 0.9 % (FLUSH) 0.9 %
10 SYRINGE (ML) INJECTION PRN
Status: CANCELLED | OUTPATIENT
Start: 2020-01-01

## 2020-01-01 RX ORDER — SODIUM CHLORIDE 9 MG/ML
INJECTION, SOLUTION INTRAVENOUS PRN
Status: DISCONTINUED | OUTPATIENT
Start: 2020-01-01 | End: 2021-01-01 | Stop reason: HOSPADM

## 2020-01-01 RX ORDER — DEXAMETHASONE 4 MG/1
10 TABLET ORAL EVERY 12 HOURS SCHEDULED
Status: DISCONTINUED | OUTPATIENT
Start: 2020-01-01 | End: 2020-01-01

## 2020-01-01 RX ORDER — BLOOD SUGAR DIAGNOSTIC
1 STRIP MISCELLANEOUS 3 TIMES DAILY
Qty: 100 EACH | Refills: 5 | Status: SHIPPED | OUTPATIENT
Start: 2020-01-01 | End: 2020-01-01 | Stop reason: SDUPTHER

## 2020-01-01 RX ORDER — ATORVASTATIN CALCIUM 80 MG/1
TABLET, FILM COATED ORAL
Qty: 90 TABLET | Refills: 2 | Status: SHIPPED | OUTPATIENT
Start: 2020-01-01 | End: 2020-01-01 | Stop reason: SDUPTHER

## 2020-01-01 RX ORDER — LORAZEPAM 0.5 MG/1
0.5 TABLET ORAL 2 TIMES DAILY PRN
Status: DISCONTINUED | OUTPATIENT
Start: 2020-01-01 | End: 2021-01-01

## 2020-01-01 RX ORDER — CEFUROXIME AXETIL 250 MG/1
250 TABLET ORAL 2 TIMES DAILY
Qty: 20 TABLET | Refills: 0 | Status: SHIPPED | OUTPATIENT
Start: 2020-01-01 | End: 2020-01-01

## 2020-01-01 RX ORDER — METOPROLOL SUCCINATE 100 MG/1
TABLET, EXTENDED RELEASE ORAL
Qty: 7 TABLET | Refills: 0 | Status: SHIPPED | OUTPATIENT
Start: 2020-01-01 | End: 2020-01-01 | Stop reason: SDUPTHER

## 2020-01-01 RX ORDER — INSULIN GLARGINE 100 [IU]/ML
10 INJECTION, SOLUTION SUBCUTANEOUS ONCE
Status: COMPLETED | OUTPATIENT
Start: 2020-01-01 | End: 2020-01-01

## 2020-01-01 RX ORDER — BENZONATATE 100 MG/1
100 CAPSULE ORAL 3 TIMES DAILY PRN
Qty: 21 CAPSULE | Refills: 0 | Status: SHIPPED | OUTPATIENT
Start: 2020-01-01 | End: 2020-01-01

## 2020-01-01 RX ORDER — DIPHENHYDRAMINE HYDROCHLORIDE 50 MG/ML
50 INJECTION INTRAMUSCULAR; INTRAVENOUS ONCE
Status: CANCELLED | OUTPATIENT
Start: 2020-01-01

## 2020-01-01 RX ORDER — AMOXICILLIN 500 MG/1
500 CAPSULE ORAL 3 TIMES DAILY
Qty: 30 CAPSULE | Refills: 0 | Status: SHIPPED | OUTPATIENT
Start: 2020-01-01 | End: 2020-01-01

## 2020-01-01 RX ORDER — BRIMONIDINE TARTRATE 2 MG/ML
1 SOLUTION/ DROPS OPHTHALMIC SEE ADMIN INSTRUCTIONS
Status: CANCELLED | OUTPATIENT
Start: 2020-01-01

## 2020-01-01 RX ORDER — ACETAMINOPHEN 325 MG/1
650 TABLET ORAL EVERY 6 HOURS PRN
Status: DISCONTINUED | OUTPATIENT
Start: 2020-01-01 | End: 2020-01-01 | Stop reason: SDUPTHER

## 2020-01-01 RX ORDER — SPIRONOLACTONE 25 MG/1
25 TABLET ORAL DAILY
Status: DISCONTINUED | OUTPATIENT
Start: 2020-01-01 | End: 2021-01-01 | Stop reason: HOSPADM

## 2020-01-01 RX ORDER — 0.9 % SODIUM CHLORIDE 0.9 %
30 INTRAVENOUS SOLUTION INTRAVENOUS PRN
Status: DISCONTINUED | OUTPATIENT
Start: 2020-01-01 | End: 2021-01-01 | Stop reason: HOSPADM

## 2020-01-01 RX ORDER — ALPRAZOLAM 0.5 MG/1
TABLET ORAL
Qty: 90 TABLET | Refills: 0 | OUTPATIENT
Start: 2020-01-01 | End: 2020-01-01

## 2020-01-01 RX ORDER — ZINC SULFATE 50(220)MG
50 CAPSULE ORAL DAILY
Status: DISCONTINUED | OUTPATIENT
Start: 2020-01-01 | End: 2021-01-01 | Stop reason: HOSPADM

## 2020-01-01 RX ORDER — METOPROLOL SUCCINATE 50 MG/1
100 TABLET, EXTENDED RELEASE ORAL DAILY
Status: DISCONTINUED | OUTPATIENT
Start: 2020-01-01 | End: 2020-01-01

## 2020-01-01 RX ORDER — ACETAMINOPHEN 650 MG/1
650 SUPPOSITORY RECTAL EVERY 6 HOURS PRN
Status: DISCONTINUED | OUTPATIENT
Start: 2020-01-01 | End: 2020-01-01 | Stop reason: SDUPTHER

## 2020-01-01 RX ORDER — IVERMECTIN 3 MG/1
200 TABLET ORAL ONCE
Status: COMPLETED | OUTPATIENT
Start: 2020-01-01 | End: 2020-01-01

## 2020-01-01 RX ORDER — TRIAMCINOLONE ACETONIDE 40 MG/ML
INJECTION, SUSPENSION INTRA-ARTICULAR; INTRAMUSCULAR
Status: COMPLETED | OUTPATIENT
Start: 2020-01-01 | End: 2020-01-01

## 2020-01-01 RX ORDER — INSULIN GLARGINE 100 [IU]/ML
30 INJECTION, SOLUTION SUBCUTANEOUS 2 TIMES DAILY
Status: DISCONTINUED | OUTPATIENT
Start: 2020-01-01 | End: 2020-01-01

## 2020-01-01 RX ORDER — DEXAMETHASONE 4 MG/1
4 TABLET ORAL ONCE
Status: COMPLETED | OUTPATIENT
Start: 2020-01-01 | End: 2020-01-01

## 2020-01-01 RX ORDER — DOXYCYCLINE HYCLATE 100 MG
100 TABLET ORAL EVERY 12 HOURS SCHEDULED
Status: DISCONTINUED | OUTPATIENT
Start: 2020-01-01 | End: 2020-01-01

## 2020-01-01 RX ORDER — INSULIN GLARGINE 100 [IU]/ML
10 INJECTION, SOLUTION SUBCUTANEOUS 2 TIMES DAILY
Status: DISCONTINUED | OUTPATIENT
Start: 2020-01-01 | End: 2021-01-01 | Stop reason: HOSPADM

## 2020-01-01 RX ORDER — VITAMIN B COMPLEX
6000 TABLET ORAL DAILY
Status: DISCONTINUED | OUTPATIENT
Start: 2020-01-01 | End: 2020-01-01 | Stop reason: CLARIF

## 2020-01-01 RX ORDER — ATORVASTATIN CALCIUM 80 MG/1
TABLET, FILM COATED ORAL
Qty: 90 TABLET | Refills: 0 | Status: SHIPPED | OUTPATIENT
Start: 2020-01-01 | End: 2020-01-01

## 2020-01-01 RX ORDER — SODIUM CHLORIDE 0.9 % (FLUSH) 0.9 %
10 SYRINGE (ML) INJECTION PRN
Status: DISCONTINUED | OUTPATIENT
Start: 2020-01-01 | End: 2020-01-01 | Stop reason: SDUPTHER

## 2020-01-01 RX ORDER — METOPROLOL SUCCINATE 50 MG/1
50 TABLET, EXTENDED RELEASE ORAL ONCE
Status: COMPLETED | OUTPATIENT
Start: 2020-01-01 | End: 2020-01-01

## 2020-01-01 RX ORDER — DOXYCYCLINE HYCLATE 100 MG
100 TABLET ORAL EVERY 12 HOURS SCHEDULED
Status: DISCONTINUED | OUTPATIENT
Start: 2020-01-01 | End: 2021-01-01 | Stop reason: HOSPADM

## 2020-01-01 RX ORDER — PHENYLEPHRINE HYDROCHLORIDE 25 MG/ML
1 SOLUTION/ DROPS OPHTHALMIC SEE ADMIN INSTRUCTIONS
Status: CANCELLED | OUTPATIENT
Start: 2020-01-01

## 2020-01-01 RX ORDER — DEXAMETHASONE SODIUM PHOSPHATE 10 MG/ML
10 INJECTION, SOLUTION INTRAMUSCULAR; INTRAVENOUS EVERY 12 HOURS
Status: DISCONTINUED | OUTPATIENT
Start: 2020-01-01 | End: 2020-01-01

## 2020-01-01 RX ORDER — TORSEMIDE 20 MG/1
20 TABLET ORAL DAILY
Qty: 90 TABLET | Refills: 0 | Status: SHIPPED | OUTPATIENT
Start: 2020-01-01 | End: 2020-01-01 | Stop reason: SDUPTHER

## 2020-01-01 RX ORDER — GUAIFENESIN/DEXTROMETHORPHAN 100-10MG/5
10 SYRUP ORAL EVERY 4 HOURS PRN
Status: DISCONTINUED | OUTPATIENT
Start: 2020-01-01 | End: 2021-01-01 | Stop reason: HOSPADM

## 2020-01-01 RX ORDER — PREDNISONE 10 MG/1
TABLET ORAL
Qty: 16 TABLET | Refills: 0 | Status: SHIPPED | OUTPATIENT
Start: 2020-01-01 | End: 2020-01-01

## 2020-01-01 RX ORDER — DEXTROSE MONOHYDRATE 50 MG/ML
100 INJECTION, SOLUTION INTRAVENOUS PRN
Status: DISCONTINUED | OUTPATIENT
Start: 2020-01-01 | End: 2021-01-01 | Stop reason: HOSPADM

## 2020-01-01 RX ORDER — SODIUM CHLORIDE 0.9 % (FLUSH) 0.9 %
10 SYRINGE (ML) INJECTION PRN
Status: DISCONTINUED | OUTPATIENT
Start: 2020-01-01 | End: 2020-01-01 | Stop reason: HOSPADM

## 2020-01-01 RX ORDER — INSULIN GLARGINE 100 [IU]/ML
10 INJECTION, SOLUTION SUBCUTANEOUS 2 TIMES DAILY
Status: DISCONTINUED | OUTPATIENT
Start: 2020-01-01 | End: 2020-01-01

## 2020-01-01 RX ORDER — SPIRONOLACTONE 25 MG/1
TABLET ORAL
Qty: 90 TABLET | Refills: 0 | Status: SHIPPED | OUTPATIENT
Start: 2020-01-01 | End: 2020-01-01

## 2020-01-01 RX ORDER — ALBUTEROL SULFATE 90 UG/1
2 AEROSOL, METERED RESPIRATORY (INHALATION) EVERY 6 HOURS PRN
Status: DISCONTINUED | OUTPATIENT
Start: 2020-01-01 | End: 2021-01-01 | Stop reason: HOSPADM

## 2020-01-01 RX ORDER — TETRACAINE HYDROCHLORIDE 5 MG/ML
SOLUTION OPHTHALMIC
Status: COMPLETED | OUTPATIENT
Start: 2020-01-01 | End: 2020-01-01

## 2020-01-01 RX ORDER — PHENYLEPHRINE HCL 2.5 %
1 DROPS OPHTHALMIC (EYE) SEE ADMIN INSTRUCTIONS
Status: COMPLETED | OUTPATIENT
Start: 2020-01-01 | End: 2020-01-01

## 2020-01-01 RX ORDER — METOPROLOL SUCCINATE 100 MG/1
TABLET, EXTENDED RELEASE ORAL
Qty: 90 TABLET | Refills: 2 | Status: SHIPPED | OUTPATIENT
Start: 2020-01-01 | End: 2020-01-01 | Stop reason: SDUPTHER

## 2020-01-01 RX ORDER — PREDNISOLONE ACETATE 10 MG/ML
1 SUSPENSION/ DROPS OPHTHALMIC SEE ADMIN INSTRUCTIONS
Status: COMPLETED | OUTPATIENT
Start: 2020-01-01 | End: 2020-01-01

## 2020-01-01 RX ORDER — PREDNISOLONE ACETATE 10 MG/ML
1 SUSPENSION/ DROPS OPHTHALMIC SEE ADMIN INSTRUCTIONS
Status: CANCELLED | OUTPATIENT
Start: 2020-01-01

## 2020-01-01 RX ORDER — SODIUM CHLORIDE 0.9 % (FLUSH) 0.9 %
10 SYRINGE (ML) INJECTION EVERY 12 HOURS SCHEDULED
Status: DISCONTINUED | OUTPATIENT
Start: 2020-01-01 | End: 2020-01-01 | Stop reason: SDUPTHER

## 2020-01-01 RX ORDER — BALANCED SALT SOLUTION 6.4; .75; .48; .3; 3.9; 1.7 MG/ML; MG/ML; MG/ML; MG/ML; MG/ML; MG/ML
SOLUTION OPHTHALMIC
Status: COMPLETED | OUTPATIENT
Start: 2020-01-01 | End: 2020-01-01

## 2020-01-01 RX ORDER — SODIUM CHLORIDE 9 MG/ML
INJECTION, SOLUTION INTRAVENOUS CONTINUOUS
Status: DISCONTINUED | OUTPATIENT
Start: 2020-01-01 | End: 2020-01-01 | Stop reason: HOSPADM

## 2020-01-01 RX ORDER — ACETAMINOPHEN 650 MG/1
650 SUPPOSITORY RECTAL EVERY 6 HOURS PRN
Status: DISCONTINUED | OUTPATIENT
Start: 2020-01-01 | End: 2021-01-01 | Stop reason: HOSPADM

## 2020-01-01 RX ORDER — 0.9 % SODIUM CHLORIDE 0.9 %
500 INTRAVENOUS SOLUTION INTRAVENOUS ONCE
Status: COMPLETED | OUTPATIENT
Start: 2020-01-01 | End: 2020-01-01

## 2020-01-01 RX ORDER — TORSEMIDE 20 MG/1
20 TABLET ORAL DAILY
Qty: 90 TABLET | Refills: 0 | Status: SHIPPED | OUTPATIENT
Start: 2020-01-01 | End: 2020-01-01

## 2020-01-01 RX ORDER — DEXTROSE MONOHYDRATE 25 G/50ML
12.5 INJECTION, SOLUTION INTRAVENOUS PRN
Status: DISCONTINUED | OUTPATIENT
Start: 2020-01-01 | End: 2021-01-01 | Stop reason: HOSPADM

## 2020-01-01 RX ORDER — NICOTINE POLACRILEX 4 MG
15 LOZENGE BUCCAL PRN
Status: DISCONTINUED | OUTPATIENT
Start: 2020-01-01 | End: 2021-01-01 | Stop reason: HOSPADM

## 2020-01-01 RX ORDER — BRIMONIDINE TARTRATE 2 MG/ML
1 SOLUTION/ DROPS OPHTHALMIC SEE ADMIN INSTRUCTIONS
Status: COMPLETED | OUTPATIENT
Start: 2020-01-01 | End: 2020-01-01

## 2020-01-01 RX ORDER — FUROSEMIDE 10 MG/ML
60 INJECTION INTRAMUSCULAR; INTRAVENOUS ONCE
Status: CANCELLED
Start: 2020-01-01

## 2020-01-01 RX ORDER — ONDANSETRON 2 MG/ML
4 INJECTION INTRAMUSCULAR; INTRAVENOUS EVERY 6 HOURS PRN
Status: DISCONTINUED | OUTPATIENT
Start: 2020-01-01 | End: 2021-01-01 | Stop reason: HOSPADM

## 2020-01-01 RX ORDER — TROPICAMIDE 10 MG/ML
1 SOLUTION/ DROPS OPHTHALMIC SEE ADMIN INSTRUCTIONS
Status: COMPLETED | OUTPATIENT
Start: 2020-01-01 | End: 2020-01-01

## 2020-01-01 RX ORDER — METOPROLOL SUCCINATE 50 MG/1
50 TABLET, EXTENDED RELEASE ORAL 2 TIMES DAILY
Status: DISCONTINUED | OUTPATIENT
Start: 2020-01-01 | End: 2021-01-01 | Stop reason: HOSPADM

## 2020-01-01 RX ORDER — ALPRAZOLAM 0.5 MG/1
TABLET ORAL
Qty: 90 TABLET | Refills: 0 | Status: SHIPPED | OUTPATIENT
Start: 2020-01-01 | End: 2021-03-17

## 2020-01-01 RX ORDER — METHYLPREDNISOLONE SODIUM SUCCINATE 125 MG/2ML
125 INJECTION, POWDER, LYOPHILIZED, FOR SOLUTION INTRAMUSCULAR; INTRAVENOUS ONCE
Status: CANCELLED | OUTPATIENT
Start: 2020-01-01

## 2020-01-01 RX ORDER — MIDAZOLAM HYDROCHLORIDE 1 MG/ML
INJECTION INTRAMUSCULAR; INTRAVENOUS PRN
Status: DISCONTINUED | OUTPATIENT
Start: 2020-01-01 | End: 2020-01-01 | Stop reason: SDUPTHER

## 2020-01-01 RX ORDER — SODIUM CHLORIDE 0.9 % (FLUSH) 0.9 %
10 SYRINGE (ML) INJECTION PRN
Status: DISCONTINUED | OUTPATIENT
Start: 2020-01-01 | End: 2021-01-01 | Stop reason: HOSPADM

## 2020-01-01 RX ORDER — ATORVASTATIN CALCIUM 80 MG/1
TABLET, FILM COATED ORAL
Qty: 90 TABLET | Refills: 0 | Status: SHIPPED | OUTPATIENT
Start: 2020-01-01 | End: 2020-01-01 | Stop reason: SDUPTHER

## 2020-01-01 RX ORDER — METOPROLOL SUCCINATE 100 MG/1
TABLET, EXTENDED RELEASE ORAL
Qty: 90 TABLET | Refills: 0 | Status: SHIPPED | OUTPATIENT
Start: 2020-01-01 | End: 2020-01-01 | Stop reason: SDUPTHER

## 2020-01-01 RX ORDER — ACETAMINOPHEN 500 MG
1000 TABLET ORAL ONCE
Status: COMPLETED | OUTPATIENT
Start: 2020-01-01 | End: 2020-01-01

## 2020-01-01 RX ORDER — ACETAMINOPHEN 325 MG/1
650 TABLET ORAL EVERY 6 HOURS PRN
Status: DISCONTINUED | OUTPATIENT
Start: 2020-01-01 | End: 2021-01-01 | Stop reason: HOSPADM

## 2020-01-01 RX ORDER — GABAPENTIN 100 MG/1
100 CAPSULE ORAL NIGHTLY
Status: DISCONTINUED | OUTPATIENT
Start: 2020-01-01 | End: 2021-01-01 | Stop reason: HOSPADM

## 2020-01-01 RX ORDER — METOPROLOL SUCCINATE 50 MG/1
50 TABLET, EXTENDED RELEASE ORAL DAILY
Status: DISCONTINUED | OUTPATIENT
Start: 2020-01-01 | End: 2020-01-01

## 2020-01-01 RX ORDER — DEXAMETHASONE SODIUM PHOSPHATE 10 MG/ML
10 INJECTION, SOLUTION INTRAMUSCULAR; INTRAVENOUS EVERY 24 HOURS
Status: DISCONTINUED | OUTPATIENT
Start: 2020-01-01 | End: 2021-01-01 | Stop reason: HOSPADM

## 2020-01-01 RX ORDER — CEPHALEXIN 250 MG/1
250 CAPSULE ORAL 4 TIMES DAILY
Qty: 28 CAPSULE | Refills: 0 | Status: SHIPPED | OUTPATIENT
Start: 2020-01-01 | End: 2020-01-01

## 2020-01-01 RX ORDER — BRIMONIDINE TARTRATE 0.15 %
DROPS OPHTHALMIC (EYE)
Status: COMPLETED | OUTPATIENT
Start: 2020-01-01 | End: 2020-01-01

## 2020-01-01 RX ORDER — SODIUM CHLORIDE 0.9 % (FLUSH) 0.9 %
10 SYRINGE (ML) INJECTION EVERY 12 HOURS SCHEDULED
Status: DISCONTINUED | OUTPATIENT
Start: 2020-01-01 | End: 2020-01-01 | Stop reason: HOSPADM

## 2020-01-01 RX ORDER — POLYETHYLENE GLYCOL 3350 17 G/17G
17 POWDER, FOR SOLUTION ORAL DAILY PRN
Status: DISCONTINUED | OUTPATIENT
Start: 2020-01-01 | End: 2021-01-01 | Stop reason: HOSPADM

## 2020-01-01 RX ORDER — SPIRONOLACTONE 25 MG/1
TABLET ORAL
Qty: 90 TABLET | Refills: 2 | Status: SHIPPED | OUTPATIENT
Start: 2020-01-01 | End: 2020-01-01 | Stop reason: SDUPTHER

## 2020-01-01 RX ORDER — DEXAMETHASONE SODIUM PHOSPHATE 10 MG/ML
10 INJECTION, SOLUTION INTRAMUSCULAR; INTRAVENOUS ONCE
Status: COMPLETED | OUTPATIENT
Start: 2020-01-01 | End: 2020-01-01

## 2020-01-01 RX ORDER — SPIRONOLACTONE 25 MG/1
TABLET ORAL
Qty: 90 TABLET | Refills: 0 | Status: SHIPPED | OUTPATIENT
Start: 2020-01-01 | End: 2020-01-01 | Stop reason: SDUPTHER

## 2020-01-01 RX ORDER — TROPICAMIDE 10 MG/ML
1 SOLUTION/ DROPS OPHTHALMIC SEE ADMIN INSTRUCTIONS
Status: CANCELLED | OUTPATIENT
Start: 2020-01-01

## 2020-01-01 RX ORDER — NEOMYCIN SULFATE, POLYMYXIN B SULFATE, BACITRACIN ZINC, HYDROCORTISONE 3.5; 10000; 400; 1 MG/G; [USP'U]/G; [USP'U]/G; MG/G
OINTMENT OPHTHALMIC
Status: COMPLETED | OUTPATIENT
Start: 2020-01-01 | End: 2020-01-01

## 2020-01-01 RX ADMIN — POVIDONE-IODINE 0.1 ML: 5 SOLUTION OPHTHALMIC at 07:50

## 2020-01-01 RX ADMIN — INSULIN LISPRO 2 UNITS: 100 INJECTION, SOLUTION INTRAVENOUS; SUBCUTANEOUS at 22:25

## 2020-01-01 RX ADMIN — INSULIN GLARGINE 10 UNITS: 100 INJECTION, SOLUTION SUBCUTANEOUS at 09:29

## 2020-01-01 RX ADMIN — INSULIN LISPRO 2 UNITS: 100 INJECTION, SOLUTION INTRAVENOUS; SUBCUTANEOUS at 12:45

## 2020-01-01 RX ADMIN — METOPROLOL SUCCINATE 100 MG: 50 TABLET, EXTENDED RELEASE ORAL at 07:57

## 2020-01-01 RX ADMIN — ALPRAZOLAM 0.5 MG: 0.5 TABLET ORAL at 22:15

## 2020-01-01 RX ADMIN — SPIRONOLACTONE 25 MG: 25 TABLET ORAL at 08:31

## 2020-01-01 RX ADMIN — DOXYCYCLINE 100 MG: 100 INJECTION, POWDER, LYOPHILIZED, FOR SOLUTION INTRAVENOUS at 00:45

## 2020-01-01 RX ADMIN — ATORVASTATIN CALCIUM 80 MG: 80 TABLET, FILM COATED ORAL at 20:19

## 2020-01-01 RX ADMIN — SODIUM CHLORIDE, PRESERVATIVE FREE 10 ML: 5 INJECTION INTRAVENOUS at 13:49

## 2020-01-01 RX ADMIN — SPIRONOLACTONE 25 MG: 25 TABLET ORAL at 16:13

## 2020-01-01 RX ADMIN — METOPROLOL SUCCINATE 50 MG: 50 TABLET, EXTENDED RELEASE ORAL at 10:02

## 2020-01-01 RX ADMIN — INSULIN LISPRO 4 UNITS: 100 INJECTION, SOLUTION INTRAVENOUS; SUBCUTANEOUS at 00:30

## 2020-01-01 RX ADMIN — GABAPENTIN 100 MG: 100 CAPSULE ORAL at 20:51

## 2020-01-01 RX ADMIN — APIXABAN 5 MG: 5 TABLET, FILM COATED ORAL at 21:06

## 2020-01-01 RX ADMIN — REMDESIVIR 100 MG: 100 INJECTION, POWDER, LYOPHILIZED, FOR SOLUTION INTRAVENOUS at 14:00

## 2020-01-01 RX ADMIN — APIXABAN 5 MG: 5 TABLET, FILM COATED ORAL at 23:29

## 2020-01-01 RX ADMIN — APIXABAN 5 MG: 5 TABLET, FILM COATED ORAL at 20:49

## 2020-01-01 RX ADMIN — DOXYCYCLINE HYCLATE 100 MG: 100 TABLET, COATED ORAL at 23:15

## 2020-01-01 RX ADMIN — ATORVASTATIN CALCIUM 80 MG: 80 TABLET, FILM COATED ORAL at 23:15

## 2020-01-01 RX ADMIN — GUAIFENESIN AND DEXTROMETHORPHAN 10 ML: 100; 10 SYRUP ORAL at 23:32

## 2020-01-01 RX ADMIN — SODIUM CHLORIDE, PRESERVATIVE FREE 10 ML: 5 INJECTION INTRAVENOUS at 10:02

## 2020-01-01 RX ADMIN — ZINC SULFATE 220 MG (50 MG) CAPSULE 50 MG: CAPSULE at 10:02

## 2020-01-01 RX ADMIN — INSULIN LISPRO 10 UNITS: 100 INJECTION, SOLUTION INTRAVENOUS; SUBCUTANEOUS at 00:08

## 2020-01-01 RX ADMIN — DEXAMETHASONE SODIUM PHOSPHATE 10 MG: 10 INJECTION, SOLUTION INTRAMUSCULAR; INTRAVENOUS at 13:48

## 2020-01-01 RX ADMIN — INSULIN LISPRO 8 UNITS: 100 INJECTION, SOLUTION INTRAVENOUS; SUBCUTANEOUS at 18:04

## 2020-01-01 RX ADMIN — SACUBITRIL AND VALSARTAN 1 TABLET: 24; 26 TABLET, FILM COATED ORAL at 08:30

## 2020-01-01 RX ADMIN — SODIUM CHLORIDE 500 ML: 9 INJECTION, SOLUTION INTRAVENOUS at 11:11

## 2020-01-01 RX ADMIN — DOXYCYCLINE HYCLATE 100 MG: 100 TABLET, COATED ORAL at 21:47

## 2020-01-01 RX ADMIN — IVERMECTIN 22.5 MG: 3 TABLET ORAL at 21:40

## 2020-01-01 RX ADMIN — ZINC SULFATE 220 MG (50 MG) CAPSULE 50 MG: CAPSULE at 08:30

## 2020-01-01 RX ADMIN — SODIUM CHLORIDE, PRESERVATIVE FREE 10 ML: 5 INJECTION INTRAVENOUS at 21:44

## 2020-01-01 RX ADMIN — BENZONATATE 100 MG: 100 CAPSULE ORAL at 20:19

## 2020-01-01 RX ADMIN — BENZONATATE 100 MG: 100 CAPSULE ORAL at 08:31

## 2020-01-01 RX ADMIN — ATORVASTATIN CALCIUM 80 MG: 80 TABLET, FILM COATED ORAL at 20:51

## 2020-01-01 RX ADMIN — ATORVASTATIN CALCIUM 80 MG: 80 TABLET, FILM COATED ORAL at 20:26

## 2020-01-01 RX ADMIN — INSULIN LISPRO 2 UNITS: 100 INJECTION, SOLUTION INTRAVENOUS; SUBCUTANEOUS at 00:48

## 2020-01-01 RX ADMIN — INSULIN LISPRO 4 UNITS: 100 INJECTION, SOLUTION INTRAVENOUS; SUBCUTANEOUS at 16:55

## 2020-01-01 RX ADMIN — GABAPENTIN 100 MG: 100 CAPSULE ORAL at 21:31

## 2020-01-01 RX ADMIN — APIXABAN 5 MG: 5 TABLET, FILM COATED ORAL at 21:39

## 2020-01-01 RX ADMIN — ZINC SULFATE 220 MG (50 MG) CAPSULE 50 MG: CAPSULE at 08:57

## 2020-01-01 RX ADMIN — BENZONATATE 100 MG: 100 CAPSULE ORAL at 22:32

## 2020-01-01 RX ADMIN — ASPIRIN 81 MG: 81 TABLET, CHEWABLE ORAL at 07:57

## 2020-01-01 RX ADMIN — INSULIN LISPRO 12 UNITS: 100 INJECTION, SOLUTION INTRAVENOUS; SUBCUTANEOUS at 11:46

## 2020-01-01 RX ADMIN — SODIUM CHLORIDE, PRESERVATIVE FREE 10 ML: 5 INJECTION INTRAVENOUS at 21:39

## 2020-01-01 RX ADMIN — SODIUM CHLORIDE, PRESERVATIVE FREE 10 ML: 5 INJECTION INTRAVENOUS at 09:51

## 2020-01-01 RX ADMIN — ALPRAZOLAM 0.5 MG: 0.5 TABLET ORAL at 22:32

## 2020-01-01 RX ADMIN — DEXAMETHASONE 4 MG: 4 TABLET ORAL at 12:10

## 2020-01-01 RX ADMIN — ZINC SULFATE 220 MG (50 MG) CAPSULE 50 MG: CAPSULE at 08:20

## 2020-01-01 RX ADMIN — APIXABAN 5 MG: 5 TABLET, FILM COATED ORAL at 21:16

## 2020-01-01 RX ADMIN — ZINC SULFATE 220 MG (50 MG) CAPSULE 50 MG: CAPSULE at 11:47

## 2020-01-01 RX ADMIN — APIXABAN 5 MG: 5 TABLET, FILM COATED ORAL at 20:19

## 2020-01-01 RX ADMIN — Medication 6000 UNITS: at 08:15

## 2020-01-01 RX ADMIN — DOXYCYCLINE HYCLATE 100 MG: 100 TABLET, COATED ORAL at 08:17

## 2020-01-01 RX ADMIN — DEXAMETHASONE SODIUM PHOSPHATE 10 MG: 10 INJECTION, SOLUTION INTRAMUSCULAR; INTRAVENOUS at 00:45

## 2020-01-01 RX ADMIN — INSULIN GLARGINE 10 UNITS: 100 INJECTION, SOLUTION SUBCUTANEOUS at 09:00

## 2020-01-01 RX ADMIN — ZINC SULFATE 220 MG (50 MG) CAPSULE 50 MG: CAPSULE at 17:03

## 2020-01-01 RX ADMIN — INSULIN LISPRO 4 UNITS: 100 INJECTION, SOLUTION INTRAVENOUS; SUBCUTANEOUS at 00:58

## 2020-01-01 RX ADMIN — INSULIN GLARGINE 10 UNITS: 100 INJECTION, SOLUTION SUBCUTANEOUS at 11:46

## 2020-01-01 RX ADMIN — INSULIN LISPRO 6 UNITS: 100 INJECTION, SOLUTION INTRAVENOUS; SUBCUTANEOUS at 09:00

## 2020-01-01 RX ADMIN — INSULIN LISPRO 2 UNITS: 100 INJECTION, SOLUTION INTRAVENOUS; SUBCUTANEOUS at 16:45

## 2020-01-01 RX ADMIN — INSULIN LISPRO 3 UNITS: 100 INJECTION, SOLUTION INTRAVENOUS; SUBCUTANEOUS at 21:06

## 2020-01-01 RX ADMIN — INSULIN LISPRO 8 UNITS: 100 INJECTION, SOLUTION INTRAVENOUS; SUBCUTANEOUS at 20:50

## 2020-01-01 RX ADMIN — DOXYCYCLINE 100 MG: 100 INJECTION, POWDER, LYOPHILIZED, FOR SOLUTION INTRAVENOUS at 12:02

## 2020-01-01 RX ADMIN — INSULIN LISPRO 4 UNITS: 100 INJECTION, SOLUTION INTRAVENOUS; SUBCUTANEOUS at 12:22

## 2020-01-01 RX ADMIN — APIXABAN 5 MG: 5 TABLET, FILM COATED ORAL at 08:31

## 2020-01-01 RX ADMIN — SODIUM CHLORIDE, PRESERVATIVE FREE 10 ML: 5 INJECTION INTRAVENOUS at 08:57

## 2020-01-01 RX ADMIN — ZINC SULFATE 220 MG (50 MG) CAPSULE 50 MG: CAPSULE at 07:57

## 2020-01-01 RX ADMIN — GABAPENTIN 100 MG: 100 CAPSULE ORAL at 22:10

## 2020-01-01 RX ADMIN — PROMETHAZINE HYDROCHLORIDE 12.5 MG: 25 TABLET ORAL at 23:15

## 2020-01-01 RX ADMIN — METOPROLOL SUCCINATE 50 MG: 50 TABLET, EXTENDED RELEASE ORAL at 08:32

## 2020-01-01 RX ADMIN — ALPRAZOLAM 0.5 MG: 0.5 TABLET ORAL at 20:19

## 2020-01-01 RX ADMIN — Medication 3 ML: at 08:00

## 2020-01-01 RX ADMIN — INSULIN GLARGINE 10 UNITS: 100 INJECTION, SOLUTION SUBCUTANEOUS at 09:21

## 2020-01-01 RX ADMIN — ALPRAZOLAM 0.5 MG: 0.5 TABLET ORAL at 18:41

## 2020-01-01 RX ADMIN — INSULIN GLARGINE 10 UNITS: 100 INJECTION, SOLUTION SUBCUTANEOUS at 22:33

## 2020-01-01 RX ADMIN — SACUBITRIL AND VALSARTAN 1 TABLET: 24; 26 TABLET, FILM COATED ORAL at 21:06

## 2020-01-01 RX ADMIN — SODIUM CHLORIDE, PRESERVATIVE FREE 10 ML: 5 INJECTION INTRAVENOUS at 09:21

## 2020-01-01 RX ADMIN — DOXYCYCLINE 100 MG: 100 INJECTION, POWDER, LYOPHILIZED, FOR SOLUTION INTRAVENOUS at 00:30

## 2020-01-01 RX ADMIN — METOPROLOL SUCCINATE 50 MG: 50 TABLET, EXTENDED RELEASE ORAL at 11:47

## 2020-01-01 RX ADMIN — SACUBITRIL AND VALSARTAN 1 TABLET: 24; 26 TABLET, FILM COATED ORAL at 21:39

## 2020-01-01 RX ADMIN — DEXAMETHASONE 10 MG: 4 TABLET ORAL at 08:16

## 2020-01-01 RX ADMIN — SPIRONOLACTONE 25 MG: 25 TABLET ORAL at 09:52

## 2020-01-01 RX ADMIN — GABAPENTIN 100 MG: 100 CAPSULE ORAL at 21:16

## 2020-01-01 RX ADMIN — ALPRAZOLAM 0.5 MG: 0.5 TABLET ORAL at 21:59

## 2020-01-01 RX ADMIN — APIXABAN 5 MG: 5 TABLET, FILM COATED ORAL at 08:32

## 2020-01-01 RX ADMIN — SPIRONOLACTONE 25 MG: 25 TABLET ORAL at 08:56

## 2020-01-01 RX ADMIN — INSULIN LISPRO 8 UNITS: 100 INJECTION, SOLUTION INTRAVENOUS; SUBCUTANEOUS at 09:17

## 2020-01-01 RX ADMIN — DOXYCYCLINE 100 MG: 100 INJECTION, POWDER, LYOPHILIZED, FOR SOLUTION INTRAVENOUS at 16:00

## 2020-01-01 RX ADMIN — SODIUM CHLORIDE, PRESERVATIVE FREE 10 ML: 5 INJECTION INTRAVENOUS at 20:19

## 2020-01-01 RX ADMIN — ACETAMINOPHEN 1000 MG: 500 TABLET ORAL at 11:05

## 2020-01-01 RX ADMIN — INSULIN LISPRO 5 UNITS: 100 INJECTION, SOLUTION INTRAVENOUS; SUBCUTANEOUS at 17:04

## 2020-01-01 RX ADMIN — INSULIN LISPRO 6 UNITS: 100 INJECTION, SOLUTION INTRAVENOUS; SUBCUTANEOUS at 03:50

## 2020-01-01 RX ADMIN — INSULIN LISPRO 6 UNITS: 100 INJECTION, SOLUTION INTRAVENOUS; SUBCUTANEOUS at 17:04

## 2020-01-01 RX ADMIN — INSULIN GLARGINE 20 UNITS: 100 INJECTION, SOLUTION SUBCUTANEOUS at 09:17

## 2020-01-01 RX ADMIN — INSULIN LISPRO 6 UNITS: 100 INJECTION, SOLUTION INTRAVENOUS; SUBCUTANEOUS at 11:46

## 2020-01-01 RX ADMIN — INSULIN GLARGINE 10 UNITS: 100 INJECTION, SOLUTION SUBCUTANEOUS at 09:45

## 2020-01-01 RX ADMIN — ACETAMINOPHEN 650 MG: 325 TABLET ORAL at 07:56

## 2020-01-01 RX ADMIN — METOPROLOL SUCCINATE 100 MG: 50 TABLET, EXTENDED RELEASE ORAL at 08:31

## 2020-01-01 RX ADMIN — DOXYCYCLINE 100 MG: 100 INJECTION, POWDER, LYOPHILIZED, FOR SOLUTION INTRAVENOUS at 01:02

## 2020-01-01 RX ADMIN — DEXAMETHASONE 10 MG: 4 TABLET ORAL at 21:30

## 2020-01-01 RX ADMIN — Medication 6000 UNITS: at 09:38

## 2020-01-01 RX ADMIN — METOPROLOL SUCCINATE 50 MG: 50 TABLET, EXTENDED RELEASE ORAL at 21:39

## 2020-01-01 RX ADMIN — ALPRAZOLAM 0.5 MG: 0.5 TABLET ORAL at 21:16

## 2020-01-01 RX ADMIN — INSULIN LISPRO 4 UNITS: 100 INJECTION, SOLUTION INTRAVENOUS; SUBCUTANEOUS at 09:00

## 2020-01-01 RX ADMIN — INSULIN LISPRO 6 UNITS: 100 INJECTION, SOLUTION INTRAVENOUS; SUBCUTANEOUS at 22:11

## 2020-01-01 RX ADMIN — ACETAMINOPHEN 650 MG: 325 TABLET ORAL at 21:59

## 2020-01-01 RX ADMIN — DEXAMETHASONE SODIUM PHOSPHATE 10 MG: 10 INJECTION, SOLUTION INTRAMUSCULAR; INTRAVENOUS at 00:08

## 2020-01-01 RX ADMIN — SODIUM CHLORIDE: 0.9 INJECTION, SOLUTION INTRAVENOUS at 07:59

## 2020-01-01 RX ADMIN — APIXABAN 5 MG: 5 TABLET, FILM COATED ORAL at 21:30

## 2020-01-01 RX ADMIN — SPIRONOLACTONE 25 MG: 25 TABLET ORAL at 08:32

## 2020-01-01 RX ADMIN — INSULIN LISPRO 2 UNITS: 100 INJECTION, SOLUTION INTRAVENOUS; SUBCUTANEOUS at 09:45

## 2020-01-01 RX ADMIN — INSULIN LISPRO 2 UNITS: 100 INJECTION, SOLUTION INTRAVENOUS; SUBCUTANEOUS at 20:26

## 2020-01-01 RX ADMIN — PHENYLEPHRINE HYDROCHLORIDE 1 DROP: 25 SOLUTION/ DROPS OPHTHALMIC at 13:48

## 2020-01-01 RX ADMIN — SACUBITRIL AND VALSARTAN 1 TABLET: 24; 26 TABLET, FILM COATED ORAL at 07:57

## 2020-01-01 RX ADMIN — ACETAMINOPHEN 650 MG: 325 TABLET ORAL at 11:02

## 2020-01-01 RX ADMIN — INSULIN LISPRO 6 UNITS: 100 INJECTION, SOLUTION INTRAVENOUS; SUBCUTANEOUS at 16:45

## 2020-01-01 RX ADMIN — DEXAMETHASONE SODIUM PHOSPHATE 10 MG: 10 INJECTION, SOLUTION INTRAMUSCULAR; INTRAVENOUS at 13:39

## 2020-01-01 RX ADMIN — FUROSEMIDE 7.5 MG/HR: 10 INJECTION, SOLUTION INTRAMUSCULAR; INTRAVENOUS at 05:05

## 2020-01-01 RX ADMIN — DEXAMETHASONE SODIUM PHOSPHATE 10 MG: 10 INJECTION, SOLUTION INTRAMUSCULAR; INTRAVENOUS at 13:06

## 2020-01-01 RX ADMIN — MIDAZOLAM 1 MG: 1 INJECTION INTRAMUSCULAR; INTRAVENOUS at 08:33

## 2020-01-01 RX ADMIN — METOPROLOL SUCCINATE 50 MG: 50 TABLET, EXTENDED RELEASE ORAL at 08:56

## 2020-01-01 RX ADMIN — INSULIN LISPRO 6 UNITS: 100 INJECTION, SOLUTION INTRAVENOUS; SUBCUTANEOUS at 09:17

## 2020-01-01 RX ADMIN — DEXAMETHASONE 6 MG: 6 TABLET ORAL at 08:30

## 2020-01-01 RX ADMIN — INSULIN LISPRO 4 UNITS: 100 INJECTION, SOLUTION INTRAVENOUS; SUBCUTANEOUS at 04:45

## 2020-01-01 RX ADMIN — FUROSEMIDE 5 MG/HR: 10 INJECTION, SOLUTION INTRAMUSCULAR; INTRAVENOUS at 18:15

## 2020-01-01 RX ADMIN — DEXAMETHASONE SODIUM PHOSPHATE 10 MG: 10 INJECTION, SOLUTION INTRAMUSCULAR; INTRAVENOUS at 01:02

## 2020-01-01 RX ADMIN — REMDESIVIR 200 MG: 100 INJECTION, POWDER, LYOPHILIZED, FOR SOLUTION INTRAVENOUS at 15:30

## 2020-01-01 RX ADMIN — REMDESIVIR 100 MG: 100 INJECTION, POWDER, LYOPHILIZED, FOR SOLUTION INTRAVENOUS at 17:03

## 2020-01-01 RX ADMIN — APIXABAN 5 MG: 5 TABLET, FILM COATED ORAL at 08:16

## 2020-01-01 RX ADMIN — Medication 6000 UNITS: at 17:03

## 2020-01-01 RX ADMIN — GABAPENTIN 100 MG: 100 CAPSULE ORAL at 20:26

## 2020-01-01 RX ADMIN — ALPRAZOLAM 0.5 MG: 0.5 TABLET ORAL at 20:51

## 2020-01-01 RX ADMIN — ACETAMINOPHEN 650 MG: 325 TABLET ORAL at 18:40

## 2020-01-01 RX ADMIN — INSULIN LISPRO 2 UNITS: 100 INJECTION, SOLUTION INTRAVENOUS; SUBCUTANEOUS at 09:00

## 2020-01-01 RX ADMIN — ATORVASTATIN CALCIUM 80 MG: 80 TABLET, FILM COATED ORAL at 21:39

## 2020-01-01 RX ADMIN — REMDESIVIR 100 MG: 100 INJECTION, POWDER, LYOPHILIZED, FOR SOLUTION INTRAVENOUS at 15:35

## 2020-01-01 RX ADMIN — DOXYCYCLINE HYCLATE 100 MG: 100 TABLET, COATED ORAL at 08:56

## 2020-01-01 RX ADMIN — SACUBITRIL AND VALSARTAN 1 TABLET: 24; 26 TABLET, FILM COATED ORAL at 20:26

## 2020-01-01 RX ADMIN — APIXABAN 5 MG: 5 TABLET, FILM COATED ORAL at 07:57

## 2020-01-01 RX ADMIN — ATORVASTATIN CALCIUM 80 MG: 80 TABLET, FILM COATED ORAL at 20:49

## 2020-01-01 RX ADMIN — APIXABAN 5 MG: 5 TABLET, FILM COATED ORAL at 11:47

## 2020-01-01 RX ADMIN — METOPROLOL SUCCINATE 50 MG: 50 TABLET, EXTENDED RELEASE ORAL at 20:26

## 2020-01-01 RX ADMIN — INSULIN LISPRO 4 UNITS: 100 INJECTION, SOLUTION INTRAVENOUS; SUBCUTANEOUS at 05:13

## 2020-01-01 RX ADMIN — Medication 3 ML: at 07:50

## 2020-01-01 RX ADMIN — DOXYCYCLINE HYCLATE 100 MG: 100 TABLET, COATED ORAL at 20:49

## 2020-01-01 RX ADMIN — Medication 6000 UNITS: at 08:31

## 2020-01-01 RX ADMIN — PREDNISOLONE ACETATE 1 DROP: 10 SUSPENSION/ DROPS OPHTHALMIC at 14:30

## 2020-01-01 RX ADMIN — ACETAMINOPHEN 650 MG: 325 TABLET ORAL at 20:59

## 2020-01-01 RX ADMIN — INSULIN LISPRO 8 UNITS: 100 INJECTION, SOLUTION INTRAVENOUS; SUBCUTANEOUS at 21:47

## 2020-01-01 RX ADMIN — INSULIN GLARGINE 10 UNITS: 100 INJECTION, SOLUTION SUBCUTANEOUS at 20:26

## 2020-01-01 RX ADMIN — INSULIN LISPRO 4 UNITS: 100 INJECTION, SOLUTION INTRAVENOUS; SUBCUTANEOUS at 18:21

## 2020-01-01 RX ADMIN — SACUBITRIL AND VALSARTAN 1 TABLET: 24; 26 TABLET, FILM COATED ORAL at 08:56

## 2020-01-01 RX ADMIN — GABAPENTIN 100 MG: 100 CAPSULE ORAL at 20:19

## 2020-01-01 RX ADMIN — INSULIN GLARGINE 10 UNITS: 100 INJECTION, SOLUTION SUBCUTANEOUS at 20:50

## 2020-01-01 RX ADMIN — SODIUM CHLORIDE, PRESERVATIVE FREE 10 ML: 5 INJECTION INTRAVENOUS at 08:33

## 2020-01-01 RX ADMIN — DEXAMETHASONE 6 MG: 6 TABLET ORAL at 07:57

## 2020-01-01 RX ADMIN — REMDESIVIR 100 MG: 100 INJECTION, POWDER, LYOPHILIZED, FOR SOLUTION INTRAVENOUS at 16:14

## 2020-01-01 RX ADMIN — SACUBITRIL AND VALSARTAN 1 TABLET: 24; 26 TABLET, FILM COATED ORAL at 23:15

## 2020-01-01 RX ADMIN — GABAPENTIN 100 MG: 100 CAPSULE ORAL at 21:06

## 2020-01-01 RX ADMIN — INSULIN LISPRO 10 UNITS: 100 INJECTION, SOLUTION INTRAVENOUS; SUBCUTANEOUS at 12:11

## 2020-01-01 RX ADMIN — SALINE NASAL SPRAY 1 SPRAY: 1.5 SOLUTION NASAL at 20:15

## 2020-01-01 RX ADMIN — INSULIN GLARGINE 30 UNITS: 100 INJECTION, SOLUTION SUBCUTANEOUS at 21:21

## 2020-01-01 RX ADMIN — ATORVASTATIN CALCIUM 80 MG: 80 TABLET, FILM COATED ORAL at 21:16

## 2020-01-01 RX ADMIN — SACUBITRIL AND VALSARTAN 1 TABLET: 24; 26 TABLET, FILM COATED ORAL at 10:02

## 2020-01-01 RX ADMIN — INSULIN LISPRO 6 UNITS: 100 INJECTION, SOLUTION INTRAVENOUS; SUBCUTANEOUS at 12:12

## 2020-01-01 RX ADMIN — INSULIN LISPRO 4 UNITS: 100 INJECTION, SOLUTION INTRAVENOUS; SUBCUTANEOUS at 12:16

## 2020-01-01 RX ADMIN — DOXYCYCLINE 100 MG: 100 INJECTION, POWDER, LYOPHILIZED, FOR SOLUTION INTRAVENOUS at 17:03

## 2020-01-01 RX ADMIN — SACUBITRIL AND VALSARTAN 1 TABLET: 24; 26 TABLET, FILM COATED ORAL at 20:51

## 2020-01-01 RX ADMIN — INSULIN LISPRO 6 UNITS: 100 INJECTION, SOLUTION INTRAVENOUS; SUBCUTANEOUS at 17:31

## 2020-01-01 RX ADMIN — SPIRONOLACTONE 25 MG: 25 TABLET ORAL at 10:02

## 2020-01-01 RX ADMIN — DEXAMETHASONE 10 MG: 4 TABLET ORAL at 22:14

## 2020-01-01 RX ADMIN — APIXABAN 5 MG: 5 TABLET, FILM COATED ORAL at 20:25

## 2020-01-01 RX ADMIN — BENZONATATE 100 MG: 100 CAPSULE ORAL at 21:23

## 2020-01-01 RX ADMIN — INSULIN LISPRO 2 UNITS: 100 INJECTION, SOLUTION INTRAVENOUS; SUBCUTANEOUS at 12:00

## 2020-01-01 RX ADMIN — ATORVASTATIN CALCIUM 80 MG: 80 TABLET, FILM COATED ORAL at 21:06

## 2020-01-01 RX ADMIN — INSULIN LISPRO 6 UNITS: 100 INJECTION, SOLUTION INTRAVENOUS; SUBCUTANEOUS at 23:57

## 2020-01-01 RX ADMIN — INSULIN LISPRO 4 UNITS: 100 INJECTION, SOLUTION INTRAVENOUS; SUBCUTANEOUS at 12:30

## 2020-01-01 RX ADMIN — SODIUM CHLORIDE, PRESERVATIVE FREE 10 ML: 5 INJECTION INTRAVENOUS at 20:26

## 2020-01-01 RX ADMIN — INSULIN LISPRO 6 UNITS: 100 INJECTION, SOLUTION INTRAVENOUS; SUBCUTANEOUS at 18:21

## 2020-01-01 RX ADMIN — SODIUM CHLORIDE, PRESERVATIVE FREE 10 ML: 5 INJECTION INTRAVENOUS at 21:07

## 2020-01-01 RX ADMIN — SACUBITRIL AND VALSARTAN 1 TABLET: 24; 26 TABLET, FILM COATED ORAL at 21:16

## 2020-01-01 RX ADMIN — DOXYCYCLINE HYCLATE 100 MG: 100 TABLET, COATED ORAL at 13:06

## 2020-01-01 RX ADMIN — APIXABAN 5 MG: 5 TABLET, FILM COATED ORAL at 20:51

## 2020-01-01 RX ADMIN — FUROSEMIDE 80 MG: 10 INJECTION, SOLUTION INTRAMUSCULAR; INTRAVENOUS at 15:51

## 2020-01-01 RX ADMIN — INSULIN LISPRO 6 UNITS: 100 INJECTION, SOLUTION INTRAVENOUS; SUBCUTANEOUS at 01:19

## 2020-01-01 RX ADMIN — SODIUM CHLORIDE, PRESERVATIVE FREE 10 ML: 5 INJECTION INTRAVENOUS at 22:10

## 2020-01-01 RX ADMIN — SODIUM CHLORIDE, PRESERVATIVE FREE 10 ML: 5 INJECTION INTRAVENOUS at 21:16

## 2020-01-01 RX ADMIN — INSULIN LISPRO 2 UNITS: 100 INJECTION, SOLUTION INTRAVENOUS; SUBCUTANEOUS at 21:29

## 2020-01-01 RX ADMIN — DEXAMETHASONE SODIUM PHOSPHATE 10 MG: 10 INJECTION, SOLUTION INTRAMUSCULAR; INTRAVENOUS at 12:02

## 2020-01-01 RX ADMIN — INSULIN LISPRO 2 UNITS: 100 INJECTION, SOLUTION INTRAVENOUS; SUBCUTANEOUS at 12:46

## 2020-01-01 RX ADMIN — METOPROLOL SUCCINATE 50 MG: 50 TABLET, EXTENDED RELEASE ORAL at 23:15

## 2020-01-01 RX ADMIN — APIXABAN 5 MG: 5 TABLET, FILM COATED ORAL at 08:56

## 2020-01-01 RX ADMIN — GABAPENTIN 100 MG: 100 CAPSULE ORAL at 23:15

## 2020-01-01 RX ADMIN — TROPICAMIDE 1 DROP: 10 SOLUTION/ DROPS OPHTHALMIC at 13:48

## 2020-01-01 RX ADMIN — SODIUM CHLORIDE, PRESERVATIVE FREE 10 ML: 5 INJECTION INTRAVENOUS at 11:47

## 2020-01-01 RX ADMIN — DEXAMETHASONE SODIUM PHOSPHATE 10 MG: 10 INJECTION, SOLUTION INTRAMUSCULAR; INTRAVENOUS at 13:53

## 2020-01-01 RX ADMIN — SACUBITRIL AND VALSARTAN 1 TABLET: 24; 26 TABLET, FILM COATED ORAL at 08:33

## 2020-01-01 RX ADMIN — Medication 6000 UNITS: at 07:57

## 2020-01-01 RX ADMIN — INSULIN GLARGINE 10 UNITS: 100 INJECTION, SOLUTION SUBCUTANEOUS at 21:06

## 2020-01-01 RX ADMIN — ZINC SULFATE 220 MG (50 MG) CAPSULE 50 MG: CAPSULE at 08:33

## 2020-01-01 RX ADMIN — INSULIN GLARGINE 10 UNITS: 100 INJECTION, SOLUTION SUBCUTANEOUS at 22:11

## 2020-01-01 RX ADMIN — METOPROLOL SUCCINATE 50 MG: 50 TABLET, EXTENDED RELEASE ORAL at 10:56

## 2020-01-01 RX ADMIN — APIXABAN 5 MG: 5 TABLET, FILM COATED ORAL at 10:02

## 2020-01-01 RX ADMIN — DOXYCYCLINE 100 MG: 100 INJECTION, POWDER, LYOPHILIZED, FOR SOLUTION INTRAVENOUS at 03:53

## 2020-01-01 RX ADMIN — INSULIN LISPRO 2 UNITS: 100 INJECTION, SOLUTION INTRAVENOUS; SUBCUTANEOUS at 04:13

## 2020-01-01 RX ADMIN — APIXABAN 5 MG: 5 TABLET, FILM COATED ORAL at 22:10

## 2020-01-01 RX ADMIN — INSULIN LISPRO 8 UNITS: 100 INJECTION, SOLUTION INTRAVENOUS; SUBCUTANEOUS at 22:22

## 2020-01-01 RX ADMIN — GUAIFENESIN AND DEXTROMETHORPHAN 10 ML: 100; 10 SYRUP ORAL at 11:46

## 2020-01-01 RX ADMIN — INSULIN GLARGINE 10 UNITS: 100 INJECTION, SOLUTION SUBCUTANEOUS at 21:48

## 2020-01-01 RX ADMIN — INSULIN GLARGINE 10 UNITS: 100 INJECTION, SOLUTION SUBCUTANEOUS at 22:22

## 2020-01-01 RX ADMIN — INSULIN GLARGINE 20 UNITS: 100 INJECTION, SOLUTION SUBCUTANEOUS at 20:52

## 2020-01-01 RX ADMIN — SODIUM CHLORIDE, PRESERVATIVE FREE 10 ML: 5 INJECTION INTRAVENOUS at 21:26

## 2020-01-01 RX ADMIN — DOXYCYCLINE 100 MG: 100 INJECTION, POWDER, LYOPHILIZED, FOR SOLUTION INTRAVENOUS at 13:39

## 2020-01-01 RX ADMIN — INSULIN GLARGINE 10 UNITS: 100 INJECTION, SOLUTION SUBCUTANEOUS at 21:28

## 2020-01-01 RX ADMIN — Medication 6000 UNITS: at 08:56

## 2020-01-01 RX ADMIN — DEXAMETHASONE SODIUM PHOSPHATE 10 MG: 10 INJECTION, SOLUTION INTRAMUSCULAR; INTRAVENOUS at 00:30

## 2020-01-01 RX ADMIN — DOXYCYCLINE 100 MG: 100 INJECTION, POWDER, LYOPHILIZED, FOR SOLUTION INTRAVENOUS at 03:46

## 2020-01-01 RX ADMIN — INSULIN LISPRO 6 UNITS: 100 INJECTION, SOLUTION INTRAVENOUS; SUBCUTANEOUS at 18:04

## 2020-01-01 RX ADMIN — BENZONATATE 100 MG: 100 CAPSULE ORAL at 21:47

## 2020-01-01 RX ADMIN — SPIRONOLACTONE 25 MG: 25 TABLET ORAL at 07:57

## 2020-01-01 RX ADMIN — FUROSEMIDE 5 MG/HR: 10 INJECTION, SOLUTION INTRAMUSCULAR; INTRAVENOUS at 21:11

## 2020-01-01 RX ADMIN — ATORVASTATIN CALCIUM 80 MG: 80 TABLET, FILM COATED ORAL at 22:10

## 2020-01-01 RX ADMIN — GABAPENTIN 100 MG: 100 CAPSULE ORAL at 21:39

## 2020-01-01 RX ADMIN — INSULIN LISPRO 5 UNITS: 100 INJECTION, SOLUTION INTRAVENOUS; SUBCUTANEOUS at 20:52

## 2020-01-01 RX ADMIN — DOXYCYCLINE 100 MG: 100 INJECTION, POWDER, LYOPHILIZED, FOR SOLUTION INTRAVENOUS at 13:48

## 2020-01-01 RX ADMIN — SODIUM CHLORIDE, PRESERVATIVE FREE 10 ML: 5 INJECTION INTRAVENOUS at 20:51

## 2020-01-01 RX ADMIN — DOXYCYCLINE HYCLATE 100 MG: 100 TABLET, COATED ORAL at 08:33

## 2020-01-01 RX ADMIN — DOXYCYCLINE HYCLATE 100 MG: 100 TABLET, COATED ORAL at 20:26

## 2020-01-01 RX ADMIN — LORAZEPAM 0.5 MG: 0.5 TABLET ORAL at 23:15

## 2020-01-01 RX ADMIN — INSULIN LISPRO 2 UNITS: 100 INJECTION, SOLUTION INTRAVENOUS; SUBCUTANEOUS at 04:53

## 2020-01-01 RX ADMIN — INSULIN GLARGINE 10 UNITS: 100 INJECTION, SOLUTION SUBCUTANEOUS at 12:11

## 2020-01-01 RX ADMIN — INSULIN LISPRO 2 UNITS: 100 INJECTION, SOLUTION INTRAVENOUS; SUBCUTANEOUS at 13:01

## 2020-01-01 RX ADMIN — INSULIN LISPRO 4 UNITS: 100 INJECTION, SOLUTION INTRAVENOUS; SUBCUTANEOUS at 21:23

## 2020-01-01 RX ADMIN — Medication 6000 UNITS: at 16:13

## 2020-01-01 RX ADMIN — DEXAMETHASONE SODIUM PHOSPHATE 10 MG: 10 INJECTION, SOLUTION INTRAMUSCULAR; INTRAVENOUS at 00:32

## 2020-01-01 RX ADMIN — BRIMONIDINE TARTRATE 1 DROP: 2 SOLUTION OPHTHALMIC at 14:31

## 2020-01-01 RX ADMIN — SACUBITRIL AND VALSARTAN 1 TABLET: 24; 26 TABLET, FILM COATED ORAL at 21:31

## 2020-01-01 RX ADMIN — GABAPENTIN 100 MG: 100 CAPSULE ORAL at 20:49

## 2020-01-01 RX ADMIN — ATORVASTATIN CALCIUM 80 MG: 80 TABLET, FILM COATED ORAL at 21:31

## 2020-01-01 RX ADMIN — SODIUM CHLORIDE, PRESERVATIVE FREE 10 ML: 5 INJECTION INTRAVENOUS at 20:50

## 2020-01-01 RX ADMIN — INSULIN LISPRO 2 UNITS: 100 INJECTION, SOLUTION INTRAVENOUS; SUBCUTANEOUS at 03:36

## 2020-01-01 RX ADMIN — INSULIN LISPRO 2 UNITS: 100 INJECTION, SOLUTION INTRAVENOUS; SUBCUTANEOUS at 16:50

## 2020-01-01 ASSESSMENT — PATIENT HEALTH QUESTIONNAIRE - PHQ9
SUM OF ALL RESPONSES TO PHQ QUESTIONS 1-9: 0
SUM OF ALL RESPONSES TO PHQ QUESTIONS 1-9: 0
2. FEELING DOWN, DEPRESSED OR HOPELESS: 0
2. FEELING DOWN, DEPRESSED OR HOPELESS: 0
SUM OF ALL RESPONSES TO PHQ QUESTIONS 1-9: 0
SUM OF ALL RESPONSES TO PHQ QUESTIONS 1-9: 0
1. LITTLE INTEREST OR PLEASURE IN DOING THINGS: 0
SUM OF ALL RESPONSES TO PHQ9 QUESTIONS 1 & 2: 0
SUM OF ALL RESPONSES TO PHQ9 QUESTIONS 1 & 2: 0
1. LITTLE INTEREST OR PLEASURE IN DOING THINGS: 0

## 2020-01-01 ASSESSMENT — ENCOUNTER SYMPTOMS
ABDOMINAL PAIN: 0
SHORTNESS OF BREATH: 0
BLOOD IN STOOL: 0
NAUSEA: 0
SHORTNESS OF BREATH: 0
DIARRHEA: 0
BLOOD IN STOOL: 0
VOMITING: 0
SHORTNESS OF BREATH: 0
DIARRHEA: 0
CONSTIPATION: 0
ABDOMINAL PAIN: 0
VOMITING: 0
EYE PAIN: 0
BACK PAIN: 0
COUGH: 0
CONSTIPATION: 0
TROUBLE SWALLOWING: 0
WHEEZING: 0
NAUSEA: 0
SHORTNESS OF BREATH: 0

## 2020-01-01 ASSESSMENT — PAIN SCALES - GENERAL
PAINLEVEL_OUTOF10: 3
PAINLEVEL_OUTOF10: 0
PAINLEVEL_OUTOF10: 5
PAINLEVEL_OUTOF10: 0
PAINLEVEL_OUTOF10: 0
PAINLEVEL_OUTOF10: 5
PAINLEVEL_OUTOF10: 0

## 2020-01-01 ASSESSMENT — PAIN DESCRIPTION - LOCATION
LOCATION: GENERALIZED
LOCATION: HEAD
LOCATION: GENERALIZED

## 2020-01-01 ASSESSMENT — PAIN - FUNCTIONAL ASSESSMENT
PAIN_FUNCTIONAL_ASSESSMENT: ACTIVITIES ARE NOT PREVENTED
PAIN_FUNCTIONAL_ASSESSMENT: 0-10

## 2020-01-01 ASSESSMENT — PAIN DESCRIPTION - PROGRESSION: CLINICAL_PROGRESSION: NOT CHANGED

## 2020-01-01 ASSESSMENT — PAIN DESCRIPTION - PAIN TYPE: TYPE: ACUTE PAIN

## 2020-01-01 ASSESSMENT — PAIN SCALES - WONG BAKER: WONGBAKER_NUMERICALRESPONSE: 0

## 2020-01-07 NOTE — TELEPHONE ENCOUNTER
Please advise of cardiac risk assessment for cataract surgery. Currently taking Eliquis. Pt last saw Dr. Sonali Duque on 03/15/19. PMH significant for severe CAD in the RCA and LAD, HLD, HTN, ischemic cardiomyopathy with an LVEF of 30-35% and CHF. He underwent ICD placement 9/2015.

## 2020-01-07 NOTE — TELEPHONE ENCOUNTER
Cardiac Risk Assessment message information:     What type of procedure are you having:Cataract Surgery     When is your procedure scheduled for:Not yet scheduled     Who is the physician performing your procedure:Dr Andrey Ambriz    Which medications need to be held for your procedure and for how long:  They asked Cardiologist opinion       Phone Number:[phone] option #1 Ask for Antwan Carlson     Fax number to send the [facility] use:     Cardiologist:  Last Appointment:  Next Appointment:  Are you on any blood thinners:  History of Coronary Artery Disease:  Last stress test:  Last echo:  Device Type and :

## 2020-01-07 NOTE — TELEPHONE ENCOUNTER
[facility] Pt does not need to hold Eliquis. Please advise of clearance.

## 2020-01-10 NOTE — PATIENT INSTRUCTIONS
Please call your pharmacy if you need any refills of your medication(s). Please call our office at [phone] if you don't hear from us about your test results. Please be sure to call our office if your illness/problem has been treated for but has not completely resolved. Bring an accurate list of your medications with you at every appointment to ensure that we have the correct information. Our office hours are: Monday - Friday 7 am- 5 pm; Saturdays vary on doctor working.     Phone lines turn on at 8 am.

## 2020-01-10 NOTE — PROGRESS NOTES
1/10/2020    Dulce Cota is a 69 y.o. male    Chief Complaint   Patient presents with   [facility] Pre-op Exam     surgery 1- cataract right eye Dr Tyler HECK Dulce Cota is a 69 y.o. male presenting today with a chief complaint of needing preop clearance for right eye cataract removal.  Surgery is planned for January 14, 2020             Review of Systems   Constitutional: Negative for chills and fever. HENT: Negative for ear pain, mouth sores, nosebleeds and trouble swallowing. Eyes: Positive for visual disturbance. Negative for pain. Right eye cataract is present   Respiratory: Negative for shortness of breath and wheezing. Cardiovascular: Negative for chest pain and leg swelling. Gastrointestinal: Negative for abdominal pain, blood in stool, constipation, diarrhea, nausea and vomiting. Genitourinary: Negative for dysuria, hematuria and urgency. Musculoskeletal: Negative for joint swelling and neck pain. Skin: Negative for rash. Neurological: Negative for dizziness, tremors, syncope and weakness. Hematological: Negative for adenopathy. Does not bruise/bleed easily.        Past Medical History:   Diagnosis Date    Acute anxiety 7/9/2018    Acute on chronic systolic congestive heart failure ([facility] [address] 2/6/2014    Anemia     Arthritis     Atrial fibrillation (HCC)     Bronchiolitis obliterans organizing pneumonia ([facility] [address]     CAD (coronary artery disease)     cardiomyopathy    CHF (congestive heart failure) (HCC)     Congestive heart failure, unspecified     Congestive heart failure    Disease of blood and blood forming organ     Hyperlipidemia     Hypertension     Kidney stone     Neuropathy     Osteoarthritis 8/13/2012    Pneumonia     BOOP    Pure hypercholesterolemia 8/1/2011    Seasonal allergies     Sleep apnea     Type II or unspecified type diabetes mellitus without mention of complication, not stated as uncontrolled     Type II or prednisoLONE acetate (PRED FORTE) 1 % ophthalmic suspension Place 1 drop into the right eye 2 times daily      Insulin Pen Needle 32G X 6 MM MISC Tid use 100 each 5    albuterol sulfate HFA (PROAIR HFA) 108 (90 Base) MCG/ACT inhaler Inhale 2 puffs into the lungs every 6 hours as needed for Wheezing 1 Inhaler 3    metoprolol succinate (TOPROL XL) 100 MG extended release tablet TAKE 1 TABLET EVERY DAY 90 tablet 1    metFORMIN (GLUCOPHAGE) 500 MG tablet TAKE 1 TABLET TWICE DAILY WITH MEALS 180 tablet 3    ACCU-CHEK CARY PLUS strip TEST BLOOD SUGAR FOUR TIMES DAILY 400 strip 3    Continuous Blood Gluc Sensor (FREESTYLE RICCARDO 14 DAY SENSOR) MISC Use one sensor every 14 days 6 each 3    ELIQUIS 5 MG TABS tablet TAKE 1 TABLET TWICE DAILY 180 tablet 3    Blood Glucose Monitoring Suppl (ACCU-CHEK CARY PLUS) w/Device KIT 1 Device by Does not apply route 2 times daily Dx: E11.9 1 kit 0    Lancets MISC accu-check cary  DX: E11.9 100 each 3    Insulin Syringe-Needle U-100 (INSULIN SYRINGE .5CC/30GX1/2\") 30G X 1/2\" 0.5 ML MISC Use 5 times a day with insulin. GENERIC IS OK. 200 each 12    Insulin Pen Needle (B-D ULTRAFINE III SHORT PEN) 31G X 8 MM MISC 1 each by Does not apply route daily 100 each 3    Blood Glucose Calibration (ACCU-CHEK INSTANT CONTROL) LIQD 1 Bottle by In Vitro route 2 times daily 1 each 0    Alcohol Swabs (B-D SINGLE USE SWABS REGULAR) PADS 1 each by Does not apply route 2 times daily 100 each 0    gabapentin (NEURONTIN) 100 MG capsule Take 1 capsule by mouth daily for 180 days. (Patient taking differently: Take 100 mg by mouth nightly. ) 90 capsule 1     No current facility-administered medications for this visit. Vitals:    01/10/20 1545   BP: 124/82   Temp: 97.9 °F (36.6 °C)     Body mass index is 34.36 kg/m².   Immunization History   Administered Date(s) Administered    Influenza 12/05/2011    Influenza, High Dose (Fluzone 65 yrs and older) 10/06/2012, 10/01/2014, 11/02/2015, (LIPITOR) 80 MG tablet TAKE 1 TABLET BY MOUTH EVERY DAY 90 tablet 1    acyclovir (ZOVIRAX) 400 MG tablet Take 400 mg by mouth 2 times daily      polyethyl glycol-propyl glycol 0.4-0.3 % (SYSTANE) 0.4-0.3 % ophthalmic solution Place 1 drop into the right eye nightly      brimonidine (ALPHAGAN P) 0.1 % SOLN Place 1 drop into the right eye 2 times daily      prednisoLONE acetate (PRED FORTE) 1 % ophthalmic suspension Place 1 drop into the right eye 2 times daily      Insulin Pen Needle 32G X 6 MM MISC Tid use 100 each 5    albuterol sulfate HFA (PROAIR HFA) 108 (90 Base) MCG/ACT inhaler Inhale 2 puffs into the lungs every 6 hours as needed for Wheezing 1 Inhaler 3    metoprolol succinate (TOPROL XL) 100 MG extended release tablet TAKE 1 TABLET EVERY DAY 90 tablet 1    metFORMIN (GLUCOPHAGE) 500 MG tablet TAKE 1 TABLET TWICE DAILY WITH MEALS 180 tablet 3    ACCU-CHEK CARY PLUS strip TEST BLOOD SUGAR FOUR TIMES DAILY 400 strip 3    Continuous Blood Gluc Sensor (FREESTYLE RICCARDO 14 DAY SENSOR) MISC Use one sensor every 14 days 6 each 3    ELIQUIS 5 MG TABS tablet TAKE 1 TABLET TWICE DAILY 180 tablet 3    Blood Glucose Monitoring Suppl (ACCU-CHEK CARY PLUS) w/Device KIT 1 Device by Does not apply route 2 times daily Dx: E11.9 1 kit 0    Lancets MISC accu-check cary  DX: E11.9 100 each 3    Insulin Syringe-Needle U-100 (INSULIN SYRINGE .5CC/30GX1/2\") 30G X 1/2\" 0.5 ML MISC Use 5 times a day with insulin. GENERIC IS OK. 200 each 12    Insulin Pen Needle (B-D ULTRAFINE III SHORT PEN) 31G X 8 MM MISC 1 each by Does not apply route daily 100 each 3    Blood Glucose Calibration (ACCU-CHEK INSTANT CONTROL) LIQD 1 Bottle by In Vitro route 2 times daily 1 each 0    Alcohol Swabs (B-D SINGLE USE SWABS REGULAR) PADS 1 each by Does not apply route 2 times daily 100 each 0    gabapentin (NEURONTIN) 100 MG capsule Take 1 capsule by mouth daily for 180 days.  (Patient taking differently: Take 100 mg by mouth nightly. )

## 2020-01-13 NOTE — PROGRESS NOTES
[facility]  [phone]        Pre-Op Phone Call:     Patient Name: Sherice Juárez     Telephone Information:   Mobile [phone]     Home phone:  [phone]    Surgery Time:    8:30 AM     Arrival Time:  0700      Left extended Message:  NA     Message left with:      Recent change in health status:  No     Advised of transportation/ policy:  Yes     NPO policy reviewed:  Yes Patient      Advised to take morning heart/blood pressure medications with sips of water morning of surgery? Yes     Instructed to bring eye drops, photo identification, and insurance card day of surgery? Yes     Advised to wear short sleeved button down shirt (no T-shirt underneath):  Yes     Advised not to wear jewelry, hairpins, or pantyhose day of surgery? Yes     Advised not to wear make-up and to wash face day of surgery?   Yes    Remarks:        Electronically signed by:  Chantal Freedman RN at 1/13/2020 3:25 PM

## 2020-01-14 NOTE — OP NOTE
blood pressure and pulse oximetry monitors. A verbal time out occurred to verify the patient's name, date of birth and surgical site and procedure to be performed. The operative eye was prepped and draped in the usual sterile ophthalmic fashion. A lid speculum was placed. The operative microscope was brought into view over the operative eye. Dissection was made down to bare sclera in the inferonasal quadrant. About 2ml of a block of 2% lidocaine and 0.75% bupivicaine with hyaluronidase was injected in the subTenon's space. Two paracenteses were made using a 1.2 mm sideport blade at the inferior and superior positions. Preservative free lidocaine with epinephrine was injected into the anterior chamber. Viscoat was injected into the anterior chamber and the cannula and injection was used to break the nearly 360 degrees of posterior synechiae. A 2.4-mm keratome was used to make a biplanar clear corneal incision temporally. A continuous curvilinear capsulorrhexis was created with a cystitome needle and Utrata forceps. Hydrodissection was achieved with BSS on a 27-gauge flat- tip cannula. Phacoemulsification was used to remove the entirety of the nucleus. Bimanual irrigation and aspiration was used to remove cortical material. The capsular bag was filled with Provisc and a ZCB00 21.5 diopter lens was loaded into a lens injector and subsequently injected into the capsular bag. A Sinskey hook was used to position the trailing haptic. Bimanual irrigation and aspiration was used to remove the viscoelastic material.  BSS was to hydrate the incision and they were found to be watertight. Kenalog was injected superiorly into Tenon's capsule, at a small volume of 0.075ml. The speculum was removed under direct visualization of the microscope. The face was cleaned and dried. Generic Maxitrol ointment was placed in the surface of the eye.  The eye was patched and shielded and the patient was wheeled to the postoperative area in good condition. COMPLICATIONS: None   SPECIMENS REMOVED: None. ESTIMATED BLOOD LOSS: <5ml   INTRAOPERATIVE FLUIDS: Please see anesthesia record. SPONGE/INSTRUMENT/NEEDLE COUNTS: Correct at the end of the case. CONDITION ON DISCHARGE FROM OPERATING ROOM: Stable. The patient is to follow up the next day in the clinic with Dr. Corinne Castillo.

## 2020-01-14 NOTE — ANESTHESIA PRE PROCEDURE
TWICE DAILY WITH MEALS 5/24/19  Yes Dmitry Becerra, DO   ACCU-CHEK CARY PLUS strip TEST BLOOD SUGAR FOUR TIMES DAILY 12/19/18  Yes Dmitry Becerra DO   Continuous Blood Gluc Sensor (FREESTYLE RICCARDO 14 DAY SENSOR) MISC Use one sensor every 14 days 11/8/18  Yes Janelle Mathew DO   ELIQUIS 5 MG TABS tablet TAKE 1 TABLET TWICE DAILY 10/4/18  Yes Khalif Shields MD   Blood Glucose Monitoring Suppl (ACCU-CHEK CARY PLUS) w/Device KIT 1 Device by Does not apply route 2 times daily Dx: E11.9 4/23/18  Yes Dmitry Becerra DO   Lancets MISC accu-check cary  DX: E11.9 4/19/18  Yes Janelle Mathew DO   Insulin Syringe-Needle U-100 (INSULIN SYRINGE .5CC/30GX1/2\") 30G X 1/2\" 0.5 ML MISC Use 5 times a day with insulin. GENERIC IS OK. 3/16/17  Yes Dmitry Becerra DO   Insulin Pen Needle (B-D ULTRAFINE III SHORT PEN) 31G X 8 MM MISC 1 each by Does not apply route daily 3/14/17  Yes Janelle Mathew DO   Blood Glucose Calibration (ACCU-CHEK INSTANT CONTROL) LIQD 1 Bottle by In Vitro route 2 times daily 8/19/16  Yes Dmitry Becerra DO   Alcohol Swabs (B-D SINGLE USE SWABS REGULAR) PADS 1 each by Does not apply route 2 times daily 8/19/16  Yes Janelle Mathew DO   albuterol sulfate HFA (PROAIR HFA) 108 (90 Base) MCG/ACT inhaler Inhale 2 puffs into the lungs every 6 hours as needed for Wheezing 5/29/19   Dmitry Becerra DO   gabapentin (NEURONTIN) 100 MG capsule Take 1 capsule by mouth daily for 180 days. Patient taking differently: Take 100 mg by mouth nightly.   5/28/19 11/24/19  Dmitry Becerra DO       Current medications:    Current Facility-Administered Medications   Medication Dose Route Frequency Provider Last Rate Last Dose    0.9 % sodium chloride infusion   Intravenous Continuous Rosa Mesa  mL/hr at 01/14/20 0759      sodium chloride flush 0.9 % injection 10 mL  10 mL Intravenous 2 times per day Rosa Mesa MD        sodium chloride flush 0.9 % injection 10 mL  10 mL Intravenous PRN Angela Munguia MD        cyclopentolate 1%, phenylephrine 2.5%, tropicamide 1%, ketorolac 0.5% ophthalmic solution  3 mL Ophthalmic See Admin Instructions Queta Alba MD   3 mL at 01/14/20 0800       Allergies:     Allergies   Allergen Reactions    Cipro Xr     Claritin [Loratadine]     Levofloxacin     Peppermint Flavor [Flavoring Agent] Swelling       Problem List:    Patient Active Problem List   Diagnosis Code    HTN (hypertension) I10    Coronary artery disease involving native coronary artery of native heart without angina pectoris I25.10    Ischemic cardiomyopathy I25.5    COPD (chronic obstructive pulmonary disease) ([facility]) J44.9    Acute on chronic congestive heart failure ([facility]) I50.9    Acute kidney injury superimposed on chronic kidney disease ([facility]) N17.9, N18.9    Hyperlipidemia E78.5    DMII (diabetes mellitus, type 2) ([facility]) E11.9    Hypomagnesemia E83.42    Chest pain R07.9    Chronic atrial fibrillation I48.20    NSTEMI (non-ST elevated myocardial infarction) ([facility] I21.4    Cardiomyopathy [facility] I42.9    AMI (acute myocardial infarction) ([facility] I21.9    Uncontrolled type 2 diabetes mellitus with hyperosmolarity without coma, with long-term current use of insulin ([facility]) E11.00, Z79.4    Anemia D64.9    Insomnia G47.00    Automatic implantable cardioverter-defibrillator in situ Z95.810    Chronic systolic heart failure ([facility]) I50.22    Primary insomnia F51.01    Acute on chronic systolic congestive heart failure, NYHA class 4 ([facility]) I50.23    Influenza J11.1    Hyperkalemia E87.5    Acute anxiety F41.9    Primary insomnia F51.01    MIKEY (acute kidney injury) ([facility] N17.9    Stage 3 chronic kidney disease ([facility] N18.3       Past Medical History:        Diagnosis Date    Acute anxiety 7/9/2018    Acute on chronic systolic congestive heart failure ([facility] 2/6/2014    Anemia     Arthritis     Date of last liquid consumption: 01/13/20                        Date of last solid food consumption: 01/13/20    BMI:   Wt Readings from Last 3 Encounters:   01/14/20 220 lb (99.8 kg)   01/10/20 226 lb (102.5 kg)   11/26/19 224 lb 3.2 oz (101.7 kg)     Body mass index is 33.45 kg/m².     CBC:   Lab Results   Component Value Date    WBC 7.3 09/30/2019    RBC 4.56 09/30/2019    HGB 13.4 09/30/2019    HCT 41.6 09/30/2019    MCV 91.1 09/30/2019    RDW 16.5 09/30/2019     09/30/2019       CMP:   Lab Results   Component Value Date     01/10/2020    K 4.0 01/10/2020    K 4.2 09/29/2019    CL 94 01/10/2020    CO2 22 01/10/2020    BUN 30 01/10/2020    CREATININE 1.6 01/10/2020    GFRAA 52 01/10/2020    GFRAA >60 05/20/2013    AGRATIO 1.1 10/15/2019    LABGLOM 43 01/10/2020    GLUCOSE 205 01/10/2020    PROT 7.6 10/15/2019    PROT 6.8 02/22/2010    CALCIUM 9.4 01/10/2020    BILITOT 0.7 10/15/2019    ALKPHOS 83 10/15/2019    AST 27 10/15/2019    ALT 27 10/15/2019       POC Tests:   Recent Labs     01/14/20  0759   POCGLU 148*       Coags:   Lab Results   Component Value Date    PROTIME 25.3 01/17/2018    INR 2.24 01/17/2018    APTT 49.4 02/11/2015       HCG (If Applicable): No results found for: PREGTESTUR, PREGSERUM, HCG, HCGQUANT     ABGs: No results found for: PHART, PO2ART, MZP0VAT, SCI7XBL, BEART, Y6UCXGQG     Type & Screen (If Applicable):  No results found for: LABABO, [address]    Anesthesia Evaluation  Patient summary reviewed  Airway: Mallampati: III  TM distance: >3 FB   Neck ROM: full  Mouth opening: > = 3 FB Dental: normal exam         Pulmonary:   (+) pneumonia:  COPD:  sleep apnea:      (-) asthma and shortness of breath                           Cardiovascular:    (+) hypertension:, pacemaker:, past MI:, CAD:, dysrhythmias: atrial fibrillation, CHF:,     (-) valvular problems/murmurs, CABG/stent and  angina    ECG reviewed      Echocardiogram reviewed                  Neuro/Psych:      (-) seizures, TIA and CVA           GI/Hepatic/Renal:        (-) GERD, PUD, liver disease and no renal disease       Endo/Other:    (+) DiabetesType II DM, , .                 Abdominal:           Vascular: negative vascular ROS. Anesthesia Plan      MAC     ASA 3     (I discussed local anesthesia with intravenous sedation to the   patient's satisfaction and agreed including risks and alternatives. The  patient has no further questions. ALISA HARDING )  Induction: intravenous. Anesthetic plan and risks discussed with patient. Plan discussed with CRNA.                   Lissette Spear MD   1/14/2020

## 2020-02-06 PROBLEM — R80.9 MICROALBUMINURIA: Status: ACTIVE | Noted: 2020-01-01

## 2020-02-06 NOTE — TELEPHONE ENCOUNTER
Prepared samples of Entresto 24/26. Two bottles of 28 tablets each. Eliquis 5 mg Four boxes of 14 tablets each. Placed in sample closet for patient to  tomorrow.

## 2020-02-06 NOTE — PROGRESS NOTES
2/6/2020    Tamar Ledbetter is a 69 y.o. male    Chief Complaint   Patient presents with    Diabetes     check up        SUBJECTIVE      HPI Tamar Ledbetter is a 69 y.o. male presenting today with a chief complaint of needing a recheck on his diabetes. Current diabetes medications are metformin 500 mg twice a day and Novolin 70/30 45 units twice a day. In the past, he has been on Humalog with meals and Lantus but with him being in the donut hole he could not afford these meds. Pt is c/o right shoulder pain for 2-3 days -- he thinks he slept on it wrong. Xanax tabs in bottle today count is #34. Last urine drug screen November 26, 2019  Last diabetic eye check December 10, 2019    Cardiologist is Dr. Mary Mora. Chronic A. fib      CAD status post stents      Chronic systolic heart failure [place] heart class III      AICD implanted      2 g sodium diet    Electrophysiologist is Dr. Lilliam Hodges    Ophthalmologist is Dr. Rachel Knight. Last diabetic eye check was done on January 6, 2020. 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 Nephrologist is Dr. Michael Echols. Patient has not been there in 2 years due to finances. Review of Systems   Constitutional: Negative for chills, fatigue and fever. Respiratory: Negative for shortness of breath. Cardiovascular: Negative for chest pain and leg swelling.        Past Medical History:   Diagnosis Date    Acute anxiety 7/9/2018    Acute on chronic systolic congestive heart failure ([address] 2/6/2014    Anemia     Arthritis     Atrial fibrillation (HCC)     Bronchiolitis obliterans organizing pneumonia ([facility]     CAD (coronary artery disease)     cardiomyopathy    CHF (congestive heart failure) (HCC)     Congestive heart failure, unspecified     Congestive heart failure    Disease of blood and TAKE 1 TABLET BY MOUTH EVERY DAY 90 tablet 0    metFORMIN (GLUCOPHAGE) 500 MG tablet TAKE 1 TABLET TWICE DAILY WITH MEALS 180 tablet 0    Insulin NPH Isophane & Regular (NOVOLIN 70/30 FLEXPEN SC) Inject 45 Units into the skin 2 times daily      acyclovir (ZOVIRAX) 400 MG tablet Take 400 mg by mouth 2 times daily      polyethyl glycol-propyl glycol 0.4-0.3 % (SYSTANE) 0.4-0.3 % ophthalmic solution Place 1 drop into the right eye nightly      brimonidine (ALPHAGAN P) 0.1 % SOLN Place 1 drop into the right eye 2 times daily      prednisoLONE acetate (PRED FORTE) 1 % ophthalmic suspension Place 1 drop into the right eye 2 times daily      albuterol sulfate HFA (PROAIR HFA) 108 (90 Base) MCG/ACT inhaler Inhale 2 puffs into the lungs every 6 hours as needed for Wheezing 1 Inhaler 3     No current facility-administered medications for this visit. Vitals:    02/06/20 1204   BP: 122/80   Temp: 97.5 °F (36.4 °C)     Body mass index is 34.36 kg/m².   Immunization History   Administered Date(s) Administered    Influenza 12/05/2011    Influenza, High Dose (Fluzone 65 yrs and older) 10/06/2012, 10/01/2014, 11/02/2015, 10/24/2016, 08/29/2017, 10/04/2018    Influenza, Triv, inactivated, subunit, adjuvanted, IM (Fluad 65 yrs and older) 09/26/2019    Pneumococcal Conjugate 13-valent ([id number]) 11/10/2015    Pneumococcal Polysaccharide ([id number]) 06/14/2008, 12/05/2011, 02/15/2019    Tdap (Boostrix, Adacel) 07/26/2006, 02/15/2019       Component      Latest Ref Rng & Units 1/10/2020 11/22/2019 10/15/2019 10/11/2019   Sodium      136 - 145 mmol/L 138 138 135 (L)    Potassium      3.5 - 5.1 mmol/L 4.0 4.5 4.6    Chloride      99 - 110 mmol/L 94 (L) 99 98 (L)    CO2      21 - 32 mmol/L 22 21 18 (L)    Anion Gap      3 - 16 22 (H) 18 (H) 19 (H)    Glucose      70 - 99 mg/dL 205 (H) 217 (H) 291 (H)    BUN      7 - 20 mg/dL 30 (H) 43 (H) 45 (H)    Creatinine      0.8 - 1.3 mg/dL 1.6 (H) 1.8 (H) 1.5 (H)    GFR Non-      >60 43 (A) 37 (A) 46 (A)    GFR       >60 52 (A) 45 (A) 56 (A)    Calcium      8.3 - 10.6 mg/dL 9.4 9.1 9.5    Total Protein      6.4 - 8.2 g/dL   7.6    Albumin      3.4 - 5.0 g/dL   4.0    Albumin/Globulin Ratio      1.1 - 2.2   1.1    Bilirubin      0.0 - 1.0 mg/dL   0.7    Alk Phos      40 - 129 U/L   83    ALT      10 - 40 U/L   27    AST      15 - 37 U/L   27    Globulin      g/dL   3.6    Cholesterol, Total      0 - 199 mg/dL   95    Triglycerides      0 - 150 mg/dL   130    HDL Cholesterol      40 - 60 mg/dL   42    LDL Calculated      <100 mg/dL   27    VLDL Cholesterol Calculated      Not Established mg/dL   26    Microalbumin, Random Urine      <2.0 mg/dL    10.30 (H)   Creatinine, Ur      39.0 - 259.0 mg/dL    139.2   Microalbumin Creatinine Ratio      0.0 - 30.0 mg/g    74.0 (H)   Hemoglobin A1C      See comment %    8.1   eAG (mg/dL)      mg/dL    185.8   Prostatic Specific Ag      0.00 - 4.00 ng/mL   0.28    Magnesium      1.80 - 2.40 mg/dL         Lab Results   Component Value Date    LABA1C 8.1 10/11/2019    LABA1C 7.3 02/13/2019    LABA1C 8.7 10/22/2018     Lab Results   Component Value Date    LABMICR 10.30 (H) 10/11/2019    LDLCALC 27 10/15/2019    CREATININE 1.6 (H) 01/10/2020       Wt Readings from Last 10 Encounters:   02/06/20 226 lb (102.5 kg)   01/14/20 220 lb (99.8 kg)   01/10/20 226 lb (102.5 kg)   11/26/19 224 lb 3.2 oz (101.7 kg)   11/14/19 222 lb (100.7 kg)   10/15/19 222 lb 6.4 oz (100.9 kg)   10/10/19 224 lb 12.8 oz (102 kg)   10/03/19 240 lb 1.3 oz (108.9 kg)   09/26/19 247 lb (112 kg)   07/16/19 (!) 354 lb (160.6 kg)     Body mass index is 34.36 kg/m². The ASCVD Risk score (Charlotte Stack, et al., 2013) failed to calculate for the following reasons:     The patient has a prior MI or stroke diagnosis    Controlled Substance Monitoring:    Acute and Chronic Pain Monitoring:   RX Monitoring 2/6/2020   Attestation -   Periodic Controlled DAILY WITH MEALS 180 tablet 0    Insulin NPH Isophane & Regular (NOVOLIN 70/30 FLEXPEN SC) Inject 45 Units into the skin 2 times daily      acyclovir (ZOVIRAX) 400 MG tablet Take 400 mg by mouth 2 times daily      polyethyl glycol-propyl glycol 0.4-0.3 % (SYSTANE) 0.4-0.3 % ophthalmic solution Place 1 drop into the right eye nightly      brimonidine (ALPHAGAN P) 0.1 % SOLN Place 1 drop into the right eye 2 times daily      prednisoLONE acetate (PRED FORTE) 1 % ophthalmic suspension Place 1 drop into the right eye 2 times daily      albuterol sulfate HFA (PROAIR HFA) 108 (90 Base) MCG/ACT inhaler Inhale 2 puffs into the lungs every 6 hours as needed for Wheezing 1 Inhaler 3    [DISCONTINUED] sacubitril-valsartan (ENTRESTO) 24-26 MG per tablet Take 0.5 tablets by mouth 2 times daily 90 tablet 1    [DISCONTINUED] apixaban (ELIQUIS) 5 MG TABS tablet TAKE 1 TABLET TWICE DAILY 180 tablet 2    [DISCONTINUED] ALPRAZolam (XANAX) 0.5 MG tablet 1 po qd if needed for anxiety 90 tablet 0    [DISCONTINUED] Insulin Pen Needle 32G X 6 MM MISC Tid use 100 each 5    [DISCONTINUED] ACCU-CHEK CARY PLUS strip TEST BLOOD SUGAR FOUR TIMES DAILY 400 strip 3    [DISCONTINUED] Continuous Blood Gluc Sensor (FREESTYLE RICCARDO 14 DAY SENSOR) MISC Use one sensor every 14 days 6 each 3    [DISCONTINUED] Blood Glucose Monitoring Suppl (ACCU-CHEK CARY PLUS) w/Device KIT 1 Device by Does not apply route 2 times daily Dx: E11.9 1 kit 0    [DISCONTINUED] Lancets MISC accu-check cary  DX: E11.9 100 each 3    [DISCONTINUED] Insulin Syringe-Needle U-100 (INSULIN SYRINGE .5CC/30GX1/2\") 30G X 1/2\" 0.5 ML MISC Use 5 times a day with insulin. GENERIC IS OK.  200 each 12    [DISCONTINUED] Insulin Pen Needle (B-D ULTRAFINE III SHORT PEN) 31G X 8 MM MISC 1 each by Does not apply route daily 100 each 3    [DISCONTINUED] Blood Glucose Calibration (ACCU-CHEK INSTANT CONTROL) LIQD 1 Bottle by In Vitro route 2 times daily 1 each 0    [DISCONTINUED] Alcohol Swabs (B-D SINGLE USE SWABS REGULAR) PADS 1 each by Does not apply route 2 times daily 100 each 0     No facility-administered encounter medications on file as of 2/6/2020.           Fausto Camargo D.O.

## 2020-02-06 NOTE — PATIENT INSTRUCTIONS
Patient Education     VIS's given on hepatitis A vaccine and shingles vaccine  Referral given back to nephrology. Patient did not go to nephrology in the past due to finances. Discussed Trulicity. Information sheet given and prescription printed so that patient can check prices. If he is able to start the Trulicity, he is aware that he needs to cut his current insulin dose in half. We discussed that he carry glucose tablets with him at all times in case of a low sugar number. I also printed the handout about low sugar symptoms. Continue with weight loss. Patient is down 20 pounds in the last 7 months. Discussed cardio exercise. Labs due today her hemoglobin A1c and microalbumin. Orders printed    New meds this visit:    Orders Placed This Encounter   Medications    Dulaglutide 0.75 MG/0.5ML SOPN     Sig: Inject 0.75 mg into the skin once a week     Dispense:  4 pen     Refill:  2     New orders placed this visit:    Orders Placed This Encounter   Procedures    Hemoglobin A1C     Standing Status:   Future     Standing Expiration Date:   2/5/2021    Microalbumin / Creatinine Urine Ratio     Standing Status:   Future     Standing Expiration Date:   2/5/2021   No Barrera- -Houston Garcia MD, Nephrology, [facility]     Referral Priority:   Routine     Referral Type:   Eval and Treat     Referral Reason:   Specialty Services Required     Referred to Provider:   Rosalia Gant MD     Requested Specialty:   Nephrology     Number of Visits Requested:   1     Return in about 4 months (around 6/6/2020) for Diabetes check, new patient exam with new primary care doc. Hepatitis A Vaccine: What You Need to Know  Why get vaccinated? Hepatitis A is a serious liver disease. It is caused by the hepatitis A virus (HAV). HAV is spread from person to person through contact with the feces (stool) of people who are infected, which can easily happen if someone does not wash his or her hands properly.  You can medication can cause a severe allergic reaction. Such reactions from a vaccine are very rare, estimated at about 1 in a million doses, and would happen within a few minutes to a few hours after the vaccination. As with any medicine, there is a very remote chance of a vaccine causing a serious injury or death. The safety of vaccines is always being monitored. For more information, visit: www.cdc.gov/vaccinesafety. What if there is a serious problem? What should I look for? · Look for anything that concerns you, such as signs of a severe allergic reaction, very high fever, or unusual behavior. Signs of a severe allergic reaction can include hives, swelling of the face and throat, difficulty breathing, a fast heartbeat, dizziness, and weakness. These would usually start a few minutes to a few hours after the vaccination. What should I do? · If you think it is a severe allergic reaction or other emergency that can't wait, call call 911 and get to the nearest hospital. Otherwise, call your clinic. · Afterward, the reaction should be reported to the Vaccine Adverse Event Reporting System (VAERS). Your doctor should file this report, or you can do it yourself through the VAERS web site at www.vaers. [facility].gov, or by calling 2-449.971.6468. [employer] does not give medical advice. The National Vaccine Injury Compensation Program  The National Vaccine Injury Compensation Program (VICP) is a federal program that was created to compensate people who may have been injured by certain vaccines. Persons who believe they may have been injured by a vaccine can learn about the program and about filing a claim by calling 5-240.938.4564 or visiting the [employer] website at www.[facility].gov/vaccinecompensation. There is a time limit to file a claim for compensation. How can I learn more? · Ask your healthcare provider. He or she can give you the vaccine package insert or suggest other sources of information.   · Call your local or state health department. · Contact the Centers for Disease Control and Prevention (CDC):  ? Call 4-619.104.8649 (1-800-CDC-INFO). ? Visit CDC's website at www.cdc.gov/vaccines. Vaccine Information Statement  Hepatitis A Vaccine  7/20/2016  42 U. S.C. § 300aa-26  [place]. S. Department of Health and Human Services  Centers for Disease Control and Prevention  Many Vaccine Information Statements are available in Sami and other languages. See www.immunize.org/vis. Hojas de información sobre vacunas están disponibles en español y en otros idiomas. Visite www.immunize.org/vis. Care instructions adapted under license by [facility] ([facility]). If you have questions about a medical condition or this instruction, always ask your healthcare professional. [place] [address] any warranty or liability for your use of this information. Patient Education        Recombinant Zoster (Shingles) Vaccine, RZV: What you need to know  Why get vaccinated? Shingles (also called herpes zoster, or just zoster) is a painful skin rash, often with blisters. Shingles is caused by the varicella zoster virus, the same virus that causes chickenpox. After you have chickenpox, the virus stays in your body and can cause shingles later in life. You can't catch shingles from another person. However, a person who has never had chickenpox (or chickenpox vaccine) could get chickenpox from someone with shingles. A shingles rash usually appears on one side of the face or body and heals within 2 to 4 weeks. Its main symptom is pain, which can be severe. Other symptoms can include fever, headache, chills, and upset stomach. Very rarely, a shingles infection can lead to pneumonia, hearing problems, blindness, brain inflammation (encephalitis), or death. For about 1 person in 5, severe pain can continue even long after the rash has cleared up. This long-lasting pain is called post-herpetic neuralgia (PHN).   Shingles is far more common in people 48 years of age and older than in younger people, and the risk increases with age. It is also more common in people whose immune system is weakened because of a disease such as cancer, or by drugs such as steroids or chemotherapy. At least 1 million people a year in the [facility] get shingles. Shingles vaccine (recombinant)  Recombinant shingles vaccine was approved by FDA in 2017 for the prevention of shingles. In clinical trials, it was more than 90% effective in preventing shingles. It can also reduce the likelihood of PHN. Two doses, 2 to 6 months apart, are recommended for adults 48 and older. This vaccine is also recommended for people who have already gotten the live shingles vaccine (Zostavax). There is no live virus in this vaccine. Some people should not get this vaccine  Tell your vaccine provider if you:  · Have any severe, life-threatening allergies. A person who has ever had a life-threatening allergic reaction after a dose of recombinant shingles vaccine, or has a severe allergy to any component of this vaccine, may be advised not to be vaccinated. Ask your health care provider if you want information about vaccine components. · Are pregnant or breastfeeding. There is not much information about use of recombinant shingles vaccine in pregnant or nursing women. Your healthcare provider might recommend delaying vaccination. · Are not feeling well. If you have a mild illness, such as a cold, you can probably get the vaccine today. If you are moderately or severely ill, you should probably wait until you recover. Your doctor can advise you. Risks of a vaccine reaction  With any medicine, including vaccines, there is a chance of reactions.   After recombinant shingles vaccination, a person might experience:  · Pain, redness, soreness, or swelling at the site of the injection  · Headache, muscle aches, fever, shivering, fatigue  In clinical trials, most people got a sore arm with mild or moderate pain after vaccination, and some also had redness and swelling where they got the shot. Some people felt tired, had muscle pain, a headache, shivering, fever, stomach pain, or nausea. About 1 out of 6 people who got recombinant zoster vaccine experienced side effects that prevented them from doing regular activities. Symptoms went away on their own in about 2 to 3 days. Side effects were more common in younger people. You should still get the second dose of recombinant zoster vaccine even if you had one of these reactions after the first dose. Other things that could happen after this vaccine:  · People sometimes faint after medical procedures, including vaccination. Sitting or lying down for about 15 minutes can help prevent fainting and injuries caused by a fall. Tell your provider if you feel dizzy or have vision changes or ringing in the ears. · Some people get shoulder pain that can be more severe and longer-lasting than routine soreness that can follow injections. This happens very rarely. · Any medication can cause a severe allergic reaction. Such reactions to a vaccine are estimated at about 1 in a million doses, and would happen within a few minutes to a few hours after the vaccination. As with any medicine, there is a very remote chance of a vaccine causing a serious injury or death. The safety of vaccines is always being monitored. For more information, visit: www.cdc.gov/vaccinesafety/  What if there is a serious problem? What should I look for? · Look for anything that concerns you, such as signs of a severe allergic reaction, very high fever, or unusual behavior. Signs of a severe allergic reaction can include hives, swelling of the face and throat, difficulty breathing, a fast heartbeat, dizziness, and weakness. These would usually start a few minutes to a few hours after the vaccination. What should I do?   · If you think it is a severe allergic reaction or other emergency that can't wait, call 9-1-1 or get to the nearest hospital. Otherwise, call your health care provider. · Afterward, the reaction should be reported to the Vaccine Adverse Event Reporting System (VAERS). Your doctor should file this report, or you can do it yourself through the VAERS website at www.vaers. [facility].gov, or by calling 4-518.758.6893. VAERS does not give medical advice. .  How can I learn more? · Ask your health care provider. He or she can give you the vaccine package insert or suggest other sources of information. · Call your local or state health department. · Contact the Centers for Disease Control and Prevention (CDC):  ? Call 9-686.222.6814 (3-411-HYW-INFO) or  ? Visit CDC's website at www.cdc.gov/vaccines  Vaccine Information Statement (Interim)  Recombinant Zoster Vaccine  2/12/2018  [facility] for Disease Control and Prevention  Many Vaccine Information Statements are available in Wallisian and other languages. See www.immunize.org/vis. Hojas de Información Sobre Vacunas están disponibles en Español y en muchos otros idiomas. Visite [employer].si  Care instructions adapted under license by [facility] ([facility]). If you have questions about a medical condition or this instruction, always ask your healthcare professional. [address] any warranty or liability for your use of this information. Patient Education        dulaglutide  Pronunciation:  DOO la GLOO tide  Brand:  Trulicity Pen  What is the most important information I should know about dulaglutide? You should not use dulaglutide if you have Multiple Endocrine Neoplasia syndrome type 2 (MEN 2), or a personal or family history of medullary thyroid carcinoma (a type of thyroid cancer). Do not use dulaglutide if you are in a state of diabetic ketoacidosis (call your doctor for treatment). In animal studies, dulaglutide caused thyroid tumors or thyroid cancer.   It is not known whether these effects would occur in people using regular doses. Ask your doctor about your risk. Call your doctor at once if you have signs of a thyroid tumor, such as swelling or a lump in your neck, trouble swallowing, a hoarse voice, or shortness of breath. What is dulaglutide? Dulaglutide is an injectable diabetes medicine that helps control blood sugar levels. Dulaglutide is used together with diet and exercise to improve blood sugar control in adults with type 2 diabetes mellitus. Dulaglutide is usually given after other diabetes medicines have been tried without success. This medicine is not for treating type 1 diabetes. Dulaglutide may also be used for purposes not listed in this medication guide. What should I discuss with my healthcare provider before using dulaglutide? You should not use dulaglutide if you are allergic to it, or if you have:  · multiple endocrine neoplasia type 2 (tumors in your glands);  · a personal or family history of medullary thyroid carcinoma (a type of thyroid cancer); or  · diabetic ketoacidosis (call your doctor for treatment). Tell your doctor if you have ever had:  · pancreatitis;  · a stomach or intestinal disorder;  · gastroesophageal reflux disease (GERD) or slow digestion;  · liver or kidney disease;  · if you also use insulin or oral diabetes medicine; or  · if you have been sick with vomiting or diarrhea. In animal studies, dulaglutide caused thyroid tumors or thyroid cancer. It is not known whether these effects would occur in people using regular doses. Ask your doctor about your risk. It is not known whether this medicine will harm an unborn baby. Tell your doctor if you are pregnant or plan to become pregnant. It may not be safe to breast-feed while using this medicine. Ask your doctor about any risk. Dulaglutide is not approved for use by anyone younger than 25years old. How should I use dulaglutide?   Follow all directions on your prescription label and read all advice about side effects. You may report side effects to FDA at 5-747-FDA-8026. What other drugs will affect dulaglutide? Dulaglutide can slow your digestion, and it may take longer for your body to absorb any medicines you take by mouth. Other drugs may affect dulaglutide, including prescription and over-the-counter medicines, vitamins, and herbal products. Tell your doctor about all your current medicines and any medicine you start or stop using. Where can I get more information? Your pharmacist can provide more information about dulaglutide. Remember, keep this and all other medicines out of the reach of children, never share your medicines with others, and use this medication only for the indication prescribed. Every effort has been made to ensure that the information provided by [facility] is accurate, up-to-date, and complete, but no guarantee is made to that effect. Drug information contained herein may be time sensitive. [facility] information has been compiled for use by healthcare practitioners and consumers in the [place] and therefore [employer] does not warrant that uses outside of the [place] are appropriate, unless specifically indicated otherwise. [facility] drug information does not endorse drugs, diagnose patients or recommend therapy. [facility] drug information is an informational resource designed to assist licensed healthcare practitioners in caring for their patients and/or to serve consumers viewing this service as a supplement to, and not a substitute for, the expertise, skill, knowledge and judgment of healthcare practitioners. The absence of a warning for a given drug or drug combination in no way should be construed to indicate that the drug or drug combination is safe, effective or appropriate for any given patient. [facility] does not assume any responsibility for any aspect of healthcare administered with the aid of information [facility] provides.  The information contained herein is not intended to cover all possible uses, directions, precautions, warnings, drug interactions, allergic reactions, or adverse effects. If you have questions about the drugs you are taking, check with your doctor, nurse or pharmacist.  Copyright 1045-9935 [address]: 2.02. Revision date: 7/15/2019. Care instructions adapted under license by [facility] ([facility]). If you have questions about a medical condition or this instruction, always ask your healthcare professional. [place] [address] any warranty or liability for your use of this information. Patient Education        Hypoglycemia: Care Instructions  Your Care Instructions    Hypoglycemia means that your blood sugar is low and your body is not getting enough fuel. Some people get low blood sugar from not eating often enough. Some medicines to treat diabetes can cause low blood sugar. People who have had surgery on their stomachs or intestines may get hypoglycemia. Problems with the pancreas, kidneys, or liver also can cause low blood sugar. A snack or drink with sugar in it will raise your blood sugar and should ease your symptoms right away. Your doctor may recommend that you change or stop your medicines until you can get your blood sugar levels under control. In the long run, you may need to change your diet and eating habits so that you get enough fuel for your body throughout the day. Follow-up care is a key part of your treatment and safety. Be sure to make and go to all appointments, and call your doctor if you are having problems. It's also a good idea to know your test results and keep a list of the medicines you take. How can you care for yourself at home? · Learn to recognize the early signs of low blood sugar. Signs include:  ? Nausea. ? Hunger. ? Feeling nervous, irritable, or shaky. ? Cold, clammy, wet skin. ? Sweating (when you are not exercising). ?  A fast heartbeat.  ? Numbness or you don't hear from us about your test results. Please be sure to call our office if your illness/problem has been treated for but has not completely resolved. Bring an accurate list of your medications with you at every appointment to ensure that we have the correct information. Our office hours are: Monday - Friday 7 am- 5 pm; Saturdays vary on doctor working.     Phone lines turn on at 8 am.

## 2020-02-17 NOTE — PROGRESS NOTES
wheezing, rhonchi or rales. Lymphadenopathy:      Head:      Right side of head: No submental or submandibular adenopathy. Left side of head: No submental or submandibular adenopathy. Cervical: No cervical adenopathy. Upper Body:      Right upper body: No supraclavicular adenopathy. Left upper body: No supraclavicular adenopathy. Skin:     General: Skin is warm and dry. Coloration: Skin is not pale. Neurological:      Mental Status: He is alert. Assessment:       Diagnosis Orders   1. Cough  POCT Influenza A/B Antigen   2.  Myalgia  POCT Influenza A/B Antigen     Results for POC orders placed in visit on 02/17/20   POCT Influenza A/B Antigen   Result Value Ref Range    Inflenza A Ag negative     Influenza B Ag negative      Orders Placed This Encounter   Medications    cefUROXime (CEFTIN) 250 MG tablet     Sig: Take 1 tablet by mouth 2 times daily for 10 days     Dispense:  20 tablet     Refill:  0           Plan:      Do take the antibiotic  If this gets worse go to the ER        Darrius Ratliff MD

## 2020-02-25 NOTE — PROGRESS NOTES
in the ostial 80% left anterior descending artery lesion. Kissing balloon technique was utilized in the ostial circumflex artery and left anterior descending artery stents, resulting in 0% residual stenosis and DEN 3 flow. 2. In the dominant right coronary artery, successfully intervened upon with a 3 mm x 38 mm Xience drug-eluting stent dilated to 3.20 mm. There was DEN 3 flow in the vessel and 0% residual stenosis. 3. Otherwise, in the left system, there is an 80% hazy first diagonal branch lesion. There was 30% restenosis in the proximal circumflex artery stent and diffuse disease otherwise. There is a residual 70% lesion in the distal left anterior descending artery. 4. Moderately elevated left ventricular end-diastolic pressure of 25 mmHg. 5. Moderately severe left ventricular systolic dysfunction with global hypokinesis and left ventricular ejection fraction of 30%. Imaging:     Limited echo 4/16/14  Global ejection fraction is moderate-to-severely decreased and estimated  from 30 % to 35 %. Abnormal (paradoxical) septal motion is present. Severe Anterior and  anterospeptal wall hypokinesis  Mild mitral regurgitation is present. Normal right ventricular size and function. There is trace tricuspid regurgitation with RVSP estimated at 40 mmHg  assuming a right atrial pressure of 5mmHg. This is suggestive of mild pulmonary hypertension. Echo 2/2015  Global ejection fraction is moderate-to-severely decreased and estimated from 30 % to 35 %. Abnormal (paradoxical) septal motion is present likely due to . Severe anterior and anteroseptal hypokinesis c/w ischemic cardiomyopathy. As reported on 4-  Mild mitral regurgitation is present. Mildly dilated left atrium. Echo 1/18/18  Left ventricular size is moderately increased. Mild concentric left ventricular hypertrophy is present. Left ventricular function is reduced with ejection fraction estimated at 30%.   There is hypokinesis of the basal to mid inferior wall and interventricular  septum. Septal abnormality likely due to paced rhythm. Right ventricle is moderately dilated with moderate reduced function. Mildly dilated left atrium. Right atrial size is mildly dilated. Mild mitral regurgitation is present. Mild tricuspid regurgitation. Echo 10/1/19  Left ventricular cavity size is dilated. Normal left ventricular wall thickness. Ejection fraction is moderately reduced & visually estimated to be 30%. Mitral valve leaflets appear mildly thickened. Mild mitral regurgitation. The left atrium is dilated. Aortic valve appears sclerotic but opens adequately. Trivial aortic regurgitation. Pacer / ICD wire is visualized in the right ventricle. Tricuspid valve is structurally normal.  Mild tricuspid regurgitation with RVSP of 36 mmHg. The right atrium is dilated. Assessment:   1. Chronic Combined Systolic and Diastolic CHF, class 3. S/p Bi-V ICD placement. 2. Atrial fibrillation, paroxysmal  3. Essential Hypertension  4. CAD of native coronary arteries without angina   5. Hyperlipidemia with goal LDL<70mg/dL. 6. Chronic Kidney Disease, stage 3    Plan:  I think that Mr. Diaz Angel  is entirely stable from a cardiovascular standpoint. I see no need to make any changes currently in his medical regimen or pursue further testing. He denies any symptoms representing angina and he seems well compensated from a heart failure standpoint. His most recent device interrogation revealed normal functioning. He is on beta blockade, aldactone and Entresto therapy for his CHF. He is anticoagulated for his paroxysmal a-fib. I will see him in office for follow up in 6 months. This note was scribed in the presence of Mundo Chino MD by General Woodall, RN. Physician Attestation:  The scribes documentation has been prepared under my direction and personally reviewed by me in its entirety.      I, Dr. Juarez Tidwell personally performed the services described in this documentation as scribed by my RN,  Ellie Heath in my presence, and I confirm that the note above accurately reflects all work, treatment, procedures, and medical decision making performed by me.

## 2020-03-18 NOTE — TELEPHONE ENCOUNTER
[facility] calling pt dropped off prescriptions to them and his last one was misplaced by the pharmacy.  Will you rewrite him a new prescription ALPRAZolam (XANAX) 0.5 MG tablet

## 2020-03-24 NOTE — TELEPHONE ENCOUNTER
Spoke w/pt to let him know that I will mail him out a pt asst. Form b/c he have exceeded the amount of times given for samples and he stated ''no  told me do not worry about nothing, b/c I will take care of you with this  Medication, so therefore I should be able to get as many samples as I want, I will talk to  about this myself. '' I was told that I could send him 1 box so I advised him that I could send one box and he got even more upset.  Please clarify w/

## 2020-03-25 PROBLEM — F51.01 PRIMARY INSOMNIA: Status: RESOLVED | Noted: 2017-02-28 | Resolved: 2020-01-01

## 2020-04-02 NOTE — TELEPHONE ENCOUNTER
Pt calling he has sinus infection, lots of drainage and now has a sore throat from all the drainage. Pt does not want appt or do evist or VV. He wants antibiotics called into [facility].       Please call, [phone]

## 2020-05-04 NOTE — TELEPHONE ENCOUNTER
Pt is needing needles for his humalog     Pt stated that he is wanting the small needle 50 units     [facility]

## 2020-05-22 NOTE — TELEPHONE ENCOUNTER
Pt called today stating that his insurance is giving him a deductible every time he has a remote transmission. He states he needs it to be spread out every 6 months and pt wants to know if that is ok. Please advise.     You can reach the pt at [phone]

## 2020-06-29 NOTE — TELEPHONE ENCOUNTER
Pt called claims he is trying to get a discount through the company that makes Entresto. He said the company faxed a letter last week for information and has not received any info yet. Pt wanted a return call.     Rajesh # [phone]

## 2020-06-30 NOTE — TELEPHONE ENCOUNTER
Called and spoke with [facility] Patient Assistance and confirmed that the provider section is missing. I filled out a new form and put in Dr Leigh Ann Goins folder for signature. Called and spoke with patient, relayed message below. Patient expressed understanding.

## 2020-07-09 NOTE — PATIENT INSTRUCTIONS
Increase the torsemide back to the full tablet  Stop the soda  Use water  Elevate the legs when sitting  Call if worse  See in 2-3 weeks   do the blood work right before the visit

## 2020-07-09 NOTE — PROGRESS NOTES
Subjective:      Patient ID: Angela Rae is a 70 y.o. male. Patient presents with:  Leg Swelling: right leg swollen and blisters x weeks    ~2 weeks started with some tender and swelling of the right leg  He tells me this is same as had before  No fever no chill  He has a hx of swelling in the legs typical worse as day goes on and better in the am    No abdomen pain  No urine sx no blood no pain   Urine passing well    He tells me he is taking the aldactone  He only has been taking one half of demadex because \"I wanted to give my kidneys a break\" the cardiologist did not tell him to change    YOB: 1945    Date of Visit:  7/9/2020     -- Cipro Xr    -- Claritin (Loratadine)    -- Levofloxacin    -- Peppermint Flavor (Flavoring Agent) -- Swelling    Current Outpatient Medications:  spironolactone (ALDACTONE) 25 MG tablet, TAKE 1 TABLET BY MOUTH EVERY DAY, Disp: 90 tablet, Rfl: 0  ALPRAZolam (XANAX) 0.5 MG tablet, 1 po qd if needed for anxiety. No driving, no etoh. , Disp: 90 tablet, Rfl: 0  atorvastatin (LIPITOR) 80 MG tablet, TAKE 1 TABLET BY MOUTH EVERY DAY, Disp: 90 tablet, Rfl: 0  torsemide (DEMADEX) 20 MG tablet, Take 1 tablet by mouth daily, Disp: 90 tablet, Rfl: 0  Insulin Syringe-Needle U-100 30G X 5/16\" 0.5 ML MISC, 1 each by Does not apply route 3 times daily, Disp: 100 each, Rfl: 5  blood glucose test strips (ACCU-CHEK CARY) strip, 1 each by In Vitro route daily DX E11.00, Disp: 100 each, Rfl: 3  apixaban (ELIQUIS) 5 MG TABS tablet, TAKE 1 TABLET TWICE DAILY, Disp: 56 tablet, Rfl: 0  sacubitril-valsartan (ENTRESTO) 24-26 MG per tablet, Take 0.5 tablets by mouth 2 times daily, Disp: 56 tablet, Rfl: 0  gabapentin (NEURONTIN) 100 MG capsule, Take 1 capsule by mouth nightly for 180 days. , Disp: 90 capsule, Rfl: 0  metoprolol succinate (TOPROL XL) 100 MG extended release tablet, TAKE 1 TABLET EVERY DAY, Disp: 90 tablet, Rfl: 0  Insulin NPH Isophane & Regular (NOVOLIN 70/30 FLEXPEN SC), Inject 45 Units into the skin 2 times daily, Disp: , Rfl:   polyethyl glycol-propyl glycol 0.4-0.3 % (SYSTANE) 0.4-0.3 % ophthalmic solution, Place 1 drop into the right eye nightly, Disp: , Rfl:   brimonidine (ALPHAGAN P) 0.1 % SOLN, Place 1 drop into the right eye 2 times daily, Disp: , Rfl:   prednisoLONE acetate (PRED FORTE) 1 % ophthalmic suspension, Place 1 drop into the right eye 2 times daily, Disp: , Rfl:   albuterol sulfate HFA (PROAIR HFA) 108 (90 Base) MCG/ACT inhaler, Inhale 2 puffs into the lungs every 6 hours as needed for Wheezing, Disp: 1 Inhaler, Rfl: 3    No current facility-administered medications for this visit.       ---------------------------               07/09/20                      1106         ---------------------------   BP:          126/80         Site:    Left Upper Arm     Position:     Sitting        Cuff Size:   Large Adult      Pulse:         88           Temp:   97.3 °F (36.3 °C)   TempSrc:    Temporal        Weight:  236 lb (107 kg)    Height:  5' 8\" (1.727 m)   ---------------------------  Body mass index is 35.88 kg/m². Wt Readings from Last 3 Encounters:  07/09/20 : 236 lb (107 kg)  02/27/20 : 224 lb (101.6 kg)  02/17/20 : 230 lb (104.3 kg)    BP Readings from Last 3 Encounters:  07/09/20 : 126/80  02/27/20 : 120/60  02/17/20 : 138/82              Review of Systems    Objective:   Physical Exam  Constitutional:       General: He is not in acute distress. Appearance: Normal appearance. He is not ill-appearing. Cardiovascular:      Rate and Rhythm: Normal rate and regular rhythm. Comments: He has 3 + edema on the right below knee and 2+ on the left. He has some brownish pigment discoloration of the both lower anterior legs  No open area no increase warmth  Pulmonary:      Effort: Pulmonary effort is normal.      Breath sounds: Normal breath sounds. No decreased breath sounds, wheezing, rhonchi or rales.    Musculoskeletal: Comments: No pain to palpate the leg calves   Neurological:      Mental Status: He is alert. Assessment:       Diagnosis Orders   1.  Dependent edema  Basic Metabolic Panel       Mild stasis dermatitis but not acute  He needs to go back to full tablet torsemide      Plan:      Increase the torsemide back to the full tablet  Stop the soda  Use water  Elevate the legs when sitting  Call if worse  See in 2-3 weeks   do the blood work right before the visit        Ronny Valentin MD

## 2020-07-13 NOTE — TELEPHONE ENCOUNTER
It appears that he has been taking 1/2 tablet for a while. So please fill for 1/2 tablet BID, thanks.

## 2020-07-22 NOTE — TELEPHONE ENCOUNTER
Medication Samples    Medication:  Eliquis  Doseage of the medication:  5mg tablets  How are you taking this medication (QD, BID, TID, QID, PRN):  Take 1 tablet twice daily    in the office or Mail to your home?  in office on Friday    You can reach [facility] at [phone].

## 2020-07-30 NOTE — PATIENT INSTRUCTIONS
Clean the areas with soap and water  Take the antibiotic  Elevate the legs when sitting  Call if worse  Stay off metformin  Increase the insulin to 50 units twice a day  See me back in about 4 weeks

## 2020-07-30 NOTE — PROGRESS NOTES
Subjective:      Patient ID: Ondina Guzman is a 70 y.o. male. Patient presents with: Follow-up    He is feeling well and really no c/o  No fever  Swelling is better  He had a tender blister come up on the lower legs  Simple blister on the right and open area on the left posterior lower leg  Clean  No red no streaks    YOB: 1945    Date of Visit:  7/30/2020     -- Cipro Xr    -- Claritin (Loratadine)    -- Levofloxacin    -- Peppermint Flavor (Flavoring Agent) -- Swelling    Current Outpatient Medications:  spironolactone (ALDACTONE) 25 MG tablet, TAKE 1 TABLET BY MOUTH EVERY DAY, Disp: 90 tablet, Rfl: 0  ALPRAZolam (XANAX) 0.5 MG tablet, 1 po qd if needed for anxiety. No driving, no etoh. , Disp: 90 tablet, Rfl: 0  atorvastatin (LIPITOR) 80 MG tablet, TAKE 1 TABLET BY MOUTH EVERY DAY, Disp: 90 tablet, Rfl: 0  torsemide (DEMADEX) 20 MG tablet, Take 1 tablet by mouth daily, Disp: 90 tablet, Rfl: 0  Insulin Syringe-Needle U-100 30G X 5/16\" 0.5 ML MISC, 1 each by Does not apply route 3 times daily, Disp: 100 each, Rfl: 5  blood glucose test strips (ACCU-CHEK CARY) strip, 1 each by In Vitro route daily DX E11.00, Disp: 100 each, Rfl: 3  apixaban (ELIQUIS) 5 MG TABS tablet, TAKE 1 TABLET TWICE DAILY, Disp: 56 tablet, Rfl: 0  sacubitril-valsartan (ENTRESTO) 24-26 MG per tablet, Take 0.5 tablets by mouth 2 times daily, Disp: 56 tablet, Rfl: 0  metoprolol succinate (TOPROL XL) 100 MG extended release tablet, TAKE 1 TABLET EVERY DAY, Disp: 90 tablet, Rfl: 0  Insulin NPH Isophane & Regular (NOVOLIN 70/30 FLEXPEN SC), Inject 45 Units into the skin 2 times daily, Disp: , Rfl:   polyethyl glycol-propyl glycol 0.4-0.3 % (SYSTANE) 0.4-0.3 % ophthalmic solution, Place 1 drop into the right eye nightly, Disp: , Rfl:   brimonidine (ALPHAGAN P) 0.1 % SOLN, Place 1 drop into the right eye 2 times daily, Disp: , Rfl:   prednisoLONE acetate (PRED FORTE) 1 % ophthalmic suspension, Place 1 drop into the right eye 2 times daily, Disp: , Rfl:   albuterol sulfate HFA (PROAIR HFA) 108 (90 Base) MCG/ACT inhaler, Inhale 2 puffs into the lungs every 6 hours as needed for Wheezing, Disp: 1 Inhaler, Rfl: 3  gabapentin (NEURONTIN) 100 MG capsule, Take 1 capsule by mouth nightly for 180 days. , Disp: 90 capsule, Rfl: 0    No current facility-administered medications for this visit.       ---------------------------               07/30/20                      1457         ---------------------------   BP:          112/74         Site:    Left Upper Arm     Position:     Sitting        Cuff Size:   Large Adult      Pulse:         68           Temp:   97.2 °F (36.2 °C)   TempSrc:    Temporal        Weight: 229 lb (103.9 kg)   Height:  5' 8\" (1.727 m)   ---------------------------  Body mass index is 34.82 kg/m². Wt Readings from Last 3 Encounters:  07/30/20 : 229 lb (103.9 kg)  07/09/20 : 236 lb (107 kg)  02/27/20 : 224 lb (101.6 kg)    BP Readings from Last 3 Encounters:  07/30/20 : 112/74  07/09/20 : 126/80  02/27/20 : 120/60              Review of Systems    Objective:   Physical Exam  Constitutional:       General: He is not in acute distress. Appearance: Normal appearance. He is not ill-appearing. Cardiovascular:      Comments: Swelling is much better in the lower legs. At about 1+ now   Pulmonary:      Effort: Pulmonary effort is normal.      Breath sounds: Normal breath sounds. No decreased breath sounds, wheezing, rhonchi or rales. Skin:     General: Skin is warm and dry. Coloration: Skin is not pale. Neurological:      Mental Status: He is alert. Assessment:       Diagnosis Orders   1. Dependent edema     2.  Type 2 diabetes mellitus without complication, with long-term current use of insulin ([facility] [address]       Orders Placed This Encounter   Medications    cephALEXin (KEFLEX) 250 MG capsule     Sig: Take 1 capsule by mouth 4 times daily     Dispense:  28 capsule     Refill: 0     heis on the 70/30 and uses vials       Plan:      Clean the areas with soap and water  Take the antibiotic  Elevate the legs when sitting  Call if worse  Stay off metformin  Increase the insulin to 50 units twice a day  See me back in about 4 weeks        Eliot Glaser MD

## 2020-08-14 NOTE — PROGRESS NOTES
[facility]   Electrophysiology   Date: 8/14/2020    CC: \"I feel well\"     HPI: Renzo Estrada is a 70 y.o. with a PMH significant for severe CAD in the RCA and LAD, HLD, HTN, ischemic cardiomyopathy with an LVEF of 30-35% and CHF. He underwent ICD placement 9/2015. He previously followed in our office with Dr. Rubi President and Precfaby Woody CNP. He presents today for follow up. Today, he reports feeling well overall. He reports personal stress due to his wife's diagnosis of multiple myeloma. His breathing has been comfortable without shortness of breath. He denies chest pain with rest or exertion. He continues to make poor dietary choices. He states that he may need to have a tooth extraction in the near future. Allergies: Allergies   Allergen Reactions    Cipro Xr     Claritin [Loratadine]     Levofloxacin     Peppermint Flavor [Flavoring Agent] Swelling     Current Outpatient Medications   Medication Sig Dispense Refill    spironolactone (ALDACTONE) 25 MG tablet TAKE 1 TABLET BY MOUTH  DAILY 90 tablet 2    atorvastatin (LIPITOR) 80 MG tablet TAKE 1 TABLET BY MOUTH  DAILY 90 tablet 2    torsemide (DEMADEX) 20 MG tablet TAKE 1 TABLET BY MOUTH  DAILY 90 tablet 2    metoprolol succinate (TOPROL XL) 100 MG extended release tablet TAKE 1 TABLET EVERY DAY 90 tablet 2    gabapentin (NEURONTIN) 100 MG capsule Take 1 capsule by mouth nightly for 180 days. 90 capsule 1    Insulin Syringe-Needle U-100 30G X 5/16\" 0.5 ML MISC 1 each by Does not apply route 3 times daily 300 each 2    insulin 70-30 (NOVOLIN 70/30) (70-30) 100 UNIT per ML injection vial Inject 50 Units into the skin 2 times daily (before meals)      ALPRAZolam (XANAX) 0.5 MG tablet 1 po qd if needed for anxiety. No driving, no etoh.  90 tablet 0    blood glucose test strips (ACCU-CHEK CARY) strip 1 each by In Vitro route daily DX E11.00 100 each 3    apixaban (ELIQUIS) 5 MG TABS tablet TAKE 1 TABLET TWICE DAILY 56 tablet 0    sacubitril-valsartan (ENTRESTO) 24-26 MG per tablet Take 0.5 tablets by mouth 2 times daily 56 tablet 0    brimonidine (ALPHAGAN P) 0.1 % SOLN Place 1 drop into the right eye 2 times daily      albuterol sulfate HFA (PROAIR HFA) 108 (90 Base) MCG/ACT inhaler Inhale 2 puffs into the lungs every 6 hours as needed for Wheezing 1 Inhaler 3    polyethyl glycol-propyl glycol 0.4-0.3 % (SYSTANE) 0.4-0.3 % ophthalmic solution Place 1 drop into the right eye nightly      prednisoLONE acetate (PRED FORTE) 1 % ophthalmic suspension Place 1 drop into the right eye 2 times daily       No current facility-administered medications for this visit. Review of Systems:  · Constitutional: No unanticipated weight loss. There's been no change in energy level, sleep pattern, or activity level. No fevers, chills. · Eyes: No visual changes or diplopia. No scleral icterus. · ENT: No Headaches, hearing loss or vertigo. No mouth sores or sore throat. · Cardiovascular: as reviewed in HPI  · Respiratory: No cough or wheezing, no sputum production. No hemoptysis. · Gastrointestinal: No abdominal pain, appetite loss, blood in stools. No change in bowel or bladder habits. · Genitourinary: No dysuria, trouble voiding, or hematuria. · Musculoskeletal:  No gait disturbance, no joint complaints. · Integumentary: No rash or pruritis. · Neurological: No headache, diplopia, change in muscle strength, numbness or tingling. · Psychiatric: No anxiety or depression. · Endocrine: No temperature intolerance. No excessive thirst, fluid intake, or urination. No tremor. · Hematologic/Lymphatic: No abnormal bruising or bleeding, blood clots or swollen lymph nodes. · Allergic/Immunologic: No nasal congestion or hives. Physical Exam:   Constitutional: The patient is oriented to person, place, and time. Appears well-developed and well-nourished. In no acute distress. Head: Normocephalic and atraumatic. Pupils equal and round.   Neck: Neck supple. No JVP or carotid bruit appreciated. No mass and no thyromegaly present. No lymphadenopathy present. Cardiovascular: Normal rate. Normal heart sounds. Exam reveals no gallop and no friction rub. No murmur heard. Pulmonary/Chest: Effort normal and breath sounds normal. No respiratory distress. No wheezes, rhonchi or rales. Abdominal: Soft, non-tender. Bowel sounds are normal. Exhibits no organomegaly, mass or bruit. Extremities: No edema. No cyanosis or clubbing. Pulses are 2+ radial and carotid bilaterally. Neurological: No gross cranial nerve deficit. Coordination normal.   Skin: Skin is warm and dry. There is no rash or diaphoresis. Psychiatric: Patient has a normal mood and affect. Speech is normal and behavior is normal.     Vitals:    08/14/20 1610   BP: 135/80   Pulse: 94   Temp: 97.7 °F (36.5 °C)   SpO2: 96%       Labs:  6/25/20  Trig 101 HDL 42 LDL 60    ECG 8/14/20: ventricular pacing at 90 bpm      Procedures:     [facility] 2/2015  1. Successful percutaneous coronary intervention using a 2.75-mm Xience drug-eluting stent in an 80% mid left anterior descending artery lesion that was moderately calcified. This was dilated to 2.91 mm in diameter. Successful stenting with a 2.75 mm x 18 mm Xience drug-eluting stent in the ostial 80% left anterior descending artery lesion. Kissing balloon technique was utilized in the ostial circumflex artery and left anterior descending artery stents, resulting in 0% residual stenosis and DEN 3 flow. 2. In the dominant right coronary artery, successfully intervened upon with a 3 mm x 38 mm Xience drug-eluting stent dilated to 3.20 mm. There was DEN 3 flow in the vessel and 0% residual stenosis. 3. Otherwise, in the left system, there is an 80% hazy first diagonal branch lesion. There was 30% restenosis in the proximal circumflex artery stent and diffuse disease otherwise. There is a residual 70% lesion in the distal left anterior descending artery.   4.

## 2020-08-19 NOTE — TELEPHONE ENCOUNTER
Medication Question/Concern    What is the name of the medication you need to speak with someone about? Eliquis    Doseage of the medication:  5 mg     How are you taking this medication:  Take 1 tablet twice daily    What issues/concerns are you having with this medication:      Klaudia Young saw Dr. Cecilio Tsai on 8/14/20 and stated him taking Eliquis twice a day and since starting the medication has had headaches and does not like the way he feels. Wants to know if it would be okay to take 1 tablet in the morning and a 1/2 a dose in the evening? Or any other suggestions?       Can be reached at [phone]

## 2020-08-19 NOTE — TELEPHONE ENCOUNTER
Spoke with patient advised HA is not a typically SE of Entresto. He feels that it is medication related as the headaches began when he increased the medication.   He reported that is HA has lessened since taking full am dose of entresto and half of the dose in the PM.  He states he will continue to take full tablet 24-26 in AM and half tablet in the PM.

## 2020-08-19 NOTE — TELEPHONE ENCOUNTER
Patient states since starting to take Entresto 24-26 mg BID he has been getting headaches at night. He has been taking 1 tablet in the morning and a half at night. He doesn't know if its improving his headaches or not. Dr Jorge increased the [facility] at the last OV on 8/14/20. Patient previously was taking Entresto 24-26 0.5 tablet BID. His BP has been great.

## 2020-08-19 NOTE — TELEPHONE ENCOUNTER
Called patient wife reports he is in the shower. Instructed to call office when available to discuss his symptoms.

## 2020-08-20 NOTE — TELEPHONE ENCOUNTER
Please call patient. He can continue taking Entresto as he is currently and we can increase at a later date if he is able to tolerate.

## 2020-08-21 NOTE — PROGRESS NOTES
[address]  [phone]        Pre-Op Phone Call:     Patient Name: Iban Michel     Telephone Information:   Mobile [phone]     Home phone:  [phone]    Surgery Time:   [id number] [phone] Time:  3123     Left extended Message:  NA     Message left with:     Recent change in health status:  No     Advised of transportation/ policy:  Yes     NPO policy reviewed:  NA     Advised to take morning heart/blood pressure medications with sips of water morning of surgery? Yes     Instructed to bring eye drops, photo identification, and insurance card day of surgery? Yes     Advised to wear short sleeved button down shirt (no T-shirt underneath):  NA     Advised not to wear jewelry, hairpins, or pantyhose day of surgery? NA     Advised not to wear make-up and to wash face day of surgery?   NA    Remarks:        Electronically signed by:  Prisca Schultz RN at 8/21/2020 2:40 PM

## 2020-08-21 NOTE — TELEPHONE ENCOUNTER
Received denial from patient assistance b/c insurance covers this medication    If pt shows out of pocket of at least [phone] (household), we can send this in as an appeal    Spoke to pt and he had spoken to patient assistance and is working on getting all the reports from his and his wife. He will bring them to the office to give to me to submit.

## 2020-08-24 NOTE — OP NOTE
OPERATIVE NOTE    [facility]  [address]  [phone]      8/24/2020    Patient name: Giles العراقي  YOB: 1945  MRN: [id number]         DATE OF OPERATION: 8/24/2020     SURGEON: Ruther Eisenmenger  ASSISTANT(S): None           PREOPERATIVE DIAGNOSIS(ES): Posterior Capsule Opacification- right eye  POSTOPERATIVE DIAGNOSIS(ES):  Same    OPERATION(S) PERFORMED: Posterior capsulotomy per YAG laser, right eye    ESTIMATED BLOOD LOSS:  None  TYPE OF ANESTHESIA:  Proparacaine (Alcaine) 0.5% one drop to operative eye    NO SPECIMENS OBTAINED    INDICATIONS FOR PROCEDURE:  Patient complains of decrease vision in operative eye    DESCRIPTION OF PROCEDURE: The patient's operative eye was dilated with 1 drop of Tropicamide 1% opthalmic solution and 1 drop of 2.5% phenylephrine ophthalmic solution preoperatively. The patient was brought to the [facility] room. The operative eye was identified. A drop of Proparacaine was placed in the eye. The YAG Capsulotomy lens was placed on the operative eye. The YAG laser was applied and the posterior capsule was opened. The YAG Capsulotomy lens was removed. Alphagan P and Pred Forte 1% were given to the patient operatively in the PACU. The patient was discharged in good condition. ATTENDING ATTESTATION: I was present and scrubbed for the entire procedure.       ELECTRONICALLY SIGNED BY: Ruther Eisenmenger, 8/24/2020 2:42 PM

## 2020-09-11 NOTE — TELEPHONE ENCOUNTER
Spoke to pt today to get update on pharmacy records to send in to Patient Assistance. He has [facility] but still has not received [employer]. Pt will call [facility] a call today and request that it is faxed to us (if they will)    Pt also wanted to let Dr. Jorge know that he is taking 2 tablets daily of Entresto. Pt was told he could take 1 tablet in the am and 0.5 tablet in the evening due to pt's reporting headaches. Keeping this tel encounter open and will check with pt at end of next week.     Pt running low on Eliquis - [employer] will send samples

## 2020-09-11 NOTE — TELEPHONE ENCOUNTER
Pt called back - spoke to [employer] and they will send him a report - they cannot fax it to us. But said that our office could call Call [phone]  -     Call [employer] at number above to request year to date report showing all meds that this pt has pd for this year. Request has to be sent in writing to:  [phone] - Form will be faxed to our office to fill out and fax back.

## 2020-09-14 NOTE — PROGRESS NOTES
Subjective:      Patient ID: Osei Rojas is a 70 y.o. male. Patient presents with:  1 Month Follow-Up    Legs are doing better   The areas have cleared  The labs improved    He is here for the check up  breathing is better and the swelling is better   He is with out c/o  He wants the flu shot and no pb in past    YOB: 1945    Date of Visit:  9/14/2020     -- Cipro Xr    -- Claritin (Loratadine)    -- Levofloxacin    -- Peppermint Flavor (Flavoring Agent) -- Swelling    Current Outpatient Medications:  apixaban (ELIQUIS) 5 MG TABS tablet, TAKE 1 TABLET TWICE DAILY, Disp: 180 tablet, Rfl: 3  metoprolol succinate (TOPROL XL) 100 MG extended release tablet, TAKE 1 TABLET EVERY DAY, Disp: 7 tablet, Rfl: 0  sacubitril-valsartan (ENTRESTO) 24-26 MG per tablet, Take 1 tablet by mouth 2 times daily, Disp: 90 tablet, Rfl: 3  spironolactone (ALDACTONE) 25 MG tablet, TAKE 1 TABLET BY MOUTH  DAILY, Disp: 90 tablet, Rfl: 2  atorvastatin (LIPITOR) 80 MG tablet, TAKE 1 TABLET BY MOUTH  DAILY, Disp: 90 tablet, Rfl: 2  torsemide (DEMADEX) 20 MG tablet, TAKE 1 TABLET BY MOUTH  DAILY, Disp: 90 tablet, Rfl: 2  gabapentin (NEURONTIN) 100 MG capsule, Take 1 capsule by mouth nightly for 180 days. , Disp: 90 capsule, Rfl: 1  Insulin Syringe-Needle U-100 30G X 5/16\" 0.5 ML MISC, 1 each by Does not apply route 3 times daily, Disp: 300 each, Rfl: 2  insulin 70-30 (NOVOLIN 70/30) (70-30) 100 UNIT per ML injection vial, Inject 50 Units into the skin 2 times daily (before meals), Disp: , Rfl:   ALPRAZolam (XANAX) 0.5 MG tablet, 1 po qd if needed for anxiety. No driving, no etoh. , Disp: 90 tablet, Rfl: 0  blood glucose test strips (ACCU-CHEK CARY) strip, 1 each by In Vitro route daily DX E11.00, Disp: 100 each, Rfl: 3  polyethyl glycol-propyl glycol 0.4-0.3 % (SYSTANE) 0.4-0.3 % ophthalmic solution, Place 1 drop into the right eye nightly, Disp: , Rfl:   albuterol sulfate HFA (PROAIR HFA) 108 (90 Base) MCG/ACT inhaler, Inhale 2 puffs into the lungs every 6 hours as needed for Wheezing, Disp: 1 Inhaler, Rfl: 3    No current facility-administered medications for this visit.       ---------------------------               09/14/20                      1328         ---------------------------   BP:          124/82         Site:    Left Upper Arm     Position:     Sitting        Cuff Size:   Large Adult      Temp:   97.5 °F (36.4 °C)   TempSrc:    Temporal        Weight:  238 lb (108 kg)    Height:  5' 8\" (1.727 m)   ---------------------------  Body mass index is 36.19 kg/m². Wt Readings from Last 3 Encounters:  09/14/20 : 238 lb (108 kg)  08/14/20 : 232 lb (105.2 kg)  07/30/20 : 229 lb (103.9 kg)    BP Readings from Last 3 Encounters:  09/14/20 : 124/82  08/24/20 : (!) 154/93  08/14/20 : 135/80            Review of Systems    Objective:   Physical Exam  Constitutional:       General: He is not in acute distress. Appearance: Normal appearance. He is well-developed. He is not ill-appearing or diaphoretic. Cardiovascular:      Rate and Rhythm: Normal rate and regular rhythm. Heart sounds: Normal heart sounds. No murmur. No friction rub. No gallop. Pulmonary:      Effort: Pulmonary effort is normal. No tachypnea, accessory muscle usage or respiratory distress. Breath sounds: Normal breath sounds. No decreased breath sounds, wheezing, rhonchi or rales. Musculoskeletal:      Comments: Leg areas have cleared and ok    Lymphadenopathy:      Cervical: No cervical adenopathy. Upper Body:      Right upper body: No supraclavicular adenopathy. Left upper body: No supraclavicular adenopathy. Skin:     General: Skin is warm and dry. Coloration: Skin is not pale. Neurological:      Mental Status: He is alert. Assessment:       Diagnosis Orders   1.  Type 2 diabetes mellitus without complication, with long-term current use of insulin ([facility])  Hemoglobin A1C    Basic Metabolic Panel   2.  Needs flu shot             Plan:      Continue the medicines and the diet  See in 3 months         Eliot Glaser MD

## 2020-09-14 NOTE — PROGRESS NOTES
Vaccine Information Sheet, \"Influenza - Inactivated\"  given to Jamia Rodriguez, or parent/legal guardian of  Jamia Rodriguez and verbalized understanding. Patient responses:    Have you ever had a reaction to a flu vaccine? No  Do you have any current illness? No  Have you ever had Guillian [place] Syndrome? No  Do you have a serious allergy to any of the follow: Neomycin, Polymyxin, Thimerosal, eggs or egg products? No    Flu vaccine given per order. Please see immunization tab. Risks and benefits explained. Current VIS given.

## 2020-09-14 NOTE — TELEPHONE ENCOUNTER
Rajesh called in this afternoon stating that he is going to his PCP, Dr. Omar Ireland office, today. He states they want him to get a flu shot but he wants to check with Dr. Aidan Little that it does not interfere with any of his new heart medications. You can reach [facility] at [phone].

## 2020-09-24 NOTE — TELEPHONE ENCOUNTER
Pt called in to speak to Rukhsana about his pt assistance for Eliquis.     You can reach the pt at [phone]

## 2020-09-24 NOTE — TELEPHONE ENCOUNTER
Called and spoke with patient who states he has not received a statement from the pharmacy yet. Called and spoke with Lisa Peguero from [place] who states the patient has spent 3940.61 out of pocket. He is mailing the patient a copy.     Reference number is [id number]

## 2020-10-15 NOTE — TELEPHONE ENCOUNTER
Medication Samples     Medication:  Eliquis  Doseage of the medication:  5mg tablets  How are you taking this medication (QD, BID, TID, QID, PRN):  Take 1 tablet twice daily    in the office or Mail to your home? Pt states he cannot make it into our office and asked if we could mail to him home.     Robert Huber 19    Please let the pt know if we have this medication in the office at [phone]

## 2020-10-15 NOTE — TELEPHONE ENCOUNTER
Patient advised we do not have samples of eliquis at this time. He is going to drop off [facility] out of pocket statement at our office next week.

## 2020-10-27 NOTE — TELEPHONE ENCOUNTER
Rajesh stopped in this morning to drop off medication paper work for Annie Hester. I place it in Mary's folder.

## 2020-11-02 NOTE — PROGRESS NOTES
We received remote transmission from patient's monitor at home. Transmission shows normal sensing and pacing function. Pt is on Eliquis for known AF. EP physician will review. See interrogation under cardiology tab in the [address] [place] [address] [address] field for more details. Optivol is within normal range.

## 2020-11-02 NOTE — LETTER
[address] [employer] [facility] [phone]  [address] [facility],[address] [phone]    Pacemaker/Defibrillator Clinic          11/02/20        Tomasz Jeremy  [address] [phone]        Dear Tomasz Luna    This letter is to inform you that we received the transmission from your monitor at home that checks your implanted heart device. The next date your monitor will automatically transmit will be 5-3-20. If your report needs attention we will notify you. Your device and monitor are wireless and most transmit cellularly, but please periodically check your monitor is still plugged in to the electrical outlet. If you still use the telephone land line to send please ensure the connection to the phone ana maria is secure. This will help to ensure successful automatic transmissions in the future. Also, the monitor needs to be close to you while sleeping at night. Please be aware that the remote device transmission sites are periodically monitored only during regular business hours during which simultaneous in-office device clinics are being run. If your transmission requires attention, we will contact you as soon as possible. Thank you.             Jevon 81

## 2020-11-10 NOTE — TELEPHONE ENCOUNTER
Spoke to pt. He has not received renewal forms. Pt will only need to fill in and sign  He just applied and was approved on Nov 5th.    Mailed renewal application today     Provider form and paper rx in Dr. Cris Noe folder

## 2020-11-16 NOTE — TELEPHONE ENCOUNTER
Daylin Valdovinos thinks his daughter filled out the income information wrong. I don't see the application scanned to media. Do you have the application? If so can  you call the patient to make sure the income on the application is right?

## 2020-11-16 NOTE — TELEPHONE ENCOUNTER
Rajseh called in this morning stating that he did receive the paper work. He would like to speak to Rukhsana MONIQUE about the paper work.      He can be reached at [phone]

## 2020-11-17 NOTE — TELEPHONE ENCOUNTER
Pt will fax his application to our office    Provider forms are in Dr. Mehdi Mark folder and I have already printed out proof of income, insurance cards and pharmacy statements from last month that was just used for his application.

## 2020-11-19 NOTE — TELEPHONE ENCOUNTER
Patient calling to get a refill on metoprolol succinate 100 mg, just send to [employer] due to being in the donut whole.

## 2020-11-20 NOTE — TELEPHONE ENCOUNTER
Sindy Every stop in to drop off [address] [place] [address] Patient Assistance paperwork. I place them in Mary's folder.

## 2020-11-20 NOTE — TELEPHONE ENCOUNTER
Spoke to pt. He has not received renewal application. Copy will be mailed to pt today    Provider form and rx in Dr. Sudie Bence folder to sign.   Attached I have printed out proof of income, insurance card and allergy and med list

## 2020-11-20 NOTE — TELEPHONE ENCOUNTER
Pt dropped off application - faxed successfully to [facility].  Scanned into media

## 2020-11-30 NOTE — TELEPHONE ENCOUNTER
Pt Faxed over renewal application today 64/29 and was received, they are in Dailey folder for further review.

## 2020-12-09 NOTE — TELEPHONE ENCOUNTER
Dr. Barnes [facility]    Please advise regarding patient. BMP completed yesterday Cr 1.8 (baseline 1.4-1.5). Currently taking torsemide 10 mg daily and Aldactone 25 mg.

## 2020-12-09 NOTE — TELEPHONE ENCOUNTER
Called and spoke with patient advised of Dr. Bradshaw Mask order for IV lasix for three day. First dose tomorrow Thursday 12/10/2020 at 8:30 am    Verbalized understanding. Ellie Heath RN faxed over referral to infusion center.

## 2020-12-09 NOTE — TELEPHONE ENCOUNTER
Spoke to pt's wife: Whole body swollen, pt feels miserable today, SOB, coughing, can't rest because of coughing and fluid,     No chest pain, dizziness, headaches    Pt taking only 0.5 tablet of Torsemide each night. Medication list is reconciled.

## 2020-12-09 NOTE — TELEPHONE ENCOUNTER
I would set Rajesh up for three days of IV lasix in the infusion center at 60mg daily. He can hold his torsmeide those day. Emphasize dietary sodium restriction to him as he is not doing it.

## 2020-12-09 NOTE — TELEPHONE ENCOUNTER
Ariane Marissats wife Ziyad Villalba called in this morning stating that for the past 3/4 days he has been retaining fluids. She states that his whole body is swollen. She isn't sure if she should bring him to the ER, to see Dr. Jose Porter or an extra lasix's?        You can reach Ziyad Khand at #[phone]

## 2020-12-10 NOTE — TELEPHONE ENCOUNTER
Pt called to cancel all of his lasix infusion appts. Pt states he is feeling better and doesn't need it right now. Infusion called to let us know this as an FYI.

## 2020-12-14 NOTE — PROGRESS NOTES
Subjective:      Patient ID: Kaylie Zaragoza is a 70 y.o. male. Patient presents with:  Diabetes: discuss results    Here for the above  He is well and no c/o  meds the same     No fever no cold sx  But c/o of sinus congestion and cough for 3 weeks  Cough is productive  No fever no sore throat and can smell fine  No v no d  No cp breathing is the same. He will get kaufman   No ha urine is passing well no sx  No sores on the feet   He is on the same meds    YOB: 1945    Date of Visit:  12/14/2020     -- Cipro Xr    -- Claritin (Loratadine)    -- Levofloxacin    -- Peppermint Flavor (Flavoring Agent) -- Swelling    Current Outpatient Medications:    sacubitril-valsartan (ENTRESTO) 24-26 MG per tablet, Take 1 tablet by mouth 2 times daily, Disp: 180 tablet, Rfl: 4    metoprolol succinate (TOPROL XL) 100 MG extended release tablet, TAKE 1 TABLET BY MOUTH EVERY DAY, Disp: 90 tablet, Rfl: 1    apixaban (ELIQUIS) 5 MG TABS tablet, TAKE 1 TABLET TWICE DAILY, Disp: 180 tablet, Rfl: 4    ALPRAZolam (XANAX) 0.5 MG tablet, 1 po qd if needed for anxiety. No driving, no etoh. , Disp: 90 tablet, Rfl: 0    spironolactone (ALDACTONE) 25 MG tablet, TAKE 1 TABLET BY MOUTH  DAILY, Disp: 90 tablet, Rfl: 2    atorvastatin (LIPITOR) 80 MG tablet, TAKE 1 TABLET BY MOUTH  DAILY, Disp: 90 tablet, Rfl: 2    torsemide (DEMADEX) 20 MG tablet, TAKE 1 TABLET BY MOUTH  DAILY (Patient taking differently: Take 10 mg by mouth daily 0.5 tablet every night), Disp: 90 tablet, Rfl: 2    gabapentin (NEURONTIN) 100 MG capsule, Take 1 capsule by mouth nightly for 180 days.  (Patient taking differently: Take 100 mg by mouth as needed. ), Disp: 90 capsule, Rfl: 1    Insulin Syringe-Needle U-100 30G X 5/16\" 0.5 ML MISC, 1 each by Does not apply route 3 times daily, Disp: 300 each, Rfl: 2    insulin 70-30 (NOVOLIN 70/30) (70-30) 100 UNIT per ML injection vial, Inject 50 Units into the skin 2 times daily (before meals), Disp: , Rfl:   blood glucose test strips (ACCU-CHEK CARY) strip, 1 each by In Vitro route daily DX E11.00, Disp: 100 each, Rfl: 3    polyethyl glycol-propyl glycol 0.4-0.3 % (SYSTANE) 0.4-0.3 % ophthalmic solution, Place 1 drop into the right eye nightly, Disp: , Rfl:     albuterol sulfate HFA (PROAIR HFA) 108 (90 Base) MCG/ACT inhaler, Inhale 2 puffs into the lungs every 6 hours as needed for Wheezing, Disp: 1 Inhaler, Rfl: 3    No current facility-administered medications for this visit.       ---------------------------               12/14/20                      1310         ---------------------------   BP:          136/84         Site:    Left Upper Arm     Position:     Sitting        Cuff Size:   Large Adult      Pulse:         80           Temp:    97 °F (36.1 °C)    TempSrc:    Temporal        Weight: 250 lb (113.4 kg)   Height:  5' 8\" (1.727 m)   ---------------------------  Body mass index is 38.01 kg/m². Wt Readings from Last 3 Encounters:  12/14/20 : 250 lb (113.4 kg)  09/14/20 : 238 lb (108 kg)  08/14/20 : 232 lb (105.2 kg)    BP Readings from Last 3 Encounters:  12/14/20 : 136/84  09/14/20 : 124/82  08/24/20 : (!) 154/93                  Review of Systems    Objective:   Physical Exam  Constitutional:       General: He is not in acute distress. Appearance: Normal appearance. He is well-developed. He is not ill-appearing or diaphoretic. Cardiovascular:      Rate and Rhythm: Normal rate and regular rhythm. Heart sounds: Normal heart sounds. No murmur. No friction rub. No gallop. Pulmonary:      Effort: Pulmonary effort is normal. No tachypnea, accessory muscle usage or respiratory distress. Breath sounds: Normal breath sounds. No decreased breath sounds, wheezing, rhonchi or rales. Lymphadenopathy:      Cervical: No cervical adenopathy. Upper Body:      Right upper body: No supraclavicular adenopathy. Left upper body: No supraclavicular adenopathy. Skin:     General: Skin is warm and dry. Coloration: Skin is not pale. Neurological:      Mental Status: He is alert. Assessment:          Diagnosis Orders   1. Type 2 diabetes mellitus without complication, with long-term current use of insulin ([address]     2.  Essential hypertension         He tells me he was better with the lantus in past but could not afford to use  He tells me that will change in the new year      Plan:      Do stay with the diet  Increase the insulin to 55 units twice a day  Consider seeing the kidney specialist   See in February         Chicho Simmons MD

## 2020-12-14 NOTE — PATIENT INSTRUCTIONS
Do stay with the diet  Increase the insulin to 55 units twice a day  Consider seeing the kidney specialist   See in February

## 2020-12-22 PROBLEM — J96.01 ACUTE RESPIRATORY FAILURE WITH HYPOXEMIA (HCC): Status: ACTIVE | Noted: 2020-01-01

## 2020-12-22 NOTE — ED NOTES
Patient arrived via squad for SOB and positive COVID. He was 88% on room air when the squad arrived. He dropped to 88% on room air upon arriving to the ED>  Patient states he drank some pop this AM and threw up so that is why he called the squad. He is not complaining on nausea at this time. He started with COVID symptoms on Thursday and was Tested Friday and got the positive results today.        Maksim Red RN  12/22/20 1002

## 2020-12-22 NOTE — ED PROVIDER NOTES
Attending Supervisory Note/Shared Visit   I have personally performed a face to face diagnostic evaluation on this patient. I have reviewed the mid-levels findings and agree. History and Exam by me shows 53-year-old male who receives his outpatient Covid testing results as positive this morning. Patient presents with nausea and vomiting which started today. He denies associated abdominal pain. He denies significant shortness of breath, but is noted to be 88% on room air at the time of arrival.  He has had fatigue recently, but no significant chest pain. He does report a mild nonproductive cough. On exam here, he does have a low-grade temperature 100.2, on 2 L nasal cannula oxygen saturation improved appropriately to the mid 90s. Otherwise normal vital signs. Physical exam is unremarkable. Abdominal exam is benign. Presentation seems consistent with COVID-19 infection however also considered dehydration, pancreatitis, pneumonia, secondary pulmonary embolism, and Bolick disturbance. ABG shows pH of 7.34, mild hypercarbia at 51. Lactate is mildly elevated at 2.2. CBC is within normal limits. CMP shows creatinine of 1.7, which is baseline. EKG shows no acute ischemic changes however troponin is slightly elevated at 0.05, patient has a mild chronic elevation, but not to this extent. BNP is elevated as well at 10,000. Inflammatory markers are elevated. D-dimer is 5.28, however suspect this is secondary to Covid rather than acute thromboembolism. Chest x-ray shows multifocal airspace disease consistent with viral pneumonia. Patient was treated with IV fluids, Tylenol, and Decadron. He will be admitted for further management of COVID-19 infection given supplementary oxygen requirement as well as elevated troponin.     Per my interpretation:    Electrocardiogram (ECG) 12/22/2020 9:45 AM  RATE: 89 bpm  RHYTHM: Ventricular paced rhythm  AXIS: normal  INTERVALS: normal ST-T WAVE CHANGES: No evidence of ST segment elevation or T-wave inversion, more prominent ST depressions noted in V1 V2  Prior for comparison 8/14/2020      Georgiana Figueroa MD  Attending Emergency Physician        Georgiana Figueroa MD  12/22/20 0610

## 2020-12-22 NOTE — ED NOTES
Bed: B-34  Expected date: 12/22/20  Expected time:   Means of arrival: [facility]  Comments:     Wilma Singh RN  12/22/20 6348

## 2020-12-22 NOTE — ED PROVIDER NOTES
[address] [facility]        Pt Name: Ammon Morgan  MRN: [id number]  [place] 1945  Date of evaluation: 12/22/2020  Provider: Brandi Grant PA-C  PCP: Jl Mujica MD     I have seen and evaluated this patient with my supervising physician Tay Hart MD.    [address] [place] [address]       Chief Complaint   Patient presents with    Positive For Covid-19    Shortness of Breath       HISTORY OF PRESENT ILLNESS   (Location, Timing/Onset, Context/Setting, Quality, Duration, Modifying Factors, Severity, Associated Signs and Symptoms)  Note limiting factors. Ammon Morgan is a 70 y.o. male patient presenting by EMS with complaint nausea and vomiting. States he had some soda this morning, as he usually does, with subsequent emesis x2 or 3. He indicates shortness of breath and cough. He indicates EMS noted O2 saturations 88% with complaint short of breath. They initiated O2 2 L. As I am in the room the patient is off oxygen and saturating 88%. He was placed on 2 L O2 by nursing staff he is now saturating 93% - 95%. The patient does not smoke, have COPD or other luminary abnormality. States he began with respiratory symptoms Thursday of last week. Friday he had a Covid test which resulted positive a few days ago. Therefore the patient has Covid infection. Shortness of breath and cough with suspect a respiratory component to the Covid. Patient does have cardiac history and has a pacemaker defibrillator unit in place. States placed due to low EF. I do find echo study on September 29, 2019 with EF at 30%. None since. Cardiologist is Dr. Mitul Ignacio. Last visit with cardiology was August 14, 2020. At that time evaluated for chronic combined systolic and diastolic heart failure. Patient is diabetic. EMS blood sugar greater than 400. The patient's PCP is Dr. Jl Mujica. Patient care medications do include Novolin 70/30, Entresto 24/26, Toprol-, Eliquis 5 twice daily, Aldactone 25 daily, Lipitor 80 daily, Demadex 20 mg daily, gabapentin 100 nightly and albuterol as needed. Patient problem list does include hypertension, history pneumonia, diabetes, CAD, A. fib, anemia, hyperlipidemia, sleep apnea and chronic systolic heart failure. Nursing Notes were all reviewed and agreed with or any disagreements were addressed in the HPI. REVIEW OF SYSTEMS    (2-9 systems for level 4, 10 or more for level 5)     Review of Systems    Positives and Pertinent negatives as per HPI. Except as noted above in the ROS, all other systems were reviewed and negative.        PAST MEDICAL HISTORY     Past Medical History:   Diagnosis Date    Acute anxiety 7/9/2018    Acute on chronic systolic congestive heart failure ([facility] 2/6/2014    Anemia     Arthritis     Atrial fibrillation ([facility] [facility]     Bronchiolitis obliterans organizing pneumonia ([facility] [facility]     CAD (coronary artery disease)     cardiomyopathy    CHF (congestive heart failure) (HCC)     Congestive heart failure, unspecified     Congestive heart failure    Disease of blood and blood forming organ     Hyperlipidemia     Hypertension     Kidney stone     Neuropathy     Osteoarthritis 8/13/2012    Pneumonia     BOOP    Pure hypercholesterolemia 8/1/2011    Seasonal allergies     Sleep apnea     Type II or unspecified type diabetes mellitus without mention of complication, not stated as uncontrolled     Type II or unspecified type diabetes mellitus without mention of complication, uncontrolled 8/1/2011         SURGICAL HISTORY     Past Surgical History:   Procedure Laterality Date    ABLATION OF DYSRHYTHMIC FOCUS  11/06/2017    Dr. Donte Carreno  Smokeless tobacco: Never Used   Substance Use Topics    Alcohol use: No    Drug use: No       SCREENINGS    [place] Coma Scale  Eye Opening: Spontaneous  Best Verbal Response: Oriented  Best Motor Response: Obeys commands  Marie Coma Scale Score: 15        PHYSICAL EXAM    (up to 7 for level 4, 8 or more for level 5)     ED Triage Vitals   BP Temp Temp src Pulse Resp SpO2 Height Weight   -- -- -- -- -- -- -- --       Physical Exam    DIAGNOSTIC RESULTS   LABS:    Labs Reviewed   LACTIC ACID, PLASMA - Abnormal; Notable for the following components:       Result Value    Lactic Acid 2.2 (*)     All other components within normal limits    Narrative:     Performed at:  [address] [facility]   Phone [phone]   BLOOD GAS, VENOUS - Abnormal; Notable for the following components:    pH, Gregor 7.340 (*)     pCO2, Gregor 51.1 (*)     All other components within normal limits    Narrative:     Performed at:  [address] [facility] [address]   Phone [phone]   CBC WITH AUTO DIFFERENTIAL - Abnormal; Notable for the following components:    Platelets 92 (*)     MPV 11.8 (*)     Lymphocytes Absolute 0.2 (*)     All other components within normal limits    Narrative:     Performed at:  [address] [facility] [address]   Phone [phone]   COMPREHENSIVE METABOLIC PANEL W/ REFLEX TO MG FOR LOW K - Abnormal; Notable for the following components:    Sodium 130 (*)     Chloride 88 (*)     Anion Gap 18 (*)     Glucose 396 (*)     BUN 34 (*)     CREATININE 1.7 (*)     GFR Non- 40 (*)     GFR African American 48 (*)     Albumin/Globulin Ratio 1.0 (*)     Total Bilirubin 1.4 (*)     All other components within normal limits    Narrative:     Performed at:  [facility] [address] [place] [address] [employer] [facility]   Phone [phone]   TROPONIN - Abnormal; Notable for the following components:    Troponin 0.05 (*)     All other components within normal limits    Narrative:     Performed at:  [address] [facility] [address]   Phone [phone]   BRAIN NATRIURETIC PEPTIDE - Abnormal; Notable for the following components:    Pro-BNP 10,945 (*)     All other components within normal limits    Narrative:     Performed at:  [address] [facility] [facility]   Phone [phone]   PROTIME-INR - Abnormal; Notable for the following components:    Protime 21.4 (*)     INR 1.83 (*)     All other components within normal limits    Narrative:     Performed at:  [address] [facility] [facility]   Phone [phone]   BETA-HYDROXYBUTYRATE - Abnormal; Notable for the following components:    Beta-Hydroxybutyrate 1.00 (*)     All other components within normal limits    Narrative:     Performed at:  [address] [facility] [address]   Phone [phone]   FIBRINOGEN - Abnormal; Notable for the following components:    Fibrinogen 718 (*)     All other components within normal limits    Narrative:     Performed at:  [address] [facility] [facility]   Phone [phone]   LACTATE DEHYDROGENASE - Abnormal; Notable for the following components:     (*)     All other components within normal limits    Narrative:     Performed at:  [address] [employer] [facility]   Phone [phone]   FERRITIN - Abnormal; Notable for the following components:    Ferritin 474.6 (*)     All other components within normal limits    Narrative:     Performed at: [facility]  [address] [place] [address]   Phone [phone]   PROCALCITONIN - Abnormal; Notable for the following components:    Procalcitonin 0.21 (*)     All other components within normal limits    Narrative:     Performed at:  [facility]  [address] [facility] [address]   Phone [phone]   C-REACTIVE PROTEIN - Abnormal; Notable for the following components:    .5 (*)     All other components within normal limits    Narrative:     Performed at:  [facility]  [address] [place], [place] [address]   Phone [phone]   D-DIMER, QUANTITATIVE - Abnormal; Notable for the following components:    D-Dimer, Quant 528 (*)     All other components within normal limits    Narrative:     Performed at:  [facility]  [address] [facility] [address]   Phone [phone]   CULTURE, BLOOD 1   CULTURE, BLOOD 2   LEGIONELLA ANTIGEN, URINE   STREP PNEUMONIAE ANTIGEN   HEMOGLOBIN A1C    Narrative:     Performed at:  [facility]  [address] [place], [place] [address]   Phone [phone]   FIBRINOGEN   VITAMIN D 25 HYDROXY   TYPE AND SCREEN       All other labs were within normal range or not returned as of this dictation. EKG: All EKG's are interpreted by the Emergency Department Physician in the absence of a cardiologist.  Please see their note for interpretation of EKG.       RADIOLOGY:   Non-plain film images such as CT, Ultrasound and MRI are read by the radiologist. Plain radiographic images are visualized and preliminarily interpreted by the ED Provider with the below findings:        Interpretation per the Radiologist below, if available at the time of this note:    XR CHEST PORTABLE   Preliminary Result   Multifocal airspace opacities which may represent pulmonary edema, atelectasis and/or pneumonia. Findings nonspecific but can be seen with   atypical and viral pneumonia. Please correlate with patient history,   particularly of COVID-19 exposure. No results found. PROCEDURES   Unless otherwise noted below, none     Procedures    CRITICAL CARE TIME   Critical Care  There was a high probability of life-threatening clinical deterioration in the patient's condition requiring my urgent intervention. Total critical care time with the patient was 40 minutes excluding separately reportable procedures. Critical care required due to patients patient presenting with hypoxemia requiring oxygen replacement to maintain saturations above 92%. Patient is lactic acid and procalcitonin are both elevated. Patient with known Covid pneumonia and hypoxemia requiring oxygen replacement. Initial treatment provided. Consult with attending physician who personally evaluated the patient. I have sent perfect serve note to hospitalist for the patient to be admitted with diagnosis Covid pneumonia and hypoxemia requiring oxygen replacement.       CONSULTS:  None      EMERGENCY DEPARTMENT COURSE and DIFFERENTIAL DIAGNOSIS/MDM:   Vitals:    Vitals:    12/22/20 1300 12/22/20 1355 12/22/20 1415 12/22/20 1430   BP: (!) 143/97  (!) 153/80 (!) 153/80   Pulse: 93 95 91 90   Resp: (!) 35 (!) 38 (!) 31 23   Temp:   99 °F (37.2 °C)    TempSrc:   Oral    SpO2: 96% 95% 97% 95%   Weight:           Patient was given the following medications:  Medications   acetaminophen (TYLENOL) tablet 650 mg (has no administration in time range)     Or   acetaminophen (TYLENOL) suppository 650 mg (has no administration in time range)   enoxaparin (LOVENOX) injection 30 mg (has no administration in time range)   dexamethasone (DECADRON) tablet 6 mg (has no administration in time range)   Vitamin D (CHOLECALCIFEROL) tablet 6,000 Units (has no administration in time range) zinc sulfate (ZINCATE) capsule 50 mg (has no administration in time range)   doxycycline (VIBRAMYCIN) 100 mg in dextrose 5 % 100 mL IVPB (has no administration in time range)   furosemide (LASIX) injection 80 mg (has no administration in time range)   furosemide (LASIX) 100 mg in dextrose 5 % 100 mL infusion (has no administration in time range)   0.9 % sodium chloride bolus (0 mLs Intravenous Stopped 12/22/20 1230)   acetaminophen (TYLENOL) tablet 1,000 mg (1,000 mg Oral Given 12/22/20 1105)   dexamethasone (PF) (DECADRON) injection 10 mg (10 mg Intravenous Given 12/22/20 1353)         At 9:55 AM nursing staff informs me his temp is 100.4. He is tachycardic and tachypneic. At 12:15 PM laboratory studies resulting at a venous pH of 7.34, lactic acid 2.2, procalcitonin 0.21, , ferritin 474.6, fibrinogen 718, beta hydroxybutyrate 1.0 with A1c pending. Cardiac standpoint the patient's proBNP is 10,945, INR 1.83 and troponin 0.05. Patient CMP shows stable renal function with BUN 34, creatinine 1.7 and GFR 40. Blood sugar is 396. He is given 500 mL saline. Patient with previous EF of 30%. This x-ray showed multifocal airspace disease which may present pulmonary edema versus multifocal pneumonia/atypical pneumonia/viral pneumonia and correlating with positive Covid exposure and Covid positive study as of this morning. ED treatment Tylenol 1000 mg p.o., sodium chloride 500 mL. Discussed with attending physician at 12:35 PM.  Recommendation to admit because of Covid pneumonia and hypoxemia. I will send note to perfect serve. Thus far we have not obtained chest CT pulmonary study to rule out PE. I do believe elevated D-dimer and laboratory studies are consistent with Covid pneumonia. At 12:43 PM I sent note hospitalist via perfect serve. FINAL IMPRESSION      1. Pneumonia due to COVID-19 virus    2.  Hypoxemia requiring supplemental oxygen 3. Type 2 diabetes mellitus with hyperglycemia, unspecified whether long term insulin use ([facility] [address]          DISPOSITION/PLAN   DISPOSITION        PATIENT REFERREDTO:  No follow-up provider specified. DISCHARGE MEDICATIONS:  New Prescriptions    No medications on file       DISCONTINUED MEDICATIONS:  Discontinued Medications    BLOOD GLUCOSE TEST STRIPS (ACCU-CHEK CARY) STRIP    1 each by In Vitro route daily DX E11.00    INSULIN SYRINGE-NEEDLE U-100 30G X 5/16\" 0.5 ML MISC    1 each by Does not apply route 3 times daily              (Please note that portions of this note were completed with a voice recognition program.  Efforts were made to edit the dictations but occasionally words are mis-transcribed. )    Sahil Chan PA-C (electronically signed)           Sahil Chan PA-C  12/22/20 8458

## 2020-12-22 NOTE — PROGRESS NOTES
Patient arrived via stretcher from ED to room 5275. Patient ambulated from stretcher to chair with minimum assistance. Heart monitor connected and verified with CMU. Patient oriented to room and unit. All questions answered . Call light and bedside table within reach. Patient is resting quietly with no further questions at this time. Will continue to monitor.

## 2020-12-22 NOTE — PROGRESS NOTES
Medication Reconciliation    List of medications patient is currently taking is complete. Source of information: 1. Conversation with patient at bedside                                      2. EPIC records      Allergies  Cipro xr, Claritin [loratadine], Levofloxacin, and Peppermint flavor [flavoring agent]     Notes regarding home medications:   1. Patient did not receive any of his home medications prior to arrival to the emergency department today.   2. Patient takes Novolin 70/30 30-55 units SQ BID with food (dose depends on carbohydrate intake)    Yeni Scott PharmD, BCPS  12/22/2020 11:45 AM

## 2020-12-22 NOTE — H&P
Hospital Medicine History & Physical      PCP: Brigette Kay MD    Date of Admission: 12/22/2020    Date of Service: Pt seen/examined on 12/22/20 and Admitted to Inpatient with expected LOS greater than two midnights due to medical therapy. Chief Complaint:  Shortness of breath      History Of Present Illness:    70 y.o. male Salima Wen 77-year-old kate with history of severe CAD in the RCA and LAD, hyperlipidemia, hypertension, ischemic cardiomyopathy with left ventricle ejection fraction 30 to 45% and congestive heart failure. He underwent ICD placement in 9/2015.  -Presented to ED with complains of shortness of breath, he got outpatient Covid testing done which was positive. Noted to be 88% on room air, associate with fatigue, nonproductive cough, temperature of 100.2. He was placed on 2 L nasal cannula oxygen with improvement in oxygen saturation to mid 90s. He tells me that he has not been compliant with his diet, he has been drinking a lot of pops does not follow a strict sodium diet nor of strict diabetic diet. He does not know his dry weight but says he has weight has been around 245. Review of weights from primary care note  12/14/20 : 250 lb (113.4 kg)  09/14/20 : 238 lb (108 kg)  08/14/20 : 232 lb (105.2 kg)    Work-up in the ED, sodium 130, chloride 88, BUN 34, creatinine 1.7, lactic acid rated 2.2, glucose 396, procalcitonin 0.21, CRP elevated 148.5, , proBNP 10,945, initial troponin 0.05, D-dimer 528, fibrinogen 718. Patient being admitted for acute respiratory failure with hypoxemia secondary to COVID-19 pneumonia and acute on chronic systolic congestive heart failure. I read the patient on a standing scale in his emergency room --245 pounds.         Past Medical History:          Diagnosis Date    Acute anxiety 7/9/2018    Acute on chronic systolic congestive heart failure ([address] 2/6/2014    Anemia     Arthritis     Atrial fibrillation ([facility] [address]  Bronchiolitis obliterans organizing pneumonia ([facility] [address]     CAD (coronary artery disease)     cardiomyopathy    CHF (congestive heart failure) (HCC)     Congestive heart failure, unspecified     Congestive heart failure    Disease of blood and blood forming organ     Hyperlipidemia     Hypertension     Kidney stone     Neuropathy     Osteoarthritis 8/13/2012    Pneumonia     BOOP    Pure hypercholesterolemia 8/1/2011    Seasonal allergies     Sleep apnea     Type II or unspecified type diabetes mellitus without mention of complication, not stated as uncontrolled     Type II or unspecified type diabetes mellitus without mention of complication, uncontrolled 8/1/2011       Past Surgical History:          Procedure Laterality Date    ABLATION OF DYSRHYTHMIC FOCUS  11/06/2017    Dr. Parvez Vallejo CATARACT EXTRACTION Right 1/14/2020    PHACOEMULSIFICATION WITH INTRAOCULAR LENS IMPLANT COMPLEX/SYNECHIOLYSIS performed by Christiano Paez MD at [address]       Medications Prior to Admission:      Prior to Admission medications    Medication Sig Start Date End Date Taking? Authorizing Provider   benzonatate (TESSALON) 100 MG capsule Take 1 capsule by mouth 3 times daily as needed for Cough 12/16/20 12/23/20 Yes Michelle Ospina MD   insulin 70-30 (NOVOLIN 70/30) (70-30) 100 UNIT per ML injection vial Inject 55 Units into the skin 2 times daily (before meals)  Patient taking differently: Inject 30-55 Units into the skin 2 times daily (before meals)  12/14/20  Yes Michelle Ospina MD   ALPRAZolam Danetta Cowden) 0.5 MG tablet 1 po qd if needed for anxiety. No driving, no etoh.  12/14/20 3/17/21 Yes Michelle Ospina MD sacubitril-valsartan (ENTRESTO) 24-26 MG per tablet Take 1 tablet by mouth 2 times daily 11/20/20  Yes Regina Orozco MD   metoprolol succinate (TOPROL XL) 100 MG extended release tablet TAKE 1 TABLET BY MOUTH EVERY DAY 11/19/20  Yes Kacy Berg MD   apixaban (ELIQUIS) 5 MG TABS tablet TAKE 1 TABLET TWICE DAILY 11/10/20  Yes Regina Orozco MD   spironolactone (ALDACTONE) 25 MG tablet TAKE 1 TABLET BY MOUTH  DAILY 8/9/20  Yes Kacy Berg MD   atorvastatin (LIPITOR) 80 MG tablet TAKE 1 TABLET BY MOUTH  DAILY 8/9/20  Yes Kacy Berg MD   torsemide (DEMADEX) 20 MG tablet TAKE 1 TABLET BY MOUTH  DAILY  Patient taking differently: Take 10 mg by mouth daily 0.5 tablet every night 8/9/20  Yes Kacy Berg MD   gabapentin (NEURONTIN) 100 MG capsule Take 1 capsule by mouth nightly for 180 days. Patient taking differently: Take 100 mg by mouth as needed. 8/9/20 2/5/21 Yes Kacy Berg MD   polyethyl glycol-propyl glycol 0.4-0.3 % (SYSTANE) 0.4-0.3 % ophthalmic solution Place 1 drop into the right eye nightly   Yes Historical Provider, MD   albuterol sulfate HFA (PROAIR HFA) 108 (90 Base) MCG/ACT inhaler Inhale 2 puffs into the lungs every 6 hours as needed for Wheezing 5/29/19  Yes Morris Girard DO       Allergies:  Cipro xr, Claritin [loratadine], Levofloxacin, and Peppermint flavor [flavoring agent]    Social History:      The patient currently lives at home with his wife Kana Holman:   reports that he quit smoking about 54 years ago. He has a 1.00 pack-year smoking history. He has never used smokeless tobacco.  ETOH:   reports no history of alcohol use. Family History:      Reviewed      Problem Relation Age of Onset   [facility] Cancer Mother     Diabetes Mother     Diabetes Sister     Cancer Brother        REVIEW OF SYSTEMS:   Pertinent positives as noted in the HPI. All other systems reviewed and negative.     PHYSICAL EXAM PERFORMED: GLUCOSEU Negative 02/06/2014       Radiology:     CXR: I have reviewed the CXR with the following interpretation: Reviewed, bilateral multifocal airspace disease concerning for edema and pneumonia  EKG:  I have reviewed the EKG with the following interpretation  Ventricular paced rhythm, no acute ST deviation, more prominent ST depressions in V1 V2. XR CHEST PORTABLE   Preliminary Result   Multifocal airspace opacities which may represent pulmonary edema,   atelectasis and/or pneumonia. Findings nonspecific but can be seen with   atypical and viral pneumonia. Please correlate with patient history,   particularly of COVID-19 exposure. ASSESSMENT:    Active Hospital Problems    Diagnosis Date Noted    Acute respiratory failure with hypoxemia ([facility] [address] [J96.01] 12/22/2020     1. Pneumonia likely COVID-19 but cannot rule out underlying bacterial pneumonia  2. Acute respiratory failure with hypoxemia, increased work of breathing fatigue tiredness tachypnea on admission  3. Acute on chronic systolic congestive heart failure  4. History of severe CAD in RCA and LAD  5. Elevated troponin, likely secondary to cardiac stress from pneumonia  6. Ischemic cardiomyopathy  7.  ICD in place      PLAN:  Did not get sepsis protocol fluids as recommended to keep patients on the drier side with COVID19 and with underlying systolic heart failure  Trend inflammatory markers  Avoid nebulizer treatments, use metered-dose inhalers  Start Vitamin D 6000 units daily  Start Zn sulfate 50 mg OD  Decadron 6 mg x 10 days  Doxycycline for atypical pneumonia coverage  Prone as much as possible    Send type and screen for possible convalescent plasma rx  Monitor O2 sats, place O2 to maintain for SpO2 > 90% If Severe respiratory failure and need for noninvasive positive pressure ventilation, need negative pressure room as is more likely causes aerosolized secretions (BiPaP, CPAP, HiFlo NC). N95 with proper fitting for aerosolized procedures  Personal protective equipment including gown gloves surgical mask, eye protection  Monitor closely, if worse then will consider ID/Pulmonary evaluation    Continue apixaban 5 mg twice daily for anticoagulation  Continue Lipitor 80 mg nightly  Continue Entresto  Continue spironolactone  Continue Toprol-XL  He says he feels he is volume overloaded, his weight is 245 pounds, clinically he appears to be in heart failure more than pneumonia  Blood pressure stable, I will give him a dose of IV Lasix and start him on Lasix drip  Strict input and output  Daily standing weights  For diabetes I will hold off 70/30 insulin, will start 10 units Lantus twice daily, sliding scale insulin, prandial insulin  Hypoglycemia protocol  . DVT Prophylaxis: Apixaban  Diet: Carb controlled, cardiac diet  Code Status: Full code    PT/OT Eval Status: N/A order once acute issues resolved    Dispo - Admit as inpatient. I anticipate hospitalization spanning more than two midnights for investigation and treatment of the above medically necessary diagnoses. Total critical care time spent not including procedures 35 min      Alina Chino MD  Hospitalist    Thank you Tricia Canales MD for the opportunity to be involved in this patient's care. If you have any questions or concerns please feel free to contact me at [phone].

## 2020-12-22 NOTE — PROGRESS NOTES
4 Eyes Skin Assessment     NAME:  Milton Velasquez  YOB: 1945  MEDICAL RECORD NUMBER:  [id number]    The patient is being assess for  Admission    I agree that 2 RN's have performed a thorough Head to Toe Skin Assessment on the patient. ALL assessment sites listed below have been assessed. Areas assessed by both nurses:    Head, Face, Ears, Shoulders, Back, Chest, Arms, Elbows, Hands, Sacrum. Buttock, Coccyx, Ischium and Legs. Feet and Heels        Does the Patient have a Wound?  No noted wound(s)       Lance Prevention initiated:  No   Wound Care Orders initiated:  No    Pressure Injury (Stage 3,4, Unstageable, DTI, NWPT, and Complex wounds) if present place consult order under [de-identified] No    New and Established Ostomies if present place consult order under : No      Nurse 1 eSignature: Electronically signed by Keri Kellogg RN on 12/22/20 at 4:50 PM EST    **SHARE this note so that the co-signing nurse is able to place an eSignature**    Nurse 2 eSignature: Electronically signed by Eris Wahl RN on 12/22/20 at 5:20 PM EST

## 2020-12-23 NOTE — PROGRESS NOTES
Pulmonology Consult has been called to Neris Sultana  @ 10:04 am 12/23/2020  RN is aware.   Ashley Orosco

## 2020-12-23 NOTE — PROGRESS NOTES
Hospitalist Progress Note      PCP: Lory Salmon MD    Date of Admission: 12/22/2020    Chief Complaint: shortness of breath    Subjective:   Feels short of breath but better than on admission  Says doesn't have much apetite     Inflammatory markers trending upwards      Medications:  Reviewed    Infusion Medications    dextrose      furosemide (LASIX) 1mg/ml infusion 5 mg/hr (12/22/20 2111)     Scheduled Medications    dexamethasone  6 mg Oral Daily    Vitamin D  6,000 Units Oral Daily    zinc sulfate  50 mg Oral Daily    doxycycline (VIBRAMYCIN) IV  100 mg Intravenous Q12H    apixaban  5 mg Oral BID    atorvastatin  80 mg Oral Nightly    gabapentin  100 mg Oral Nightly    metoprolol succinate  100 mg Oral Daily    sacubitril-valsartan  1 tablet Oral BID    spironolactone  25 mg Oral Daily    sodium chloride flush  10 mL Intravenous 2 times per day    insulin glargine  10 Units Subcutaneous BID    insulin lispro  0.05 Units/kg Subcutaneous TID WC    insulin lispro  0-6 Units Subcutaneous TID WC    insulin lispro  0-3 Units Subcutaneous Nightly     PRN Meds: ALPRAZolam, benzonatate, albuterol sulfate HFA, glucose, dextrose, glucagon (rDNA), dextrose, sodium chloride flush, promethazine **OR** ondansetron, polyethylene glycol, acetaminophen **OR** acetaminophen      Intake/Output Summary (Last 24 hours) at 12/23/2020 0959  Last data filed at 12/23/2020 0955  Gross per 24 hour   Intake 610 ml   Output 2350 ml   Net -1740 ml       Physical Exam Performed:    BP (!) 161/91   Pulse 90   Temp 97.7 °F (36.5 °C) (Oral)   Resp 24   Ht 5' 8\" (1.727 m)   Wt 240 lb 15.4 oz (109.3 kg)   SpO2 93%   BMI 36.64 kg/m²     General appearance: Morbidly obese male, mild distress due to shortness of breath but able to talk in full sentences today, appears stated age and cooperative. HEENT:  Normal cephalic, atraumatic without obvious deformity. Pupils equal, round, and reactive to light. Extra ocular muscles intact. Conjunctivae/corneas clear. Neck: Supple, with full range of motion. JVD was unable to assess due to body habitus  Trachea midline. Respiratory: normal wob. decreased breath sounds in all lung zones, crackles present,  Cardiovascular:  Regular rate and rhythm with normal S1/S2 without murmurs, rubs or gallops. Abdomen: Soft, non-tender, non-distended with normal bowel sounds. Musculoskeletal:  No clubbing, cyanosis, 3+ bilateral lower extremity edema, chronic venous stasis changes full range of motion without deformity. Skin: Skin color, texture, turgor normal.  No rashes or lesions. Neurologic:  Neurovascularly intact without any focal sensory/motor deficits. Cranial nerves: II-XII intact, grossly non-focal.  Psychiatric:  Alert and oriented, thought content appropriate, normal insight  Capillary Refill: Brisk,< 3 seconds   Peripheral Pulses: +2 palpable, equal bilaterally           Labs:   Recent Labs     12/22/20  0953 12/23/20  0634   WBC 8.0 7.7   HGB 14.1 14.4   HCT 43.1 43.7   PLT 92* 89*     Recent Labs     12/22/20  0953 12/23/20  0634   * 133*   K 3.8 3.9   CL 88* 93*   CO2 24 27   BUN 34* 38*   CREATININE 1.7* 1.6*   CALCIUM 9.1 8.8     Recent Labs     12/22/20  0953   AST 18   ALT 11   BILITOT 1.4*   ALKPHOS 89     Recent Labs     12/22/20  0953   INR 1.83*     Recent Labs     12/22/20  0953   TROPONINI 0.05*       Urinalysis:      Lab Results   Component Value Date    NITRU Negative 02/06/2014    WBCUA 0-2 02/06/2014    RBCUA 0-2 02/06/2014    BLOODU TRACE-INTACT 02/06/2014    SPECGRAV 1.015 02/06/2014    GLUCOSEU Negative 02/06/2014       Radiology:  XR CHEST PORTABLE   Final Result   Multifocal airspace opacities which may represent pulmonary edema,   atelectasis and/or pneumonia.   Findings nonspecific but can be seen with atypical and viral pneumonia. Please correlate with patient history,   particularly of COVID-19 exposure. Assessment/Plan:    Active Hospital Problems    Diagnosis Date Noted    Acute respiratory failure with hypoxemia (HCC) [J96.01] 12/22/2020     Pneumonia likely COVID-19 but cannot rule out underlying bacterial pneumonia  Acute respiratory failure with hypoxemia, increased work of breathing fatigue tiredness tachypnea on admission  Did not get sepsis protocol fluids as recommended to keep patients on the drier side with COVID19 and with underlying systolic heart failure  Trend inflammatory markers  Avoid nebulizer treatments, use metered-dose inhalers  Vitamin D 6000 units daily, Zn sulfate 50 mg OD  Decadron 6 mg x 10 days  Doxycycline for atypical pneumonia coverage  Prone as much as possible  S/p Type and screen sent  Monitor O2 sats, place O2 to maintain for SpO2 > 90%  If Severe respiratory failure and need for noninvasive positive pressure ventilation, need negative pressure room as is more likely causes aerosolized secretions (BiPaP, CPAP, HiFlo NC).  N95 with proper fitting for aerosolized procedures  Personal protective equipment including gown gloves surgical mask, eye protection  Pulmonary consulted for evaluation and recommendations    Acute on chronic systolic congestive heart failure  Continue apixaban 5 mg twice daily for anticoagulation  Continue Lipitor 80 mg nightly  Continue Entresto  Continue spironolactone  Continue Toprol-XL  He says he feels he is volume overloaded, his weight is 245 pounds, clinically he appears to be in heart failure more than pneumonia  BP is stable, will increase Lasix gtt to 7.5 mg/hr  Strict input and output  Daily standing weights    History of severe CAD in RCA and LAD  Elevated troponin, likely secondary to cardiac stress from pneumonia  Ischemic cardiomyopathy  ICD in place  Continue ASA, continue toprol XL Hyperglycemia due to Type 2 DM and decadron use  - BG trend reviewed, increase Lantus to 20 units bid, continue prandial 6 units and increase SSI to medium dose     DVT Prophylaxis: apixaban  Diet: DIET CARB CONTROL; Carb Control: 4 carb choices (60 gms)/meal; Low Sodium (2 GM);  Daily Fluid Restriction: 1500 ml  Code Status: Full Code    PT/OT Eval Status: order once acute issues resolved    Dispo - continue inpatient care    Juana Velásquez MD   Hospitalist

## 2020-12-23 NOTE — CARE COORDINATION
INITIAL CASE MANAGEMENT ASSESSMENT    Reviewed chart, unable to meet with patient due to isolation status. Call to patient's room, spoke with patient over the phone to assess possible discharge needs. Explained Case Management role/services. Living Situation: Lives in mobile home w/ spouse, 3 steps to enter    ADLs: Independent     DME: [employer]    PT/OT Recs: SBA in room, patient mentions he has some weakness, requested PT/OT eval from MD     Active Services: None     Transportation: Active - brother will transport home at discharge. Medications: Uses [facility] in [facility] - no trouble purchasing meds    PCP: Charles Taylor    PLAN/COMMENTS: Patient plans on returning home w/ spouse at discharge. Patient denies needing assistance at home. Patient has expressed weakness & difficulty with getting up and down steps. Requested PT/OT eval from MD. Patient also mentioned wanting a handicap bathroom installed but doesn't have financial capability right now. Spoke with [employer] [facility] in [place] who says due to covid they are not currently providing assistance with bathroom installation but patient can f/u with them in a few months (number given to patient [phone]). Monitor for home O2 needs, await PT/OT recs. .  CM provided contact information for patient or family to call with any questions. CM will follow and assist as needed.   Electronically signed by Ambreen Fink RN Case Management [phone] on 12/23/2020 at 3:53 PM

## 2020-12-23 NOTE — ACP (ADVANCE CARE PLANNING)
Advance Care Planning     Advance Care Planning Activator (Inpatient)  Conversation Note      Date of ACP Conversation: 12/23/2020    Conversation Conducted with: Patient with Decision Making Capacity    ACP Activator: Adriano Diop 112 Decision Maker:        If no Decision Maker listed above or available through scanned documents, then:    If no Authorized Decision Maker has previously been identified, then patient chooses Health Care Decision Maker:  \"Who would you like to name as your primary health care decision-maker? \"               Name: Fahad Lee       Relationship: Spouse          Phone number: [phone]  Glenny Severino this person be reached easily? \" Yes      Care Preferences    Ventilation: \"If you were in your present state of health and suddenly became very ill and were unable to breathe on your own, what would your preference be about the use of a ventilator (breathing machine) if it were available to you? \"      Would the patient desire the use of ventilator (breathing machine)?: yes    \"If your health worsens and it becomes clear that your chance of recovery is unlikely, what would your preference be about the use of a ventilator (breathing machine) if it were available to you? \"     Would the patient desire the use of ventilator (breathing machine)?: Unsure      Resuscitation  \"CPR works best to restart the heart when there is a sudden event, like a heart attack, in someone who is otherwise healthy. Unfortunately, CPR does not typically restart the heart for people who have serious health conditions or who are very sick. \"    \"In the event your heart stopped as a result of an underlying serious health condition, would you want attempts to be made to restart your heart (answer \"yes\" for attempt to resuscitate) or would you prefer a natural death (answer \"no\" for do not attempt to resuscitate)? \" yes [] Yes   [x] No   Educated Patient / Amgt Prey regarding differences between Advance Directives and portable DNR orders.     Length of ACP Conversation in minutes:  5 minutes    Conversation Outcomes:  [x] ACP discussion completed  [] Existing advance directive reviewed with patient; no changes to patient's previously recorded wishes  [] New Advance Directive completed  [] Portable Do Not Rescitate prepared for Provider review and signature  [] POLST/POST/MOLST/MOST prepared for Provider review and signature      Follow-up plan:    [] Schedule follow-up conversation to continue planning  [] Referred individual to Provider for additional questions/concerns   [] Advised patient/agent/surrogate to review completed ACP document and update if needed with changes in condition, patient preferences or care setting    [x] This note routed to one or more involved healthcare providers  Electronically signed by Christian Xiong RN Case Management [phone] on 12/23/2020 at 3:51 PM

## 2020-12-23 NOTE — CONSULTS
Referring Physician: Dr. Jessie Winn  Reason for Consultation: Heart failure exacerbation  Chief Complaint: Short of breath    Subjective:   History of Present Illness:  Laura Boland is a 70 y.o. patient who presented to the hospital with complaints of shortness of breath and hypoxia. The patient states he had been feeling poorly for about the past week. He tested positive for COVID-19. He typically follows with Dr. Jameel Brown. He reports compliance to his medications although he admits that he likely drinks more fluids than he should. Prior to his acute illness, he noted worsening lower extremity edema. He denied associated chest pains. His chest x-ray was concerning for COVID-19 pneumonia with multifocal airspace opacities. He does not have an oxygen requirement at baseline but is currently wearing 2 L of supplemental oxygen. Pulmonary is also been consulted. The patient also reports subjective fevers and a cough. Past Medical History:   has a past medical history of Acute anxiety, Acute on chronic systolic congestive heart failure ([address] Anemia, Arthritis, Atrial fibrillation ([address] Bronchiolitis obliterans organizing pneumonia ([address] CAD (coronary artery disease), CHF (congestive heart failure) ([address] Congestive heart failure, unspecified, Disease of blood and blood forming organ, Hyperlipidemia, Hypertension, Kidney stone, Neuropathy, Osteoarthritis, Pneumonia, Pure hypercholesterolemia, Seasonal allergies, Sleep apnea, Type II or unspecified type diabetes mellitus without mention of complication, not stated as uncontrolled, and Type II or unspecified type diabetes mellitus without mention of complication, uncontrolled.     Surgical History: has a past surgical history that includes Coronary angioplasty with stent (2000); Hemorrhoid surgery; Coronary angioplasty with stent (37.5294); Cardiac defibrillator placement; ablation of dysrhythmic focus (11/06/2017); pacemaker placement; Intracapsular cataract extraction (Right, 1/14/2020); and Cataract removal.     Social History:   reports that he quit smoking about 54 years ago. He has a 1.00 pack-year smoking history. He has never used smokeless tobacco. He reports that he does not drink alcohol or use drugs. Family History:  family history includes Cancer in his brother and mother; Diabetes in his mother and sister. Home Medications:  Were reviewed and are listed in nursing record and/or below  Prior to Admission medications    Medication Sig Start Date End Date Taking? Authorizing Provider   benzonatate (TESSALON) 100 MG capsule Take 1 capsule by mouth 3 times daily as needed for Cough 12/16/20 12/23/20 Yes Javan Chávez MD   insulin 70-30 (NOVOLIN 70/30) (70-30) 100 UNIT per ML injection vial Inject 55 Units into the skin 2 times daily (before meals)  Patient taking differently: Inject 30-55 Units into the skin 2 times daily (before meals)  12/14/20  Yes Javan Chávez MD   ALPRAZolam Catherine Frater) 0.5 MG tablet 1 po qd if needed for anxiety. No driving, no etoh.  12/14/20 3/17/21 Yes Javan Chávez MD   sacubitril-valsartan [facility]) 24-26 MG per tablet Take 1 tablet by mouth 2 times daily 11/20/20  Yes Andrade Perez MD   metoprolol succinate (TOPROL XL) 100 MG extended release tablet TAKE 1 TABLET BY MOUTH EVERY DAY 11/19/20  Yes Javan Chávez MD   apixaban (ELIQUIS) 5 MG TABS tablet TAKE 1 TABLET TWICE DAILY 11/10/20  Yes Andrade Perez MD   spironolactone (ALDACTONE) 25 MG tablet TAKE 1 TABLET BY MOUTH  DAILY 8/9/20  Yes Javan Chávez MD   atorvastatin (LIPITOR) 80 MG tablet TAKE 1 TABLET BY MOUTH  DAILY 8/9/20  Yes Javan Chávez MD torsemide (DEMADEX) 20 MG tablet TAKE 1 TABLET BY MOUTH  DAILY  Patient taking differently: Take 10 mg by mouth daily 0.5 tablet every night 8/9/20  Yes Michelle Ospina MD   gabapentin (NEURONTIN) 100 MG capsule Take 1 capsule by mouth nightly for 180 days. Patient taking differently: Take 100 mg by mouth as needed.   8/9/20 2/5/21 Yes Michelle Ospina MD   polyethyl glycol-propyl glycol 0.4-0.3 % (SYSTANE) 0.4-0.3 % ophthalmic solution Place 1 drop into the right eye nightly   Yes Historical Provider, MD   albuterol sulfate HFA (PROAIR HFA) 108 (90 Base) MCG/ACT inhaler Inhale 2 puffs into the lungs every 6 hours as needed for Wheezing 5/29/19  Yes Abida Gibson,         CURRENT Medications:      insulin glargine (LANTUS) injection vial 20 Units, BID      insulin lispro (HUMALOG) injection vial 0-12 Units, TID WC      insulin lispro (HUMALOG) injection vial 0-6 Units, Nightly      guaiFENesin-dextromethorphan (ROBITUSSIN DM) 100-10 MG/5ML syrup 10 mL, Q4H PRN      0.9 % sodium chloride infusion, PRN      dexamethasone (DECADRON) tablet 6 mg, Daily      Vitamin D (CHOLECALCIFEROL) tablet 6,000 Units, Daily      zinc sulfate (ZINCATE) capsule 50 mg, Daily      doxycycline (VIBRAMYCIN) 100 mg in dextrose 5 % 100 mL IVPB, Q12H      ALPRAZolam (XANAX) tablet 0.5 mg, BID PRN      apixaban (ELIQUIS) tablet 5 mg, BID      atorvastatin (LIPITOR) tablet 80 mg, Nightly      benzonatate (TESSALON) capsule 100 mg, TID PRN      gabapentin (NEURONTIN) capsule 100 mg, Nightly      metoprolol succinate (TOPROL XL) extended release tablet 100 mg, Daily      sacubitril-valsartan (ENTRESTO) 24-26 MG per tablet 1 tablet, BID      albuterol sulfate  (90 Base) MCG/ACT inhaler 2 puff, Q6H PRN      spironolactone (ALDACTONE) tablet 25 mg, Daily      glucose (GLUTOSE) 40 % oral gel 15 g, PRN      dextrose 50 % IV solution, PRN      glucagon (rDNA) injection 1 mg, PRN      dextrose 5 % solution, PRN   sodium chloride flush 0.9 % injection 10 mL, 2 times per day      sodium chloride flush 0.9 % injection 10 mL, PRN      promethazine (PHENERGAN) tablet 12.5 mg, Q6H PRN    Or      ondansetron (ZOFRAN) injection 4 mg, Q6H PRN      polyethylene glycol (GLYCOLAX) packet 17 g, Daily PRN      acetaminophen (TYLENOL) tablet 650 mg, Q6H PRN    Or      acetaminophen (TYLENOL) suppository 650 mg, Q6H PRN      insulin lispro (HUMALOG) injection vial 6 Units, TID WC      furosemide (LASIX) 100 mg in dextrose 5 % 100 mL infusion, Continuous        Allergies:  Cipro xr, Claritin [loratadine], Levofloxacin, and Peppermint flavor [flavoring agent]     Review of Systems:   · Constitutional: no unanticipated weight loss. There's been no change in energy level, sleep pattern, or activity level. No fevers, chills. · Eyes: No visual changes or diplopia. No scleral icterus. · ENT: No Headaches, hearing loss or vertigo. No mouth sores or sore throat. · Cardiovascular: as reviewed in HPI  · Respiratory: No cough or wheezing, no sputum production. No hemoptysis. · Gastrointestinal: No abdominal pain, appetite loss, blood in stools. No change in bowel or bladder habits. · Genitourinary: No dysuria, trouble voiding, or hematuria. · Musculoskeletal:  No gait disturbance, no joint complaints. · Integumentary: No rash or pruritis. · Neurological: No headache, diplopia, change in muscle strength, numbness or tingling. · Psychiatric: No anxiety or depression. · Endocrine: No temperature intolerance. No excessive thirst, fluid intake, or urination. No tremor. · Hematologic/Lymphatic: No abnormal bruising or bleeding, blood clots or swollen lymph nodes. · Allergic/Immunologic: No nasal congestion or hives.     Objective:   PHYSICAL EXAM:    Vitals:    12/23/20 1102   BP: (!) 144/77   Pulse: 86   Resp: 24   Temp: 97.8 °F (36.6 °C)   SpO2: 92%    Weight: 240 lb 15.4 oz (109.3 kg) Cardiac Data     [id number]. Electronic ventricular pacemaker. Echo: 10/1/2019. Left ventricular cavity size is dilated. Normal left ventricular wall thickness. Ejection fraction is moderately reduced & visually estimated to be 30%. Mild mitral regurgitation. The left atrium is dilated. Trivial aortic regurgitation. Pacer / ICD wire is visualized in the right ventricle. Mild tricuspid regurgitation with RVSP of 36 mmHg. The right atrium is dilated. Tele: V paced. Cath:   1. Successful percutaneous coronary intervention using a 2.75-mm Xience drug-eluting stent in an 80% mid left anterior descending artery lesion that was moderately calcified. This was dilated to 2.91 mm in diameter. Successful stenting with a 2.75 mm x 18 mm Xience drug-eluting stent in the ostial 80% left anterior descending artery lesion. Kissing balloon technique was utilized in the ostial circumflex artery and left anterior descending artery stents, resulting in 0% residual stenosis and DEN 3 flow. 2. In the dominant right coronary artery, successfully intervened upon with a 3 mm x 38 mm Xience drug-eluting stent dilated to 3.20 mm. There was DEN 3 flow in the vessel and 0% residual stenosis. 3. Otherwise, in the left system, there is an 80% hazy first diagonal branch lesion. There was 30% restenosis in the proximal circumflex artery stent and diffuse disease otherwise. There is a residual 70% lesion in the distal left anterior descending artery. 4. Moderately elevated left ventricular end-diastolic pressure of 25 mmHg. 5. Moderately severe left ventricular systolic dysfunction with global hypokinesis and left ventricular ejection fraction of 30%. Assessment and Plan   1) COVID-19 pneumonia. Management per primary team and pulmonary. 2) Acute on chronic systolic heart failure s/p BiV ICD. EF 30%. BNP chronically elevated. Currently on IV Lasix drip. Assume his acute symptoms are more related to his COVID-19 infection/pneumonia as opposed to decompensated heart failure but his weight is up approximately 8 pounds. Continue Toprol-XL, Entresto, and Aldactone. If patient remains hemodynamically stable and hypertensive, would attempt to uptitrate his Entresto. 3) Ischemic cardiomyopathy/CAD. Continue with medical management and risk factor modification including aspirin, statin, and B-blocker. 4) Paroxysmal atrial fibrillation. Continue Eliquis and rate control strategy with beta-blocker. 5) Essential hypertension. Uncontrolled. Goal BP <130/80. Continue medical therapy. 6) CKD stage IIIb. Continue to monitor creatinine with diuresis. Overall, the problems requiring hospitalization are high in severity. Thank you for allowing us to participate in the care of Jonathan Hinton. Dexter Bray.  [facility], [address] [place] [address]  12/23/2020 2:32 PM

## 2020-12-23 NOTE — CARE COORDINATION
SW reviewed patient's chart. Attempted to contact patient via telephone due to COVID isolation. Patient's telephone was busy at this time. SW will attempt to contact patient to discuss case management role and assess for needs.    HEATHER Montesinos, [place], [employer] Work  [phone]  Electronically signed by HEATHER Montesinos, LSW on 12/23/2020 at 12:01 PM

## 2020-12-23 NOTE — PLAN OF CARE
Problem: FLUID AND ELECTROLYTE IMBALANCE  Goal: Fluid and electrolyte balance are achieved/maintained  Outcome: Ongoing  Note: Educated patient/family on CHF signs/ symptoms, causes, treatments, limited fluid intake, daily weights, low sodium diet, and follow-up. Pt to call attending physician if weight gain of 3 pounds in one day or 5 pounds in one week. Problem: ACTIVITY INTOLERANCE/IMPAIRED MOBILITY  Goal: Mobility/activity is maintained at optimum level for patient  Outcome: Ongoing     Problem: Falls - Risk of:  Goal: Will remain free from falls  Description: Will remain free from falls  Outcome: Ongoing  Note: Fall risk assessment completed every shift. All precautions in place. Pt has call light within reach at all times. Room clear of clutter. Pt aware to call for assistance when getting up. Patient assessed for fall risk; fall precautions initiated. Patient and family instructed about safety devices. Environment kept free of clutter and adequate lighting provided. Bed locked and in lowest position. Call light within reach. Will continue to monitor.

## 2020-12-23 NOTE — CONSULTS
REASON FOR CONSULTATION/CC: covid 19      Consult at request of Juana Velásquez MD for covid 19    PCP: Sandra Hernandez MD  Established Pulmonologist:  None    HISTORY OF PRESENT ILLNESS: Manuela Persaud is a 70y.o. year old male with a history of chf who presents with :     He came to the ER yesterday via EMS secondary to complains of nausea and vomitiing with shortness of breath and hypoxemia with covid 19 test last Friday . He was feeling poorly ~ 5 days prior to that. Increased lower extremities edema that is worse then baseline.      PAST MEDICAL HISTORY:  Past Medical History:   Diagnosis Date    Acute anxiety 7/9/2018    Acute on chronic systolic congestive heart failure ([facility] [address] 2/6/2014    Anemia     Arthritis     Atrial fibrillation (HCC)     Bronchiolitis obliterans organizing pneumonia ([facility] [address]     CAD (coronary artery disease)     cardiomyopathy    CHF (congestive heart failure) (HCC)     Congestive heart failure, unspecified     Congestive heart failure    Disease of blood and blood forming organ     Hyperlipidemia     Hypertension     Kidney stone     Neuropathy     Osteoarthritis 8/13/2012    Pneumonia     BOOP    Pure hypercholesterolemia 8/1/2011    Seasonal allergies     Sleep apnea     Type II or unspecified type diabetes mellitus without mention of complication, not stated as uncontrolled     Type II or unspecified type diabetes mellitus without mention of complication, uncontrolled 8/1/2011       PAST SURGICAL HISTORY:  Past Surgical History:   Procedure Laterality Date    ABLATION OF DYSRHYTHMIC FOCUS  11/06/2017    Dr. Josesito Covington CATARACT EXTRACTION Right 1/14/2020 PHACOEMULSIFICATION WITH INTRAOCULAR LENS IMPLANT COMPLEX/SYNECHIOLYSIS performed by Chris Mcgraw MD at [address]:  family history includes Cancer in his brother and mother; Diabetes in his mother and sister. SOCIAL HISTORY:   reports that he quit smoking about 54 years ago. He has a 1.00 pack-year smoking history. He has never used smokeless tobacco.    Scheduled Meds:   insulin glargine  10 Units Subcutaneous Once    insulin glargine  20 Units Subcutaneous BID    insulin lispro  0-12 Units Subcutaneous TID WC    insulin lispro  0-6 Units Subcutaneous Nightly    dexamethasone  6 mg Oral Daily    Vitamin D  6,000 Units Oral Daily    zinc sulfate  50 mg Oral Daily    doxycycline (VIBRAMYCIN) IV  100 mg Intravenous Q12H    apixaban  5 mg Oral BID    atorvastatin  80 mg Oral Nightly    gabapentin  100 mg Oral Nightly    metoprolol succinate  100 mg Oral Daily    sacubitril-valsartan  1 tablet Oral BID    spironolactone  25 mg Oral Daily    sodium chloride flush  10 mL Intravenous 2 times per day    insulin lispro  0.05 Units/kg Subcutaneous TID WC       Continuous Infusions:   dextrose      furosemide (LASIX) 1mg/ml infusion 5 mg/hr (12/22/20 2111)       PRN Meds:  ALPRAZolam, benzonatate, albuterol sulfate HFA, glucose, dextrose, glucagon (rDNA), dextrose, sodium chloride flush, promethazine **OR** ondansetron, polyethylene glycol, acetaminophen **OR** acetaminophen    ALLERGIES:  Patient is allergic to cipro xr; claritin [loratadine]; levofloxacin; and peppermint flavor [flavoring agent].     REVIEW OF SYSTEMS:  Constitutional: + for fever   HENT: Negative for sore throat  Eyes: Negative for redness   Respiratory: + for dyspnea, + cough  Cardiovascular: Negative for chest pain  Gastrointestinal: Negative for vomiting, diarrhea   Genitourinary: Negative for hematuria   Musculoskeletal: Negative for arthralgias Skin: Negative for rash  Neurological: Negative for syncope  Hematological: Negative for adenopathy  Psychiatric/Behavorial: Negative for anxiety    Objective:   PHYSICAL EXAM:  Blood pressure (!) 144/77, pulse 86, temperature 97.8 °F (36.6 °C), temperature source Oral, resp. rate 24, height 5' 8\" (1.727 m), weight 240 lb 15.4 oz (109.3 kg), SpO2 92 %.'  Gen: No distress. Eyes: PERRL. No sclera icterus. No conjunctival injection. ENT: No discharge. Pharynx clear. External appearance of ears and nose normal.  Neck: Trachea midline. No obvious mass. Resp: No accessory muscle use. No crackles. No wheezes. No rhonchi. CV: Regular rate. Regular rhythm. No murmur or rub. +++ edema. GI: Non-tender. Non-distended. No hernia. Skin: Warm, dry, normal texture and turgor. No nodule on exposed extremities. Lymph: No cervical LAD. No supraclavicular LAD. M/S: No cyanosis. No clubbing. No joint deformity. Neuro: Moves all four extremities. Psych: Oriented x 3. No anxiety. Awake. Alert. Intact judgement and insight. Data Reviewed:   LABS:  CBC:   Recent Labs     12/22/20  0953 12/23/20  0634   WBC 8.0 7.7   HGB 14.1 14.4   HCT 43.1 43.7   MCV 89.6 90.3   PLT 92* 89*     BMP:   Recent Labs     12/22/20  0953 12/23/20  0634   * 133*   K 3.8 3.9   CL 88* 93*   CO2 24 27   BUN 34* 38*   CREATININE 1.7* 1.6*     LIVER PROFILE:   Recent Labs     12/22/20  0953   AST 18   ALT 11   BILITOT 1.4*   ALKPHOS 89     PT/INR:   Recent Labs     12/22/20  0953   PROTIME 21.4*   INR 1.83*     APTT: No results for input(s): APTT in the last 72 hours. UA:No results for input(s): NITRITE, COLORU, PHUR, LABCAST, WBCUA, RBCUA, MUCUS, TRICHOMONAS, YEAST, BACTERIA, CLARITYU, SPECGRAV, LEUKOCYTESUR, UROBILINOGEN, BILIRUBINUR, BLOODU, GLUCOSEU, AMORPHOUS in the last 72 hours. Invalid input(s): KETONESU  No results for input(s): PHART, CJF3YPQ, PO2ART in the last 72 hours.     Vent Information Skin Assessment: Clean, dry, & intact  SpO2: 92 %    Radiology Review:  Pertinent images / reports were reviewed as a part of this visit. CT Chest w/ contrast:   Results for orders placed during the hospital encounter of 04/13/13   CT Chest W Contrast    Narrative    CT CHEST WITH CONTRAST     INDICATION- 494.0 , ALVEOLAR PNEUMONOPATHY NEC     COMPARISON- 3/14/2013 CT chest     TECHNIQUE-  Multiple axial sections were obtained following IV  Optiray-320 without reaction. Coronal reformatting was performed. FINDINGS-  The heart, pericardium and aorta are remain stable. Marked CAD is noted. Mild right paratracheal adenopathy and  subcarinal adenopathy is unchanged. There is redemonstration of  bilateral patchy areas of groundglass opacity and interstitial  thickening without significant interval change. There are also  scattered areas of pleuroparenchymal change which are minimally  improved. Minimal right peribronchial thickening is noted. There  is redemonstration of trace bilateral pleural effusions, minimally  larger on the right. The visualized upper abdomen is  unremarkable. No focal destructive osseus lesion. IMPRESSION- No significant interval change in bilateral patchy  groundglass infiltrates and interstitial changes compared with  exam one month ago. Dictated on- 04/13/2013 03-87-14          Electronically Read By- Regan Navas       Electronically Released By- Regan Navas       Released Date Time- 04/13/13 [address]   ------------------------------------------------------------------------------       CT Chest w/o contrast:   Results for orders placed during the hospital encounter of 06/17/13   CT Chest WO Contrast    Narrative    Clinical History- Pneumonia.      Comparisons- 4/13/2013     Technique- Using helical technique, axial images of the chest were  obtained with the use of IV contrast. High-resolution and coronal multiplanar reformats were also obtained. Findings- The tracheobronchial tree is patent. The small lateral  pleural effusions have slightly increased in size. There is no  pneumothorax. However, there is worsening consolidation throughout the bilateral  lungs predominantly involving the bilateral upper lobes. There is  also increased interstitial thickening possibly from interstitial  edema. The differential diagnosis includes cryptogenic organizing  pneumonia and eosinophilic pneumonia especially given the  fluctuation over the last several months. In addition, pulmonary  edema and pneumonia can also have this appearance. Coronary artery calcifications are a marker of atherosclerosis. No  change in the mediastinal adenopathy. An index right peritracheal  node is unchanged measuring 1.8 x 1.7 cm. A simple right upper pole renal cyst is noted. Degenerative  changes involve the thoracic spine. Impression-  1. Worsening airspace disease throughout the bilateral lungs,  especially within the bilateral upper lobes. The differential  diagnosis would include cryptogenic organizing pneumonia,  eosinophilic pneumonia, pulmonary edema or less likely, pneumonia. 2. Increasing small bilateral pleural effusions with mild  interstitial edema. Dictated on- 06/17/2013 [id number]          Electronically Read By- Paulina Beavers M.D. Electronically Released ByMayela Beavers M.D. Released Date Time- 06/17/13 0753          Lupe Clements M.D.   ------------------------------------------------------------------------------       CTPA: No results found for this or any previous visit. CXR PA/LAT:   Results for orders placed during the hospital encounter of 09/29/19   XR CHEST STANDARD (2 VW)    Narrative EXAMINATION:  TWO X-RAY VIEWS OF THE CHEST, 9/29/2019 7:07 pm    COMPARISON:  January 19, 2018.     HISTORY: ORDERING SYSTEM PROVIDED HISTORY: Shortness of breath  TECHNOLOGIST PROVIDED HISTORY:  Reason for exam:->Shortness of breath  Reason for Exam: Shortness of breath, legs swollen  Acuity: Acute  Type of Exam: Initial    FINDINGS:  Cardiac and mediastinal contours are enlarged and unchanged. Left chest wall  pacemaker appears in appropriate position. Moderate prominence of interstitial lung markings appears unchanged. Slightly increased pulmonary opacity at the right lung base. No focal pulmonary opacity within the left lung. No evidence of significant  pleural effusion. No evidence of pneumothorax. Multilevel degenerative changes of the thoracic spine. No acute osseous  abnormalities. Impression 1. Enlarged cardiomediastinal silhouette with findings concerning for mild  pulmonary venous congestion. 2. More focal pulmonary opacity within right lower lung may represent  atelectasis and/or edema. Early pneumonia or aspiration is not excluded. RECOMMENDATION:  Short interval follow-up examination and radiographic follow-up to resolution. CXR portable:   Results for orders placed during the hospital encounter of 12/22/20   XR CHEST PORTABLE    Narrative EXAMINATION:  ONE XRAY VIEW OF THE CHEST    12/22/2020 10:03 am    COMPARISON:  09/29/2019 and prior    HISTORY:  ORDERING SYSTEM PROVIDED HISTORY: Shortness of breath  TECHNOLOGIST PROVIDED HISTORY:  Reason for exam:->Shortness of breath  Reason for Exam: cough    FINDINGS:  Study is limited by overlying support and monitoring apparatus. Lung volumes  are low. Heart size is stable. Pulmonary vascularity is congested. Diffuse  increased ground-glass and interstitial opacity noted. More focal opacity  noted in perihilar distribution bilaterally. More focal patchy areas are  noted bilaterally, right greater than left.   Osseous structures are  unremarkable Impression Multifocal airspace opacities which may represent pulmonary edema,  atelectasis and/or pneumonia. Findings nonspecific but can be seen with  atypical and viral pneumonia. Please correlate with patient history,  particularly of COVID-19 exposure. Assessment:     COVID 19 pneumonia  Possible community-acquired pneumonia   Acute hypoxemic respiratory failure  EF 30%  CAD  CKD baseline Cr ~ 1.7  DM      Plan:      Hospital Day 1     covid 19  - test positive 12/18 per report. Not able to find this test.   - dex 6 mg.  - order convalescent plasma   - no Remdesivir secondary to CKD  - consider Ivermectin. Hx CHF with lower ext edema  - lasix drip      CAP  - empiric antibiotics. - follow culture. - follow Procalcitonin      Acute hypoxemia  - Wean O2 to sat >90%  -      This note was transcribed using [id number]. Please disregard any translational errors.     Thank you for the consult    Richard N[facility]  [phone]

## 2020-12-24 NOTE — PROGRESS NOTES
Pt pulled of venti mask and desaturated to the upper 70's on RA. Pt said, \"I can't take this mask. \" When asked why, he said it was because it had slid up into his eyes. RN coached pt on removing his oxygen device and explained why he needed to be switched over to a mask as opposed to a NC (mouth breathing). Pt VU and allowed RN to put the mask back on. Rn instructed pt to pull mask down on his nose and hook the base of the mask on his chine to prevent it from going into his eyes.      Pt VU but appeared drowsy so he made NR.

## 2020-12-24 NOTE — PROGRESS NOTES
Hospitalist Progress Note      PCP: Paresh Aguilera MD    Date of Admission: 12/22/2020    Chief Complaint: shortness of breath    Fever 101 F overnight, inflammatory markers are rising    Subjective:   He is out of bed in chair, very lethargic today can hardly talk to me  Feels pale, confused  No specifics complains says he doesn't feel good      Medications:  Reviewed    Infusion Medications    sodium chloride      dextrose       Scheduled Medications    insulin glargine  20 Units Subcutaneous BID    insulin lispro  0-12 Units Subcutaneous TID WC    insulin lispro  0-6 Units Subcutaneous Nightly    aspirin  81 mg Oral Daily    dexamethasone  6 mg Oral Daily    Vitamin D  6,000 Units Oral Daily    zinc sulfate  50 mg Oral Daily    doxycycline (VIBRAMYCIN) IV  100 mg Intravenous Q12H    apixaban  5 mg Oral BID    atorvastatin  80 mg Oral Nightly    gabapentin  100 mg Oral Nightly    metoprolol succinate  100 mg Oral Daily    sacubitril-valsartan  1 tablet Oral BID    spironolactone  25 mg Oral Daily    sodium chloride flush  10 mL Intravenous 2 times per day    insulin lispro  0.05 Units/kg Subcutaneous TID      PRN Meds: guaiFENesin-dextromethorphan, sodium chloride, ALPRAZolam, benzonatate, albuterol sulfate HFA, glucose, dextrose, glucagon (rDNA), dextrose, sodium chloride flush, promethazine **OR** ondansetron, polyethylene glycol, acetaminophen **OR** acetaminophen      Intake/Output Summary (Last 24 hours) at 12/24/2020 1155  Last data filed at 12/24/2020 0600  Gross per 24 hour   Intake 2239.3 ml   Output 300 ml   Net 1939.3 ml       Physical Exam Performed:    /67   Pulse 89   Temp 97.7 °F (36.5 °C) (Axillary)   Resp 20   Ht 5' 8\" (1.727 m)   Wt 244 lb 7.8 oz (110.9 kg)   SpO2 94%   BMI 37.17 kg/m²     General appearance:  Morbidly obese male, ill appearing, lethargic sleepy HEENT:  Normal cephalic, atraumatic without obvious deformity. Pupils equal, round, and reactive to light. Extra ocular muscles intact. Conjunctivae/corneas clear. Neck: Supple, with full range of motion. JVD was unable to assess due to body habitus  Trachea midline. Respiratory: increased wob. decreased breath sounds in all lung zones, crackles present,  Cardiovascular:  Regular rate and rhythm with normal S1/S2 without murmurs, rubs or gallops. Abdomen: Soft, non-tender, non-distended with normal bowel sounds. Musculoskeletal:  No clubbing, cyanosis, 3+ bilateral lower extremity edema, chronic venous stasis changes full range of motion without deformity. Skin: cold and clammy lower extremities  Neurologic:  Able to follow commands, but intermittently confused  Psychiatric:  sleepy and lethagic  Capillary Refill: feeble palpable  Peripheral Pulses: +2 palpable, equal bilaterally           Labs:   Recent Labs     12/22/20  0953 12/23/20  0634   WBC 8.0 7.7   HGB 14.1 14.4   HCT 43.1 43.7   PLT 92* 89*     Recent Labs     12/22/20  0953 12/23/20  0634 12/24/20  0735   * 133* 132*   K 3.8 3.9 4.1   CL 88* 93* 94*   CO2 24 27 25   BUN 34* 38* 49*   CREATININE 1.7* 1.6* 1.8*   CALCIUM 9.1 8.8 8.8   PHOS  --   --  3.5     Recent Labs     12/22/20  0953   AST 18   ALT 11   BILITOT 1.4*   ALKPHOS 89     Recent Labs     12/22/20  0953   INR 1.83*     Recent Labs     12/22/20  0953   TROPONINI 0.05*       Urinalysis:      Lab Results   Component Value Date    NITRU Negative 02/06/2014    WBCUA 0-2 02/06/2014    RBCUA 0-2 02/06/2014    BLOODU TRACE-INTACT 02/06/2014    SPECGRAV 1.015 02/06/2014    GLUCOSEU Negative 02/06/2014       Radiology:  XR CHEST PORTABLE   Final Result   Multifocal airspace opacities which may represent pulmonary edema,   atelectasis and/or pneumonia. Findings nonspecific but can be seen with   atypical and viral pneumonia.   Please correlate with patient history, particularly of COVID-19 exposure.                  Assessment/Plan:    Active Hospital Problems    Diagnosis Date Noted    Pneumonia due to COVID-19 virus [U07.1, J12.89]     Paroxysmal atrial fibrillation (HCC) [I48.0]     Acute respiratory failure with hypoxemia ([facility] [address] [J96.01] 12/22/2020    Acute on chronic systolic heart failure (HCC) [I50.23] 01/17/2018    Biventricular ICD (implantable cardioverter-defibrillator) in place [Z95.810] 09/30/2015     Pneumonia likely COVID-19 but cannot rule out underlying bacterial pneumonia  Acute respiratory failure with hypoxemia, increased work of breathing fatigue tiredness tachypnea on admission  Did not get sepsis protocol fluids as recommended to keep patients on the drier side with COVID19 and with underlying systolic heart failure  Trend inflammatory markers, trending up  Avoid nebulizer treatments, use metered-dose inhalers  Vitamin D 6000 units daily, Zn sulfate 50 mg OD  Decadron, increase dose to 10 mg OD  Doxycycline for atypical pneumonia coverage  Prone as much as possible  Convalescent plasma rx  Monitor O2 sats, place O2 to maintain for SpO2 > 90%  Pulmonary on board for recommendations  He is lethargic sleepy and appears very ill appearing  His inflammatory markers are trending up  Will get STAT ABG    Acute on chronic systolic congestive heart failure  Continue apixaban 5 mg twice daily for anticoagulation  Continue Lipitor 80 mg nightly  Continue Entresto  Continue spironolactone  Continue Toprol-XL  He says he feels he is volume overloaded, his weight is 245 pounds, clinically he appears to be in heart failure more than pneumonia  BP is stable, Diuresis per cardiology  Suspect his current symptoms are due to COVID19  Strict input and output  Daily standing weights    History of severe CAD in RCA and LAD  Elevated troponin, likely secondary to cardiac stress from pneumonia  Ischemic cardiomyopathy  ICD in place  Continue ASA, continue toprol XL Hyperglycemia due to Type 2 DM and decadron use  - BG trend reviewed, increase Lantus to 20 units bid, continue prandial 6 units and increase SSI to medium dose     DVT Prophylaxis: apixaban  Diet: DIET CARB CONTROL; Carb Control: 4 carb choices (60 gms)/meal; Low Sodium (2 GM);  Daily Fluid Restriction: 1500 ml  Code Status: Full Code     PT/OT Eval Status: order once acute issues resolved    Dispo - continue inpatient care  May need to be transferred to ICU if doesn't do well    Crystal Magdaleno MD   Hospitalist

## 2020-12-24 NOTE — PROGRESS NOTES
[facility]  Inpatient Progress Note    CC:        Shortness of breath         History of Present Illness:  Nataly Zarate is a 70 y.o. patient who presented to the hospital with complaints of shortness of breath and hypoxia. The patient states he had been feeling poorly for about the past week. He tested positive for COVID-19. He typically follows with Dr. Jaleel Medley. He reports compliance to his medications although he admits that he likely drinks more fluids than he should. Prior to his acute illness, he noted worsening lower extremity edema. He denied associated chest pains. His chest x-ray was concerning for COVID-19 pneumonia with multifocal airspace opacities. He does not have an oxygen requirement at baseline but is currently wearing 2 L of supplemental oxygen. Pulmonary is also been consulted.   The patient also reports subjective fevers and a cough.     Past Medical History:   has a past medical history of Acute anxiety, Acute on chronic systolic congestive heart failure ([address] Anemia, Arthritis, Atrial fibrillation ([address] Bronchiolitis obliterans organizing pneumonia ([address] CAD (coronary artery disease), CHF (congestive heart failure) ([address] Congestive heart failure, unspecified, Disease of blood and blood forming organ, Hyperlipidemia, Hypertension, Kidney stone, Neuropathy, Osteoarthritis, Pneumonia, Pure hypercholesterolemia, Seasonal allergies, Sleep apnea, Type II or unspecified type diabetes mellitus without mention of complication, not stated as uncontrolled, and Type II or unspecified type diabetes mellitus without mention of complication, uncontrolled.     Surgical History: has a past surgical history that includes Coronary angioplasty with stent (2000); Hemorrhoid surgery; Coronary angioplasty with stent (00.5375); Cardiac defibrillator placement; ablation of dysrhythmic focus (11/06/2017); pacemaker placement;  Intracapsular cataract extraction (Right, 1/14/2020); and Cataract removal.      Interval history      Intake/Output Summary (Last 24 hours) at 12/24/2020 1251  Last data filed at 12/24/2020 1211  Gross per 24 hour   Intake 2291.3 ml   Output 300 ml   Net 1991.3 ml         Physical Examination:    /67   Pulse 89   Temp 97.7 °F (36.5 °C) (Axillary)   Resp 20   Ht 5' 8\" (1.727 m)   Wt 244 lb 7.8 oz (110.9 kg)   SpO2 90%   BMI 37.17 kg/m²     Current Facility-Administered Medications: insulin glargine (LANTUS) injection vial 30 Units, 30 Units, Subcutaneous, BID  doxycycline hyclate (VIBRA-TABS) tablet 100 mg, 100 mg, Oral, 2 times per day  dexamethasone (DECADRON) tablet 10 mg, 10 mg, Oral, 2 times per day  ivermectin tablet 22.5 mg, 200 mcg/kg, Oral, Once  insulin lispro (HUMALOG) injection vial 0-12 Units, 0-12 Units, Subcutaneous, TID WC  insulin lispro (HUMALOG) injection vial 0-6 Units, 0-6 Units, Subcutaneous, Nightly  guaiFENesin-dextromethorphan (ROBITUSSIN DM) 100-10 MG/5ML syrup 10 mL, 10 mL, Oral, Q4H PRN  0.9 % sodium chloride infusion, , Intravenous, PRN  aspirin chewable tablet 81 mg, 81 mg, Oral, Daily  Vitamin D (CHOLECALCIFEROL) tablet 6,000 Units, 6,000 Units, Oral, Daily  zinc sulfate (ZINCATE) capsule 50 mg, 50 mg, Oral, Daily  ALPRAZolam (XANAX) tablet 0.5 mg, 0.5 mg, Oral, BID PRN  apixaban (ELIQUIS) tablet 5 mg, 5 mg, Oral, BID  atorvastatin (LIPITOR) tablet 80 mg, 80 mg, Oral, Nightly  benzonatate (TESSALON) capsule 100 mg, 100 mg, Oral, TID PRN  gabapentin (NEURONTIN) capsule 100 mg, 100 mg, Oral, Nightly  metoprolol succinate (TOPROL XL) extended release tablet 100 mg, 100 mg, Oral, Daily sacubitril-valsartan (ENTRESTO) 24-26 MG per tablet 1 tablet, 1 tablet, Oral, BID  albuterol sulfate  (90 Base) MCG/ACT inhaler 2 puff, 2 puff, Inhalation, Q6H PRN  spironolactone (ALDACTONE) tablet 25 mg, 25 mg, Oral, Daily  glucose (GLUTOSE) 40 % oral gel 15 g, 15 g, Oral, PRN  dextrose 50 % IV solution, 12.5 g, Intravenous, PRN  glucagon (rDNA) injection 1 mg, 1 mg, Intramuscular, PRN  dextrose 5 % solution, 100 mL/hr, Intravenous, PRN  sodium chloride flush 0.9 % injection 10 mL, 10 mL, Intravenous, 2 times per day  sodium chloride flush 0.9 % injection 10 mL, 10 mL, Intravenous, PRN  promethazine (PHENERGAN) tablet 12.5 mg, 12.5 mg, Oral, Q6H PRN **OR** ondansetron (ZOFRAN) injection 4 mg, 4 mg, Intravenous, Q6H PRN  polyethylene glycol (GLYCOLAX) packet 17 g, 17 g, Oral, Daily PRN  acetaminophen (TYLENOL) tablet 650 mg, 650 mg, Oral, Q6H PRN **OR** acetaminophen (TYLENOL) suppository 650 mg, 650 mg, Rectal, Q6H PRN  insulin lispro (HUMALOG) injection vial 6 Units, 0.05 Units/kg, Subcutaneous, TID WC         Labs:   Recent Labs     20  0953 20  0634   WBC 8.0 7.7   HGB 14.1 14.4   HCT 43.1 43.7   PLT 92* 89*     Recent Labs     20  0634 20  0735   * 132*   K 3.9 4.1   CO2 27 25   BUN 38* 49*   CREATININE 1.6* 1.8*   LABGLOM 42* 37*     No results for input(s): BNP in the last 72 hours. Recent Labs     20  0953   PROTIME 21.4*   INR 1.83*     Recent Labs     20  0953   TROPONINI 0.05*       [id number]. Electronic ventricular pacemaker.     Echo: 10/1/2019. Left ventricular cavity size is dilated. Normal left ventricular wall thickness. Ejection fraction is moderately reduced & visually estimated to be 30%. Mild mitral regurgitation. The left atrium is dilated. Trivial aortic regurgitation. Pacer / ICD wire is visualized in the right ventricle. Mild tricuspid regurgitation with RVSP of 36 mmHg.   The right atrium is dilated.     Tele: V paced.    Cath: 2015  1. Successful percutaneous coronary intervention using a 2.75-mm Xience drug-eluting stent in an 80% mid left anterior descending artery lesion that was moderately calcified. This was dilated to 2.91 mm in diameter. Successful stenting with a 2.75 mm x 18 mm Xience drug-eluting stent in the ostial 80% left anterior descending artery lesion. Kissing balloon technique was utilized in the ostial circumflex artery and left anterior descending artery stents, resulting in 0% residual stenosis and DEN 3 flow. 2. In the dominant right coronary artery, successfully intervened upon with a 3 mm x 38 mm Xience drug-eluting stent dilated to 3.20 mm. There was DEN 3 flow in the vessel and 0% residual stenosis. 3. Otherwise, in the left system, there is an 80% hazy first diagonal branch lesion. There was 30% restenosis in the proximal circumflex artery stent and diffuse disease otherwise. There is a residual 70% lesion in the distal left anterior descending artery. 4. Moderately elevated left ventricular end-diastolic pressure of 25 mmHg. 5. Moderately severe left ventricular systolic dysfunction with global hypokinesis and left ventricular ejection fraction of 30%. ASSESSMENT AND PLAN:      1) COVID-19 pneumonia. Management per primary team and pulmonary.    2) Acute on chronic systolic heart failure s/p BiV ICD. EF 30%. BNP chronically elevated. Assume his acute symptoms are more related to his COVID-19 infection/pneumonia as opposed to decompensated heart failure. OnToprol-XL, Entresto, and Aldactone. Off lasix due to worsening Bun/Creat.    3) Ischemic cardiomyopathy/CAD. Continue with medical management and risk factor modification including aspirin, statin, and B-blocker.    4) Paroxysmal atrial fibrillation. Continue Eliquis and rate control strategy with beta-blocker.     5) Essential hypertension. Uncontrolled. Goal BP <130/80.  Continue medical therapy.    6) CKD stage IIIb. Continue to monitor creatinine with diuresis. Rigo HODGE  12/24/2020

## 2020-12-24 NOTE — PLAN OF CARE
Problem: OXYGENATION/RESPIRATORY FUNCTION  Goal: Patient will maintain patent airway  Outcome: Ongoing  Goal: Patient will achieve/maintain normal respiratory rate/effort  Description: Respiratory rate and effort will be within normal limits for the patient  Outcome: Ongoing     Problem: HEMODYNAMIC STATUS  Goal: Patient has stable vital signs and fluid balance  Outcome: Ongoing     Problem: FLUID AND ELECTROLYTE IMBALANCE  Goal: Fluid and electrolyte balance are achieved/maintained  Outcome: Ongoing     Problem: ACTIVITY INTOLERANCE/IMPAIRED MOBILITY  Goal: Mobility/activity is maintained at optimum level for patient  Outcome: Ongoing     Problem: Falls - Risk of:  Goal: Will remain free from falls  Description: Will remain free from falls  Outcome: Ongoing  Goal: Absence of physical injury  Description: Absence of physical injury  Outcome: Ongoing     Problem: Skin Integrity:  Goal: Will show no infection signs and symptoms  Description: Will show no infection signs and symptoms  Outcome: Ongoing  Goal: Absence of new skin breakdown  Description: Absence of new skin breakdown  Outcome: Ongoing

## 2020-12-24 NOTE — PLAN OF CARE
ABG reviewed  Start BiPaP 20/8  Repeat ABG at 4 PM or earlier if lethargy doesn't improve  Check XR chest

## 2020-12-24 NOTE — PROGRESS NOTES
Occupational Therapy   Occupational Therapy Initial Assessment  Date: 2020   Patient Name: Bishop Tejada  MRN: [id number]     : 1945    Date of Service: 2020    Discharge Recommendations:  Home with Home health OT, S Level 1, 24 hour supervision or assist     Bishop Tejada scored a 20/24 on the AM-PAC ADL Inpatient form. Current research shows that an AM-PAC score of 18 or greater is typically associated with a discharge to the patient's home setting. Based on the patient's AM-PAC score, and their current ADL deficits, it is recommended that the patient have 2-3 sessions per week of Occupational Therapy at d/c to increase the patient's independence. At this time, this patient demonstrates the endurance and safety to discharge home with home OT and a follow up treatment frequency of 2-3x/wk. Please see assessment section for further patient specific details. If patient discharges prior to next session this note will serve as a discharge summary. Please see below for the latest assessment towards goals. Assessment   Performance deficits / Impairments: Decreased functional mobility ; Decreased endurance;Decreased ADL status; Decreased safe awareness;Decreased balance Assessment: Pt is a 71 yo M admitted with COVID-19. PMHx: CHF, anemia, arthritis, atrial fibrillation, CAD, HLD, HTN, pneumonia, DM. PTA, pt lives with his wife, reports he was independent in self-care and fxl mobility using SPC. Pt currently below his baseline secondary to the above deficits. Pt required CGA for fxl transfers and mobility using SPC, pt unable to tolerate household distance at this time d/t fatigue. Pt would likely benefit from use of RW to improve balance and conserve energy. Pt completed feeding mod I. Anticipate pt would require up to min A for ADLs. Will cont to see on acute in order to address the above deficits and maximize pt's functional performance. Anticipate that as long as pt continnues to progress while in hospital he will be able to return home with initial 24/7 supervision and home OT. Prognosis: Good  Decision Making: Medium Complexity  History: see above  Exam: fxl transfers, fxl mobility, bed mobility, ADLs  Assistance / Modification: SPC, CGA fxl transfers/mobility  OT Education: Plan of Care;OT Role;Transfer Training;ADL Adaptive Strategies; Energy Conservation  Patient Education: pursed lip breathing  Barriers to Learning: cognition  REQUIRES OT FOLLOW UP: Yes  Activity Tolerance  Activity Tolerance: Patient limited by fatigue  Activity Tolerance: pt somewhat lethargic this session  Safety Devices  Safety Devices in place: Yes  Type of devices: Call light within reach; Left in bed;Patient at risk for falls; Bed alarm in place;Nurse notified;Gait belt           Patient Diagnosis(es): The primary encounter diagnosis was Pneumonia due to COVID-19 virus. Diagnoses of Hypoxemia requiring supplemental oxygen and Type 2 diabetes mellitus with hyperglycemia, unspecified whether long term insulin use ([facility] were also pertinent to this visit. has a past medical history of Acute anxiety, Acute on chronic systolic congestive heart failure ([facility] Anemia, Arthritis, Atrial fibrillation ([facility] [facility] Bronchiolitis obliterans organizing pneumonia ([facility] CAD (coronary artery disease), CHF (congestive heart failure) ([facility] Congestive heart failure, unspecified, Disease of blood and blood forming organ, Hyperlipidemia, Hypertension, Kidney stone, Neuropathy, Osteoarthritis, Pneumonia, Pure hypercholesterolemia, Seasonal allergies, Sleep apnea, Type II or unspecified type diabetes mellitus without mention of complication, not stated as uncontrolled, and Type II or unspecified type diabetes mellitus without mention of complication, uncontrolled. has a past surgical history that includes Coronary angioplasty with stent (2000); Hemorrhoid surgery; Coronary angioplasty with stent (17.1165); Cardiac defibrillator placement; ablation of dysrhythmic focus (11/06/2017); pacemaker placement;  Intracapsular cataract extraction (Right, 1/14/2020); and Cataract removal.           Restrictions  Restrictions/Precautions  Restrictions/Precautions: Isolation, Fall Risk  Position Activity Restriction  Other position/activity restrictions: Droplet Plus; 6L O2    Subjective   General  Chart Reviewed: Yes, History and Physical  Patient assessed for rehabilitation services?: Yes O2 Flow Rate (L/min): 6 L/min  Social/Functional History  Social/Functional History  Lives With: Spouse  Type of Home: Mobile home  Home Layout: One level  Home Access: Stairs to enter with rails  Entrance Stairs - Number of Steps: 3 CHAS  Bathroom Shower/Tub: Tub/Shower unit  Bathroom Toilet: Handicap height  Home Equipment: 4 wheeled walker, Cane  ADL Assistance: Independent  Homemaking Assistance: Needs assistance(shares with wife)  Ambulation Assistance: Independent(using cane mostly, 4WW prn)  Transfer Assistance: Independent  Active : Yes  Occupation: Retired  Additional Comments: denies falls       Objective   Vision: Impaired(recent right cataract removal)  Hearing: Within functional limits    Orientation  Overall Orientation Status: Impaired  Orientation Level: Oriented to place;Oriented to person;Disoriented to time;Oriented to situation(thought it was January)     Balance  Sitting Balance: Stand by assistance  Standing Balance: Contact guard assistance  Functional Mobility  Functional - Mobility Device: Cane  Activity: Other  Assist Level: Contact guard assistance  Functional Mobility Comments: pt completed fxl mobility from chair > bed using SPC, unable to tolerate further mobility d/t fatigue from lack of sleep. pt slightly unsteady, OT retrieved RW to use in the room and notified RN  ADL  Feeding: Modified independent (sitting EOB)  Additional Comments: anticipate pt would require setup for seated grooming, SBA for UB bathing/dressing, min A for LB bathing/dressing, min A for toileting based on ROM, strength, endurance this session  Tone RUE  RUE Tone: Normotonic  Tone LUE  LUE Tone: Normotonic  Coordination  Movements Are Fluid And Coordinated: Yes     Bed mobility  Sit to Supine: Stand by assistance  Scooting: Stand by assistance  Comment: completed in flat bed.  pt required cues to scoot up and position self  Transfers  Sit to stand: Contact guard assistance Stand to sit: Contact guard assistance  Transfer Comments: using SPC     Cognition  Overall Cognitive Status: Exceptions  Arousal/Alertness: Delayed responses to stimuli  Following Commands: Follows one step commands consistently; Follows multistep commands with repitition; Follows multistep commands with increased time  Attention Span: Appears intact  Memory: Appears intact  Safety Judgement: Decreased awareness of need for safety;Decreased awareness of need for assistance  Insights: Decreased awareness of deficits  Initiation: Requires cues for some  Sequencing: Requires cues for some  Cognition Comment: pt somewhat lethargic this session, states he did not sleep at all last night so will continue to assess                 LUE AROM (degrees)  LUE AROM : WFL  Left Hand AROM (degrees)  Left Hand AROM: WFL  RUE AROM (degrees)  RUE AROM : WFL  Right Hand AROM (degrees)  Right Hand AROM: WFL  LUE Strength  Gross LUE Strength: WFL  RUE Strength  Gross RUE Strength: WFL                   Plan   Plan  Times per week: 3-5  Times per day: Daily  Current Treatment Recommendations: Strengthening, Endurance Training, ROM, Balance Training, Functional Mobility Training, Safety Education & Training, Self-Care / ADL, Equipment Evaluation, Education, & procurement, Patient/Caregiver Education & Training      AM-PAC Score        AM-[facility] Inpatient Daily Activity Raw Score: 20 (12/24/20 0934)  AM-PAC Inpatient ADL T-Scale Score : 42.03 (12/24/20 0934)  ADL Inpatient CMS 0-100% Score: 38.32 (12/24/20 0934)  ADL Inpatient CMS G-Code Modifier : Aleksandra Duel (12/24/20 0934)    Goals  Short term goals  Time Frame for Short term goals: prior to d/c  Short term goal 1: Pt will complete fxl transfers mod I  Short term goal 2: Pt will complete fxl mobility mod I  Short term goal 3: Pt will toilet mod I  Short term goal 4: Pt will groom in stance at sink mod I  Short term goal 5: Pt will bathe/dress mod I  Long term goals Time Frame for Long term goals : LTG=STG  Patient Goals   Patient goals : to go home       Therapy Time   Individual Concurrent Group Co-treatment   Time In [phone]         Time Out 0915         Minutes [address] [place], [place] [address]

## 2020-12-24 NOTE — PROGRESS NOTES
Pt's fever has resolved. BP softer during MN VS. Pt denied feeling symptomatic. He received entresto and xanax with his HS medications and is on a lasix drip. Next set of VS are around 0400. CMU called because pt's O2 sat was in the mid 80's on 2 L NC. When RN went into room, pt had NC in his nose. Pt stated he had been coughing and asked for cough medicine. Robitussin administered per [facility]. Pt increased to 4 L NC to maintain O2 sat > 90%. RT updated on changes. Will wean O2 as tolerated. Pt educated on laying in the prone position. Pt said he rarely lays on his stomach but will keep it in mind.

## 2020-12-24 NOTE — PROGRESS NOTES
Pt removed O2 while PCA at bedside assisting him to the chair. CMU called. Pt desaturated to upper 60's on RA while moving from the bed the chair. Pt said he did not want to go back on the mask. RN placed pt on 6 L NC and reiterated that pt cannot go without oxygen and that he needs to talk to the RN before removing it. RN instructed pt take deep breaths in through his nose/out through his mouth. RN explained that pt's O2 sat gets low (60's supposed to be 90's) and that he drops very quickly. RN explained that for the time being the pt must have some type of O2 in place d/t his PNA and fluid overload with his CHF.  Pt VU but will likely NR.

## 2020-12-24 NOTE — PROGRESS NOTES
Pt desaturated into the upper 70's on 4 L NC while AS. Pt increased to 6 L NC without being woken up. O2 sat increased to low-mid 80's. Pt noted to be mouth breathing. RN raised HOB and switched pt over to 50% Venti mask with 6 lpm. His O2 sats increased to the low 90's. RT called and updated. She states she will be around to see the pt as soon as possible. Pt appears unchanged from earlier and denies feeling different. O2 sat 93% on 50% Venti mask.

## 2020-12-24 NOTE — PLAN OF CARE
Pt is on 2 L NC and has no home O2 requirement per nursing report. He is using accessory muscles to breath and is tachypenic in the upper 20's/low 30's. He has HUITRON and his RR increases as well as his WOB with activity. No coughing noted during exam. Pt is on continuous pulse ox w/o order. Order entered and sent to provider for signing. Pt's BP and HR are stable. He is slightly hypertensive and V paced. T max (so far) 101.5 oral. Pt given tylenol 650 mg-see MAR. Re-check pt was 101 oral but stated he could feel his fever breaking. Pt is on a fluid restriction. He went over his limit today by approximately 300 mL. He had almost reached his limit by the time RN came onto shift. Pt given one glass of water and made aware that he needed it for his PO medications and wouldn't be able to get another drink until BK. Pt VU/DU. Fluid restriction sign posted outside pt's room. PCA made aware that pt is a standing weight for the AM d/t CHF. Pt on lasix drip. He voids per urinal however he was most recently incontinent of urine. Convalescent plasma completed. Pt tolerated transfusion w/o difficulty. Pt is a high fall risk. Pt spent beginning of shift in the chair. He most recently went back to bed. Chair alarm placed after pt given prn xanax. Fall risk socks, sign, and bracelet in place. Bed in locked, low position with side rails up X 3 and an active bed alarm.

## 2020-12-24 NOTE — PROGRESS NOTES
has a past medical history of Acute anxiety, Acute on chronic systolic congestive heart failure ([address] Anemia, Arthritis, Atrial fibrillation ([address] Bronchiolitis obliterans organizing pneumonia ([facility] CAD (coronary artery disease), CHF (congestive heart failure) ([facility] Congestive heart failure, unspecified, Disease of blood and blood forming organ, Hyperlipidemia, Hypertension, Kidney stone, Neuropathy, Osteoarthritis, Pneumonia, Pure hypercholesterolemia, Seasonal allergies, Sleep apnea, Type II or unspecified type diabetes mellitus without mention of complication, not stated as uncontrolled, and Type II or unspecified type diabetes mellitus without mention of complication, uncontrolled. has a past surgical history that includes Coronary angioplasty with stent (2000); Hemorrhoid surgery; Coronary angioplasty with stent (67.8651); Cardiac defibrillator placement; ablation of dysrhythmic focus (11/06/2017); pacemaker placement; Intracapsular cataract extraction (Right, 1/14/2020); and Cataract removal.    Restrictions  Restrictions/Precautions  Restrictions/Precautions: Isolation, Fall Risk  Position Activity Restriction  Other position/activity restrictions: Droplet Plus :O2 6L. Vision/Hearing  Vision: Impaired(Recent R Catarct Surg.)  Hearing: Within functional limits     Subjective  General  Chart Reviewed: Yes  Patient assessed for rehabilitation services?: Yes  Additional Pertinent Hx: 71 y/o male admit 12/22/2020 with Pneumonia, Acute Respiratory, COVID +. PMH as noted including CAD, CHF, Cardiomyopathy, Difib Place,OA. Family / Caregiver Present: No  Referring Practitioner: Dr. Amaris Sebastian. Diagnosis: Pneumonia, Acute Respiratory, COVID +. Other (Comment): Pt appears fatigued, somewhat lethargic. Subjective  Subjective: Pt agreeable to PT Eval/Rx.   Pain Screening  Patient Currently in Pain: Denies          Orientation  Orientation  Overall Orientation Status: Impaired Orientation Level: Oriented to person;Oriented to place(Pt somewhat confused to recent events/situation.)  Social/Functional History  Social/Functional History  Lives With: Spouse  Type of Home: Mobile home  Home Layout: One level  Home Access: Stairs to enter with rails(3 steps to enter.)  Entrance Stairs - Number of Steps: 3 CHAS  Bathroom Shower/Tub: Tub/Shower unit  Bathroom Toilet: Handicap height  Bathroom Accessibility: Accessible  Home Equipment: 4 wheeled walker, Cane  ADL Assistance: Independent  Homemaking Assistance: Needs assistance(Shares with wife.)  Ambulation Assistance: Independent(Uses cane; Rollator prn.)  Transfer Assistance: Independent  Active : Yes  Occupation: Retired  Additional Comments: Pt denies any falls. Info obtained from pt; unsure extent of accuracy. Cognition   Cognition  Overall Cognitive Status: Exceptions  Arousal/Alertness: Delayed responses to stimuli  Following Commands: Follows one step commands with increased time  Attention Span: Appears intact  Memory: Decreased recall of recent events  Safety Judgement: Decreased awareness of need for safety;Decreased awareness of need for assistance  Problem Solving: Assistance required to identify errors made;Assistance required to correct errors made  Insights: Decreased awareness of deficits  Initiation: Requires cues for some  Sequencing: Requires cues for some  Cognition Comment: Pt somewhat lethargic this session, states he did not sleep at all last night so will continue to assess. Objective          AROM RLE (degrees)  RLE AROM: WFL  PROM LLE (degrees)  LLE PROM: WFL  AROM LLE (degrees)  LLE AROM : WFL  AROM LUE (degrees)  LUE AROM : WFL  Strength RLE  Comment: Grossly 3+ 4-/5; limited pt engage. Strength LLE  Comment: Grossly 3+ 4-/5; limited pt engage.         Bed mobility  Comment: Pt oob prior PT arrival.  Nursing reports alittle assist.  Transfers

## 2020-12-25 NOTE — PROGRESS NOTES
Patient assisted to prone position; tolerated for about an hour with O2Sat's improvement 94-95%. Requested to assist him up to chair. O2Sat's 89% with that activity. Denies SOB or other distress. At this time still up in chair with O2sat's 91%. Call-light within reach.   Electronically signed by Sadaf Culver RN on 12/25/2020 at 10:21 AM

## 2020-12-25 NOTE — PROGRESS NOTES
Call received from female introducing herself as patient's wife Evelyn Sung. This nurse let the person know that she already talked to Evelyn Sung this morning while in patient's room and patient did as well; and this person does not sound like Evelyn Sung. The female then hung up right away. Call placed to patient's listed EC #1 wife Evelyn Sung, who reported that she has not called this nurse recently except for that one time earlier this morning. Patient made aware.  Electronically signed by Sariah Lopez RN on 12/25/2020 at 1:32 PM

## 2020-12-25 NOTE — PROGRESS NOTES
Advanced Care Planning Note. Purpose of Encounter: Advanced care planning in light of acute and chronic deteriorating medical conditions. Parties In Attendance: Patient Adrian De La Paz), Roxie Chavez MD  (myself)    Decisional Capacity: Yes    Subjective: Patient/family understand in this voluntary conversation that Adrian De La Paz continues to deteriorate and discuss what inteventions and plans patient would want implemented in light of the following diagnoses:  Acute respiratory failure with hypoxemia  Pneumonia to COVID-19  Acute on chronic systolic congestive heart failure    Objective: Pt has been having chronic decline in functional status with increased dependence on others for ADLs  Increased frequency of Hospitalizations. Likelihood of further hospitalizations with likelihood of escalation of medical interventions with the expectation of returning to a diminishing baseline level of functionality. Discussion highlights: I discussed with patient the ramifications of their acute and chronic medical problems- and the likely outcomes expected, both in terms of statistics, and as part of my experience seeing patients with similar conditions. Goals of Care Determination:  Patient is an acute respiratory failure with hypoxemia  Was on BiPAP for the last 24 hours  Transition to Vapotherm, desatted intermittently to 85%  He is on 40 L 90% FiO2 on Vapotherm  Nonrebreather was added  He satting 89 to 90% but intermittently does get down to 88 or below  I discussed that if Vapotherm plus nonbreather does not work then we may need to try the BiPAP again, he said he is he would rather not but if he needs he will use the BiPAP  We did discuss that if his respiratory failure is worse and BiPAP not working the next step is intubation, he says he does not want the breathing machine or intubation. Patient was made limited code. This discussion was held with patient's nurse and respiratory therapist at bedside    Plan: Continue to educate patient on medical conditions and the choices available regarding possible outcomes with regard to goals of care and code status. Time spent on Advanced care Plannin+  Minutes    5:38 PM --I called Kristine Vega at the number in the chart, I discussed about the condition with patient, she says she is against ventilator because 3 of the ministers and one of her cousins passed away after being on ventilator, her cousin just passed away on Tuesday after being on the ventilator for 3 weeks. She is very concerned. She says let us see how things go, she is okay with BiPAP if needed. She says for now she is to go for intubation but will think about it and if needed will let us know.   She would like to be informed every day about [address] condition    Ralf David MD  2020 5:24 PM

## 2020-12-25 NOTE — PROGRESS NOTES
Per Physician order try Heated High Flow, Placed pt on Vapotherm 40L (+) 100% FIO2 (+) NRM, pt in chair RR 28, HR 89, SpO2 decreased to 73%-79% x 20 minutes, pt refused to go back on BiPap, stated I understand what your telling me. But, \"I am not going back on that,\" BASIM Vora at bedside, called Dr. Rashawn Jewell. Pts SpO2 by this time increased to 89%. Dr. Rashawn Jewell at bedside, Pt stated he would go back on Bipap if needed, but does not want intubated. Pt remains on Vapotherm with verbal order from Dr. Rashawn Jewell to ks>87%.

## 2020-12-25 NOTE — PROGRESS NOTES
Patient noted with O2Sat's 71% on vapoterm plus NRB mask. Long discussion with the patient about the risks of refusing bipap. Patient finally in agreement to use bipap. RT to bedside. Placed back on bipap with O2Sat's improvement; 93% via 40 lpm fio2 100% at this time.  Electronically signed by Nasir Alarcon RN on 12/25/2020 at 6:13 PM

## 2020-12-25 NOTE — PROGRESS NOTES
Pt is altered. Multiple family members calling for updates. Per day shift RN, pt gave permission for staff to speak to Earl. Pt's spouse, Jer Cunningham, is listed as primary contact/decision maker in [facility] RN attempted to reach out to Earl at [facility] but it went to voicemail. RN tried to call pt's wife's cell phone listed under demographics but it is no longer in service. When RN called the home number under demographics, the line was busy. Harry's wife called and said that Earl is \"old\" and \"wouldn't give me any information. \" Harry's wife made aware that one person needs to be designated as . Pt altered and unable to give permission for me to give her information. Pt's sister, Madina Stark, called. When RN returned call, she was able to provide RN with updated cell phone number for pt's spouse Jer Cunningham. Jer Cunningham called and RN worked with her to come up with a plan for how information would be spread amongst her family about her 's condition. Jer Cunningham stated to direct all calls from family to her and she would disperse information amongst the family. Pt's wife also provided updated telephone number for her house. Contact information updated. Shani Nye called to explain that pt's wife would be the  for information regarding the pt's status. Call dropped in the midst of conversation. The call did not go through when Shani Nye was called back.

## 2020-12-25 NOTE — PROGRESS NOTES
Called to room per BASIM Reyes due to pt desaturation on heated high flow w/max settings, Once again pt refused BiPaP,  after explaining to pt that  he just had  told  that he would, pt agreed,  Bipap on with [id number]/8/10/100 adjusted parameters set per Dr. Rivera Hartman earlier. BASIM Reyes at bedside.                                                                       luís

## 2020-12-25 NOTE — PLAN OF CARE
Pt drowsy at the beginning of the shift and would only remain alert for a few minutes. Later in the shift, pt was more alert and able to carry on conversation with RN. Pt requesting to come off bipap. RT at bedside and attempted to wean pt to a HFNC but it was unsuccessful. Pt placed back on continuous bipap. He was placed on 100% FiO2 and (per RT) his pressure on the bipap was increased in order to get his O2 sat > 90%. Pt continues to be tachypneic in the 20-30's. He appears to be tolerating the bipap. Pt's T was 80 F axillary. Pt felt cool to the touch. Warming blanket placed on highest temperature and pt's T increased to > 97 F with next check. Warming blanket temperature decreased to 38 C but left on pt. Provider aware. Pt's lasix drip was stopped on day shift. Pt voided X 1 using the urinal. RN instructed pt to try to void in the bed using the urinal to conserve energy and reduce likelihood of pt desaturating from activity. Pt said he couldn't do that. RN assisted pt to EOB and pt voided in the urinal. O2 sats maintained > 90% during that time. Pt requesting water. He was given water with meds and intermittent sips to wet his mouth. Provider stated okay to change pt's diet order to NPO except sips with meds. Pt's BP and HR have been stable. He is V paced. FSBS have been in the 100-200's as opposed to 300's this evening. Provider contacted to clarify lantus order because dose increased on day shift but pt is NPO. Provider stated to give lantus as ordered. RN also requsted that provider change the SSI orders to be Q4H since pt now has Q4H FSBS ordered d/t being NPO. Pt is a high fall risk. He has a cane from home at the bedside but PT recommended that pt use a walker when ambulating. Pt has yet to get OOB tonight d/t respiratory status. Fall risk bracelet, socks, and sign in place. Bed in locked, low position with side rails up X 3 and an active bed alarm. Since pt has not gotten OOB during shift, RN requested that pt turn onto one of his sides. Pt stated, \"I can't sleep on my side. \" RN explained the risks of skin breakdown from immobility and pointed out that pt has not been as active today as he has been in the past. Pt noted to shift hips in bed but did not fully turn off of his back. Pt refused further assistance with turning. B elastic wraps removed at HS because pt had stated they had been on since yesterday. Lotion applied to pt's BLE.

## 2020-12-25 NOTE — PROGRESS NOTES
Attempted to put patient on HFNC @ 10 lpm.  Couldn't maintain SpO2 above 90% at 15 lpm.  Patient was only off BIPAP for approximately 5 minutes.   When returned to BIPAP, had to increase to IPAP 22, fiO2 1.0

## 2020-12-25 NOTE — PROGRESS NOTES
[facility]  Inpatient Progress Note    CC:        Shortness of breath         History of Present Illness:  Eusebio Fish is a 70 y.o. patient who presented to the hospital with complaints of shortness of breath and hypoxia. The patient states he had been feeling poorly for about the past week. He tested positive for COVID-19. He typically follows with Dr. Ayse Hughes. He reports compliance to his medications although he admits that he likely drinks more fluids than he should. Prior to his acute illness, he noted worsening lower extremity edema. He denied associated chest pains. His chest x-ray was concerning for COVID-19 pneumonia with multifocal airspace opacities. He does not have an oxygen requirement at baseline but is currently wearing 2 L of supplemental oxygen. Pulmonary is also been consulted.   The patient also reports subjective fevers and a cough.     Past Medical History:   has a past medical history of Acute anxiety, Acute on chronic systolic congestive heart failure ([address] Anemia, Arthritis, Atrial fibrillation ([address] Bronchiolitis obliterans organizing pneumonia ([address] CAD (coronary artery disease), CHF (congestive heart failure) ([address] Congestive heart failure, unspecified, Disease of blood and blood forming organ, Hyperlipidemia, Hypertension, Kidney stone, Neuropathy, Osteoarthritis, Pneumonia, Pure hypercholesterolemia, Seasonal allergies, Sleep apnea, Type II or unspecified type diabetes mellitus without mention of complication, not stated as uncontrolled, and Type II or unspecified type diabetes mellitus without mention of complication, uncontrolled.     Surgical History: has a past surgical history that includes Coronary angioplasty with stent (2000); Hemorrhoid surgery; Coronary angioplasty with stent (16.4633); Cardiac defibrillator placement; ablation of dysrhythmic focus (11/06/2017); pacemaker placement;  Intracapsular cataract extraction (Right, 1/14/2020); and Cataract removal.      Interval history      Intake/Output Summary (Last 24 hours) at 12/25/2020 1127  Last data filed at 12/25/2020 0951  Gross per 24 hour   Intake 662 ml   Output 1350 ml   Net -688 ml         Physical Examination:    /68   Pulse 93   Temp 97.6 °F (36.4 °C) (Axillary)   Resp 26   Ht 5' 8\" (1.727 m)   Wt 248 lb 7.3 oz (112.7 kg)   SpO2 91%   BMI 37.78 kg/m²     Current Facility-Administered Medications: insulin glargine (LANTUS) injection vial 10 Units, 10 Units, Subcutaneous, BID  doxycycline hyclate (VIBRA-TABS) tablet 100 mg, 100 mg, Oral, 2 times per day  dexamethasone (DECADRON) tablet 10 mg, 10 mg, Oral, 2 times per day  insulin lispro (HUMALOG) injection vial 0-12 Units, 0-12 Units, Subcutaneous, Q4H  guaiFENesin-dextromethorphan (ROBITUSSIN DM) 100-10 MG/5ML syrup 10 mL, 10 mL, Oral, Q4H PRN  0.9 % sodium chloride infusion, , Intravenous, PRN  Vitamin D (CHOLECALCIFEROL) tablet 6,000 Units, 6,000 Units, Oral, Daily  zinc sulfate (ZINCATE) capsule 50 mg, 50 mg, Oral, Daily  ALPRAZolam (XANAX) tablet 0.5 mg, 0.5 mg, Oral, BID PRN  apixaban (ELIQUIS) tablet 5 mg, 5 mg, Oral, BID  atorvastatin (LIPITOR) tablet 80 mg, 80 mg, Oral, Nightly  benzonatate (TESSALON) capsule 100 mg, 100 mg, Oral, TID PRN  gabapentin (NEURONTIN) capsule 100 mg, 100 mg, Oral, Nightly  metoprolol succinate (TOPROL XL) extended release tablet 100 mg, 100 mg, Oral, Daily  sacubitril-valsartan (ENTRESTO) 24-26 MG per tablet 1 tablet, 1 tablet, Oral, BID  albuterol sulfate  (90 Base) MCG/ACT inhaler 2 puff, 2 puff, Inhalation, Q6H PRN  spironolactone (ALDACTONE) tablet 25 mg, 25 mg, Oral, Daily glucose (GLUTOSE) 40 % oral gel 15 g, 15 g, Oral, PRN  dextrose 50 % IV solution, 12.5 g, Intravenous, PRN  glucagon (rDNA) injection 1 mg, 1 mg, Intramuscular, PRN  dextrose 5 % solution, 100 mL/hr, Intravenous, PRN  sodium chloride flush 0.9 % injection 10 mL, 10 mL, Intravenous, 2 times per day  sodium chloride flush 0.9 % injection 10 mL, 10 mL, Intravenous, PRN  promethazine (PHENERGAN) tablet 12.5 mg, 12.5 mg, Oral, Q6H PRN **OR** ondansetron (ZOFRAN) injection 4 mg, 4 mg, Intravenous, Q6H PRN  polyethylene glycol (GLYCOLAX) packet 17 g, 17 g, Oral, Daily PRN  acetaminophen (TYLENOL) tablet 650 mg, 650 mg, Oral, Q6H PRN **OR** acetaminophen (TYLENOL) suppository 650 mg, 650 mg, Rectal, Q6H PRN         Labs:   Recent Labs     20  0634 20  1016   WBC 7.7 9.3   HGB 14.4 14.5   HCT 43.7 44.7   PLT 89* 109*     Recent Labs     20  0735 20  1016   * 130*   K 4.1 3.9   CO2 25 28   BUN 49* 62*   CREATININE 1.8* 1.9*   LABGLOM 37* 35*     No results for input(s): BNP in the last 72 hours. No results for input(s): PROTIME, INR in the last 72 hours. No results for input(s): CKTOTAL, CKMB, CKMBINDEX, TROPONINI in the last 72 hours. EK2020. Electronic ventricular pacemaker.     Echo: 10/1/2019. Left ventricular cavity size is dilated. Normal left ventricular wall thickness. Ejection fraction is moderately reduced & visually estimated to be 30%. Mild mitral regurgitation. The left atrium is dilated. Trivial aortic regurgitation. Pacer / ICD wire is visualized in the right ventricle. Mild tricuspid regurgitation with RVSP of 36 mmHg.   The right atrium is dilated.     Tele: V paced.     Cath:  1. Successful percutaneous coronary intervention using a 2.75-mm Xience drug-eluting stent in an 80% mid left anterior descending artery lesion that was moderately calcified. This was dilated to 2.91 mm in diameter. Successful stenting with a 2.75 mm x 18 mm Xience drug-eluting stent in the ostial 80% left anterior descending artery lesion. Kissing balloon technique was utilized in the ostial circumflex artery and left anterior descending artery stents, resulting in 0% residual stenosis and DEN 3 flow. 2. In the dominant right coronary artery, successfully intervened upon with a 3 mm x 38 mm Xience drug-eluting stent dilated to 3.20 mm. There was DEN 3 flow in the vessel and 0% residual stenosis. 3. Otherwise, in the left system, there is an 80% hazy first diagonal branch lesion. There was 30% restenosis in the proximal circumflex artery stent and diffuse disease otherwise. There is a residual 70% lesion in the distal left anterior descending artery. 4. Moderately elevated left ventricular end-diastolic pressure of 25 mmHg. 5. Moderately severe left ventricular systolic dysfunction with global hypokinesis and left ventricular ejection fraction of 30%. ASSESSMENT AND PLAN:      1) COVID-19 pneumonia. Management per primary team and pulmonary.    2) Acute on chronic systolic heart failure s/p BiV ICD. EF 30%. BNP chronically elevated. Assume his acute symptoms are more related to his COVID-19 infection/pneumonia as opposed to decompensated heart failure. OnToprol-XL, Entresto, and Aldactone. Off lasix due to worsening Bun/Creat.    3) Ischemic cardiomyopathy/CAD. Continue with medical management and risk factor modification including aspirin, statin, and B-blocker.    4) Paroxysmal atrial fibrillation. Continue Eliquis and rate control strategy with beta-blocker.     5) Essential hypertension. Uncontrolled. Goal BP <130/80.  Continue medical therapy.    6) CKD stage IIIb. Continue to monitor creatinine with diuresis. Will sign off. Paul HODGE  12/25/2020

## 2020-12-25 NOTE — PLAN OF CARE
Problem: OXYGENATION/RESPIRATORY FUNCTION  Goal: Patient will maintain patent airway  12/25/2020 1042 by Luis Salazar RN  Outcome: Ongoing  12/25/2020 0319 by Maria T Velazquez RN  Outcome: Ongoing  12/25/2020 0140 by Maria T Velazquez RN  Outcome: Ongoing  Goal: Patient will achieve/maintain normal respiratory rate/effort  Description: Respiratory rate and effort will be within normal limits for the patient  12/25/2020 1042 by Luis Salazar RN  Outcome: Ongoing  12/25/2020 0319 by Maria T Velazquez RN  Outcome: Ongoing  12/25/2020 0140 by Maria T Velazquez RN  Outcome: Ongoing     Problem: HEMODYNAMIC STATUS  Goal: Patient has stable vital signs and fluid balance  12/25/2020 1042 by Luis Salazar RN  Outcome: Ongoing  12/25/2020 0319 by Maria T Velazquez RN  Outcome: Ongoing  12/25/2020 0140 by Maria T Velazquez RN  Outcome: Ongoing     Problem: FLUID AND ELECTROLYTE IMBALANCE  Goal: Fluid and electrolyte balance are achieved/maintained  12/25/2020 1042 by Luis Salazar RN  Outcome: Ongoing  12/25/2020 0319 by Maria T Velazquez RN  Outcome: Ongoing  12/25/2020 0140 by Maria T Velazquez RN  Outcome: Ongoing     Problem: ACTIVITY INTOLERANCE/IMPAIRED MOBILITY  Goal: Mobility/activity is maintained at optimum level for patient  12/25/2020 1042 by Luis Salazar RN  Outcome: Ongoing  12/25/2020 0319 by Maria T Velazquez RN  Outcome: Ongoing  12/25/2020 0140 by Maria T Velazquez RN  Outcome: Ongoing     Problem: Falls - Risk of:  Goal: Will remain free from falls  Description: Will remain free from falls  12/25/2020 1042 by Luis Salazar RN  Outcome: Ongoing  12/25/2020 0319 by Maria T Velazquez RN  Outcome: Ongoing  12/25/2020 0140 by Maria T Velazquez RN  Outcome: Ongoing  Goal: Absence of physical injury  Description: Absence of physical injury  12/25/2020 1042 by Luis Salazar RN  Outcome: Ongoing  12/25/2020 0319 by Maria T Velazquez RN  Outcome: Ongoing  12/25/2020 0140 by Maria T Velazquez RN  Outcome: Ongoing     Problem: Skin Integrity: Goal: Will show no infection signs and symptoms  Description: Will show no infection signs and symptoms  12/25/2020 1042 by Elsa Watters RN  Outcome: Ongoing  12/25/2020 0319 by Byron Khan RN  Outcome: Ongoing  12/25/2020 0140 by Byron Khan RN  Outcome: Ongoing  Goal: Absence of new skin breakdown  Description: Absence of new skin breakdown  12/25/2020 1042 by Elsa Watters RN  Outcome: Ongoing  12/25/2020 0319 by Byron Khan RN  Outcome: Ongoing  12/25/2020 0140 by Byron Khan RN  Outcome: Ongoing     Problem: Breathing Pattern - Ineffective:  Goal: Ability to achieve and maintain a regular respiratory rate will improve  Description: Ability to achieve and maintain a regular respiratory rate will improve  Outcome: Ongoing     Problem:  Activity:  Goal: Ability to tolerate increased activity will improve  Description: Ability to tolerate increased activity will improve  Outcome: Ongoing

## 2020-12-25 NOTE — PROGRESS NOTES
Pulmonary Progress Note    CC:  Follow up hypoxia, COVID    Subjective:  No on NIV at 100% FIO2  Afebrile   Desaturated yesterday and was put on bilevel  Does not necessarily like the mask but is doing ok  Not too SOB at this time    ROS  Some SOB  Does not like mask      Intake/Output Summary (Last 24 hours) at 12/25/2020 1259  Last data filed at 12/25/2020 0951  Gross per 24 hour   Intake 610 ml   Output 1350 ml   Net -740 ml         PHYSICAL EXAM:  Blood pressure 117/72, pulse 91, temperature 98.1 °F (36.7 °C), temperature source Axillary, resp. rate 30, height 5' 8\" (1.727 m), weight 248 lb 7.3 oz (112.7 kg), SpO2 94 %.'  Gen: Pale, chronically ill  Eyes: PERRL. No sclera icterus. No conjunctival injection. ENT: No discharge. Pharynx with bilevel mask  Neck: Trachea midline. No obvious mass. Resp: Diminished   CV: Distant   GI: Non-tender. Non-distended. No hernia. Skin: Venous stasis   Lymph: No cervical LAD. No supraclavicular LAD. M/S: No cyanosis. No clubbing. No joint deformity. Neuro: Moves all four extremities. CN 2-12 tested, no defect noted.   Ext:   + edema    Medications:    Scheduled Meds:   insulin glargine  10 Units Subcutaneous BID    doxycycline hyclate  100 mg Oral 2 times per day    dexamethasone  10 mg Oral 2 times per day    insulin lispro  0-12 Units Subcutaneous Q4H    Vitamin D  6,000 Units Oral Daily    zinc sulfate  50 mg Oral Daily    apixaban  5 mg Oral BID    atorvastatin  80 mg Oral Nightly    gabapentin  100 mg Oral Nightly    metoprolol succinate  100 mg Oral Daily    sacubitril-valsartan  1 tablet Oral BID    spironolactone  25 mg Oral Daily    sodium chloride flush  10 mL Intravenous 2 times per day       Continuous Infusions:   sodium chloride      dextrose         PRN Meds: guaiFENesin-dextromethorphan, sodium chloride, ALPRAZolam, benzonatate, albuterol sulfate HFA, glucose, dextrose, glucagon (rDNA), dextrose, sodium chloride flush, promethazine **OR** ondansetron, polyethylene glycol, acetaminophen **OR** acetaminophen    Labs:  CBC:   Recent Labs     20  0634 20  1016   WBC 7.7 9.3   HGB 14.4 14.5   HCT 43.7 44.7   MCV 90.3 88.9   PLT 89* 109*     BMP:   Recent Labs     20  0634 20  0735 20  1016   * 132* 130*   K 3.9 4.1 3.9   CL 93* 94* 91*   CO2 27 25 28   PHOS  --  3.5 3.1   BUN 38* 49* 62*   CREATININE 1.6* 1.8* 1.9*     LIVER PROFILE: No results for input(s): AST, ALT, LIPASE, BILIDIR, BILITOT, ALKPHOS in the last 72 hours. Invalid input(s): AMYLASE,  ALB  PT/INR: No results for input(s): PROTIME, INR in the last 72 hours. APTT: No results for input(s): APTT in the last 72 hours. UA:No results for input(s): NITRITE, COLORU, PHUR, LABCAST, WBCUA, RBCUA, MUCUS, TRICHOMONAS, YEAST, BACTERIA, CLARITYU, SPECGRAV, LEUKOCYTESUR, UROBILINOGEN, BILIRUBINUR, BLOODU, GLUCOSEU, AMORPHOUS in the last 72 hours. Invalid input(s): Annika Iron  No results for input(s): PH, PCO2, PO2 in the last 72 hours. Films:  Chest imaging reports were reviewed and imaging was reviewed by me and showed bilateral airspace disease with AICD    AB.    Cultures:  Negative    I reviewed the labs and images listed above    Assessment:   · Acute Hypoxic Respiratory Failure with saturations less than 90% on room air  · COVID-19 Pneumonia  · Suspect ARDS  · Cardiomyopathy       Plan:  Titrate oxygen for saturations greater than or equal to 88%  · Trial off of bilevel and try vapotherm.   Can cycle between the two as needed to keep oxygen saturations at 88%  · Completed decadron course  · Eliquis for DVT prophylaxis  · Increase activity as tolerated     D/W Dr. Gregor Ahn,   [facility]

## 2020-12-25 NOTE — PROGRESS NOTES
RN called Hayden Brizuela back twice and he attempted to call RN once but each time call was answered, the connection was bad and RN could not hear Hayden Brizuela.

## 2020-12-25 NOTE — PROGRESS NOTES
Neck: Supple, with full range of motion. JVD was unable to assess due to body habitus  Trachea midline. Respiratory:  On BiPAP, better air entry today, crackles present at the bases  Limited exam due to body habitus and disposable stethoscope used for exam  Cardiovascular:  Regular rate and rhythm with normal S1/S2 without murmurs, rubs or gallops. Abdomen: Soft, non-tender, non-distended with normal bowel sounds. Musculoskeletal:  No clubbing, cyanosis, 3+ bilateral lower extremity edema, chronic venous stasis changes full range of motion without deformity. Skin: Bilateral lower extremity 1+ edema present  Neurologic:   Following commands, no focal neurological deficits, no motor or sensory deficits  Psychiatric:   Alert and oriented to person place and time  Capillary Refill: Less than 5 seconds  Peripheral Pulses: +2 palpable, equal bilaterally           Labs:   Recent Labs     12/23/20  0634 12/25/20  1016   WBC 7.7 9.3   HGB 14.4 14.5   HCT 43.7 44.7   PLT 89* 109*     Recent Labs     12/23/20  0634 12/24/20  0735 12/25/20  1016   * 132* 130*   K 3.9 4.1 3.9   CL 93* 94* 91*   CO2 27 25 28   BUN 38* 49* 62*   CREATININE 1.6* 1.8* 1.9*   CALCIUM 8.8 8.8 9.0   PHOS  --  3.5 3.1     No results for input(s): AST, ALT, BILIDIR, BILITOT, ALKPHOS in the last 72 hours. No results for input(s): INR in the last 72 hours. No results for input(s): Kendall Ricmhond in the last 72 hours.     Urinalysis:      Lab Results   Component Value Date    NITRU Negative 02/06/2014    WBCUA 0-2 02/06/2014    RBCUA 0-2 02/06/2014    BLOODU TRACE-INTACT 02/06/2014    SPECGRAV 1.015 02/06/2014    GLUCOSEU Negative 02/06/2014       Radiology:  XR CHEST PORTABLE   Final Result   Bilateral airspace disease, increased compared to prior, with increased   left-sided pleural effusion         XR CHEST PORTABLE   Final Result   Multifocal airspace opacities which may represent pulmonary edema, atelectasis and/or pneumonia. Findings nonspecific but can be seen with   atypical and viral pneumonia. Please correlate with patient history,   particularly of COVID-19 exposure.                  Assessment/Plan:    Active Hospital Problems    Diagnosis Date Noted    ARDS (adult respiratory distress syndrome) ([facility] [place] [address] [J80]     Pneumonia due to COVID-19 virus [U07.1, J12.89]     Paroxysmal atrial fibrillation (HCC) [I48.0]     Acute respiratory failure with hypoxemia ([facility] [place] [address] [J96.01] 12/22/2020    Acute on chronic systolic heart failure (HCC) [I50.23] 01/17/2018    Biventricular ICD (implantable cardioverter-defibrillator) in place [Z95.810] 09/30/2015     Pneumonia likely COVID-19 but cannot rule out underlying bacterial pneumonia  Acute respiratory failure with hypoxemia, increased work of breathing fatigue tiredness tachypnea on admission  Did not get sepsis protocol fluids as recommended to keep patients on the drier side with COVID19 and with underlying systolic heart failure  Trend inflammatory markers, trending up  Avoid nebulizer treatments, use metered-dose inhalers  Vitamin D 6000 units daily, Zn sulfate 50 mg OD  Decadron, Doxycycline for atypical pneumonia coverage  Prone as much as possible  S/p Convalescent plasma rx  Monitor O2 sats, place O2 to maintain for SpO2 > 90%  On BiPAP, settings reducing still satting 93%, will try to place Vapotherm  Discussed with Dr. James Vega pulmonary    Acute on chronic systolic congestive heart failure  Continue apixaban 5 mg twice daily for anticoagulation  Continue Lipitor 80 mg nightly  Hold Entresto/spironolactone with borderline hypotension  Reduce Toprol-XL dose to 50 mg once daily  Holding diuretics  Strict input and output  Daily standing weights    History of severe CAD in RCA and LAD  Elevated troponin, likely secondary to cardiac stress from pneumonia  Ischemic cardiomyopathy  ICD in place  Continue ASA, continue toprol XL Hyperglycemia due to Type 2 DM and decadron use  - BG trend reviewed, patient has not had adequate p.o. intake, insulin doses were adjusted     DVT Prophylaxis: apixaban  Diet: Diet NPO Effective Now Exceptions are: Sips with Meds  Code Status: Full Code     PT/OT Eval Status: order once acute issues resolved    Dispo - continue inpatient care    Rosie Cervantes MD   Hospitalist

## 2020-12-25 NOTE — PROGRESS NOTES
Patient assisted to use urinal at this time. O2 Sat's 89% on cont bipap 100 % FiO2. Declines proning. RT called to bedside.  Electronically signed by Gillian Argueta RN on 12/25/2020 at 7:28 AM

## 2020-12-26 NOTE — PLAN OF CARE
Problem: OXYGENATION/RESPIRATORY FUNCTION  Goal: Patient will maintain patent airway  12/26/2020 0018 by Nita Tolbert RN  Outcome: Met This Shift  12/25/2020 1042 by Luis Salazar RN  Outcome: Ongoing  Goal: Patient will achieve/maintain normal respiratory rate/effort  Description: Respiratory rate and effort will be within normal limits for the patient  12/26/2020 0018 by Nita Tolbert RN  Outcome: Ongoing  12/25/2020 1042 by Luis Salazar RN  Outcome: Ongoing   Alert and oriented x4 Dyspnic with min. Exertion Sats. WNL on ministerio. CPAP No c/o pain Cont. Per urinal. Free from fall/injury.  Frequently turns and repositions self

## 2020-12-26 NOTE — PROGRESS NOTES
Hospitalist Progress Note      PCP: Gavino Kerns MD    Date of Admission: 12/22/2020    Chief Complaint: shortness of breath    Subjective:   Took bipap off this am and was found to be 85%  Needing Continous bipap to main O2 sats  Says he doesn't feel that short of breath but he is not improving    Medications:  Reviewed    Infusion Medications    sodium chloride      dextrose       Scheduled Medications    insulin glargine  10 Units Subcutaneous BID    metoprolol succinate  50 mg Oral Daily    dexamethasone  10 mg Oral 2 times per day    doxycycline hyclate  100 mg Oral 2 times per day    insulin lispro  0-12 Units Subcutaneous Q4H    Vitamin D  6,000 Units Oral Daily    zinc sulfate  50 mg Oral Daily    apixaban  5 mg Oral BID    atorvastatin  80 mg Oral Nightly    gabapentin  100 mg Oral Nightly    [Held by provider] sacubitril-valsartan  1 tablet Oral BID    spironolactone  25 mg Oral Daily    sodium chloride flush  10 mL Intravenous 2 times per day     PRN Meds: LORazepam, guaiFENesin-dextromethorphan, sodium chloride, ALPRAZolam, benzonatate, albuterol sulfate HFA, glucose, dextrose, glucagon (rDNA), dextrose, sodium chloride flush, promethazine **OR** ondansetron, polyethylene glycol, acetaminophen **OR** acetaminophen      Intake/Output Summary (Last 24 hours) at 12/26/2020 1215  Last data filed at 12/25/2020 1942  Gross per 24 hour   Intake 120 ml   Output 1000 ml   Net -880 ml       Physical Exam Performed:    /73   Pulse 89   Temp 96.9 °F (36.1 °C) (Axillary)   Resp 21   Ht 5' 8\" (1.727 m)   Wt 250 lb 14.1 oz (113.8 kg)   SpO2 (!) 84%   BMI 38.15 kg/m²     General appearance: Morbidly obese male, ill-appearing, breathing much better today  HEENT:  Normal cephalic, atraumatic without obvious deformity. Pupils equal, round, and reactive to light. Extra ocular muscles intact. Conjunctivae/corneas clear. Neck: Supple, with full range of motion. JVD was unable to assess due to body habitus  Trachea midline. Respiratory:  On BiPAP, better air entry today, crackles present at the bases  Limited exam due to body habitus and disposable stethoscope used for exam  Cardiovascular:  Regular rate and rhythm with normal S1/S2 without murmurs, rubs or gallops. Abdomen: Soft, non-tender, non-distended with normal bowel sounds. Musculoskeletal:  No clubbing, cyanosis, 3+ bilateral lower extremity edema, chronic venous stasis changes full range of motion without deformity. Skin: Bilateral lower extremity 1+ edema present  Neurologic:   Following commands, no focal neurological deficits, no motor or sensory deficits  Psychiatric:   Alert and oriented to person place and time  Capillary Refill: Less than 5 seconds  Peripheral Pulses: +2 palpable, equal bilaterally           Labs:   Recent Labs     12/25/20  1016   WBC 9.3   HGB 14.5   HCT 44.7   *     Recent Labs     12/24/20  0735 12/25/20  1016 12/26/20  0627   * 130* 130*   K 4.1 3.9 3.8   CL 94* 91* 93*   CO2 25 28 26   BUN 49* 62* 70*   CREATININE 1.8* 1.9* 1.8*   CALCIUM 8.8 9.0 9.0   PHOS 3.5 3.1 2.8     No results for input(s): AST, ALT, BILIDIR, BILITOT, ALKPHOS in the last 72 hours. No results for input(s): INR in the last 72 hours. No results for input(s): Maryl Peek in the last 72 hours.     Urinalysis:      Lab Results   Component Value Date    NITRU Negative 02/06/2014    WBCUA 0-2 02/06/2014    RBCUA 0-2 02/06/2014    BLOODU TRACE-INTACT 02/06/2014    SPECGRAV 1.015 02/06/2014    GLUCOSEU Negative 02/06/2014       Radiology:  XR CHEST PORTABLE   Final Result   Bilateral airspace disease, increased compared to prior, with increased   left-sided pleural effusion         XR CHEST PORTABLE   Final Result   Multifocal airspace opacities which may represent pulmonary edema, atelectasis and/or pneumonia. Findings nonspecific but can be seen with   atypical and viral pneumonia. Please correlate with patient history,   particularly of COVID-19 exposure. Assessment/Plan:    Active Hospital Problems    Diagnosis Date Noted    ARDS (adult respiratory distress syndrome) ([facility] [address] [J80]     Pneumonia due to COVID-19 virus [U07.1, J12.89]     Paroxysmal atrial fibrillation (HCC) [I48.0]     Acute respiratory failure with hypoxemia ([facility] [address] [J96.01] 12/22/2020    Acute on chronic systolic heart failure (HCC) [I50.23] 01/17/2018    Biventricular ICD (implantable cardioverter-defibrillator) in place [Z95.810] 09/30/2015     Pneumonia likely COVID-19 but cannot rule out underlying bacterial pneumonia  Acute respiratory failure with hypoxemia, increased work of breathing fatigue tiredness tachypnea on admission  Did not get sepsis protocol fluids as recommended to keep patients on the drier side with COVID19 and with underlying systolic heart failure  Trend inflammatory markers, trending up  Avoid nebulizer treatments, use metered-dose inhalers  Vitamin D 6000 units daily, Zn sulfate 50 mg OD  Decadron, Doxycycline for atypical pneumonia coverage  Prone as much as possible  S/p Convalescent plasma rx  Monitor O2 sats, place O2 to maintain for SpO2 > 90%  On BiPAP, needing continous bipap, doesn't want intubation  Agree that he is high risk and would not do well with intubation  Inflammatory markers are trending up, Ferrtin 7000s!     Acute on chronic systolic congestive heart failure POA  Continue apixaban 5 mg twice daily for anticoagulation  Continue Lipitor 80 mg nightly  Hold Entresto/spironolactone with borderline hypotension  Reduce Toprol-XL dose to 50 mg once daily  Holding diuretics  Strict input and output  Daily standing weights    History of severe CAD in RCA and LAD  Elevated troponin, likely secondary to cardiac stress from pneumonia  Ischemic cardiomyopathy ICD in place  Continue ASA, continue toprol XL    Hyperglycemia due to Type 2 DM and decadron use  - BG trend reviewed, patient has not had adequate p.o. intake, insulin doses were adjusted     DVT Prophylaxis: apixaban  Diet: Diet NPO Effective Now Exceptions are: Sips with Meds  Code Status: Limited     PT/OT Eval Status: order once acute issues resolved    Dispo - continue inpatient care, critically ill    Roxie Chavez MD   Hospitalist

## 2020-12-26 NOTE — PROGRESS NOTES
Aprox. 0600 I heard BIPAP going off I went into room and asked patient why he keeps taking it off cause this was about the 3rd or 4th time. He stated he did not take it off. I looked at patient and his eyes were glossey and around lips blue Sats. Were at 58% BIPAP placed back on and sugar checked and was 160. After a couple min. patirnts Sats.  Were up to 92%

## 2020-12-26 NOTE — PROGRESS NOTES
Pulmonary Progress Note    CC:  Follow up hypoxia, COVID    Subjective:  Still fairly bilevel dependent  Took of mask and desaturated  He is 80% on bilevel at this time but does not feel SOB    ROS  Denies SOB      Intake/Output Summary (Last 24 hours) at 12/26/2020 1132  Last data filed at 12/25/2020 1942  Gross per 24 hour   Intake 120 ml   Output 1000 ml   Net -880 ml         PHYSICAL EXAM:  Blood pressure 110/73, pulse 89, temperature 96.9 °F (36.1 °C), temperature source Axillary, resp. rate 21, height 5' 8\" (1.727 m), weight 250 lb 14.1 oz (113.8 kg), SpO2 97 %.'  Gen: Pale, chronically ill and on bilevel   Eyes: PERRL. No sclera icterus. No conjunctival injection. ENT: No discharge. Pharynx with bilevel mask  Neck: Trachea midline. No obvious mass. Resp: Diminished with some accessory muscle use   CV: Distant   GI: Non-tender. Non-distended. No hernia. Skin: Venous stasis   Lymph: No cervical LAD. No supraclavicular LAD. M/S: No cyanosis. No clubbing. No joint deformity. Neuro: Moves all four extremities. CN 2-12 tested, no defect noted.   Ext:   + edema    Medications:    Scheduled Meds:   insulin glargine  10 Units Subcutaneous BID    metoprolol succinate  50 mg Oral Daily    dexamethasone  10 mg Oral 2 times per day    doxycycline hyclate  100 mg Oral 2 times per day    insulin lispro  0-12 Units Subcutaneous Q4H    Vitamin D  6,000 Units Oral Daily    zinc sulfate  50 mg Oral Daily    apixaban  5 mg Oral BID    atorvastatin  80 mg Oral Nightly    gabapentin  100 mg Oral Nightly    [Held by provider] sacubitril-valsartan  1 tablet Oral BID    spironolactone  25 mg Oral Daily    sodium chloride flush  10 mL Intravenous 2 times per day       Continuous Infusions:   sodium chloride      dextrose         PRN Meds: LORazepam, guaiFENesin-dextromethorphan, sodium chloride, ALPRAZolam, benzonatate, albuterol sulfate HFA, glucose, dextrose, glucagon (rDNA), dextrose, sodium chloride flush, promethazine **OR** ondansetron, polyethylene glycol, acetaminophen **OR** acetaminophen    Labs:  CBC:   Recent Labs     20  1016   WBC 9.3   HGB 14.5   HCT 44.7   MCV 88.9   *     BMP:   Recent Labs     20  0735 20  1016 20  0627   * 130* 130*   K 4.1 3.9 3.8   CL 94* 91* 93*   CO2  28 26   PHOS 3.5 3.1 2.8   BUN 49* 62* 70*   CREATININE 1.8* 1.9* 1.8*     LIVER PROFILE: No results for input(s): AST, ALT, LIPASE, BILIDIR, BILITOT, ALKPHOS in the last 72 hours. Invalid input(s): AMYLASE,  ALB  PT/INR: No results for input(s): PROTIME, INR in the last 72 hours. APTT: No results for input(s): APTT in the last 72 hours. UA:No results for input(s): NITRITE, COLORU, PHUR, LABCAST, WBCUA, RBCUA, MUCUS, TRICHOMONAS, YEAST, BACTERIA, CLARITYU, SPECGRAV, LEUKOCYTESUR, UROBILINOGEN, BILIRUBINUR, BLOODU, GLUCOSEU, AMORPHOUS in the last 72 hours. Invalid input(s): Jose Turner  No results for input(s): PH, PCO2, PO2 in the last 72 hours. Films:  Chest imaging reports were reviewed and imaging was reviewed by me and showed bilateral airspace disease with AICD    AB.33/60/85    Cultures:  Negative    I reviewed the labs and images listed above    Assessment:   · Acute Hypoxic Respiratory Failure with saturations less than 90% on room air  · COVID-19 Pneumonia  · Suspect ARDS  · Cardiomyopathy       Plan:  Titrate oxygen for saturations greater than or equal to 88%  · I changed his settings to AVAPS mode to see if this will improve him any more. He went from 80% up to 88% while I was in the room.   Of course his dependence on this mask for the past 24 hours is a poor sign  · High risk for intubation but said he does not want to be intubated if he can avoid it  · Decadron q 12 hours · Eliquis for DVT prophylaxis  · Intermittent lasix for volume control     He told me he does not want to be on the vent. He was lucid when he said that.   I would not intubate him despite what family says as he is the decision maker here       Laura Clements, DO [facility]

## 2020-12-27 NOTE — PLAN OF CARE
Problem: OXYGENATION/RESPIRATORY FUNCTION  Goal: Patient will maintain patent airway  Outcome: Met This Shift  Goal: Patient will achieve/maintain normal respiratory rate/effort  Description: Respiratory rate and effort will be within normal limits for the patient  Outcome: Ongoing   Alert and oriented x4 Dyspnic with min. Exertion On ministerio. BIPAP Lungs diminished. Cough and deep breathing exercises encouraged. No c/o pain Cont.  Per urinal Free from fall/injury Frequently turns and repositions self

## 2020-12-27 NOTE — PLAN OF CARE
Problem: OXYGENATION/RESPIRATORY FUNCTION  Goal: Patient will maintain patent airway  12/27/2020 0841 by Malik Duckworth RN  Outcome: Ongoing     Problem: OXYGENATION/RESPIRATORY FUNCTION  Goal: Patient will achieve/maintain normal respiratory rate/effort  Description: Respiratory rate and effort will be within normal limits for the patient  12/27/2020 0841 by Malik Duckworth RN  Outcome: Ongoing     Problem: HEMODYNAMIC STATUS  Goal: Patient has stable vital signs and fluid balance  12/27/2020 0841 by Malik Duckworth RN  Outcome: Ongoing     Problem: FLUID AND ELECTROLYTE IMBALANCE  Goal: Fluid and electrolyte balance are achieved/maintained  12/27/2020 0841 by Malik Duckworth RN  Outcome: Ongoing     Problem: ACTIVITY INTOLERANCE/IMPAIRED MOBILITY  Goal: Mobility/activity is maintained at optimum level for patient  12/27/2020 0841 by Malik Duckworth RN  Outcome: Ongoing  Maintaining low sodium diet, strict intake and output, daily weights and fluid restriction. Providing at least 15 minutes of CHF education per shift. Assessing vitals q4 hours. Assessing heart and lung sounds. Monitoring labs q shift and as resulted. Maintaining oxygenation. Administering medications as ordered. Reporting abnormal findings to MD.     Problem: Falls - Risk of:  Goal: Will remain free from falls  Description: Will remain free from falls  12/27/2020 0841 by Malik Duckworth RN  Outcome: Ongoing     Problem: Falls - Risk of:  Goal: Absence of physical injury  Description: Absence of physical injury  12/27/2020 0841 by Malik Duckworth RN  Outcome: Ongoing  Bed alarm on, bed in lowest position, wheels locked. Fall risk assessment completed every shift. Nonskid socks on, fall sign posted. Pt educated on use of call light. No falls on this shift.      Problem: Skin Integrity:  Goal: Will show no infection signs and symptoms  Description: Will show no infection signs and symptoms  12/27/2020 0841 by Malik Duckworth RN  Outcome: Ongoing Problem: Skin Integrity:  Goal: Absence of new skin breakdown  Description: Absence of new skin breakdown  12/27/2020 0841 by Suzi Juinor RN  Outcome: Ongoing  Assessing cydney scale Q shift. Performing skin assessments every shift. Maintaining dry and clean skin. Promoting adequate nutritional intake. Heels elevated off bed. Performing skin care q shift. Utilizing lift equipment when indicated. Problem: Breathing Pattern - Ineffective:  Goal: Ability to achieve and maintain a regular respiratory rate will improve  Description: Ability to achieve and maintain a regular respiratory rate will improve  12/27/2020 0841 by Suzi Junior RN  Outcome: Ongoing     Problem:  Activity:  Goal: Ability to tolerate increased activity will improve  Description: Ability to tolerate increased activity will improve  12/27/2020 0841 by Suzi Junior RN  Outcome: Ongoing

## 2020-12-27 NOTE — PROGRESS NOTES
Pulmonary Progress Note    CC:  Follow up hypoxia, COVID    Subjective:  Still dependent on bilevel  Sitting in chair  Says he is doing ok. Does want a break from the mask  Grand-daughter is present    ROS  Denies SOB  Wants a break from the mask       Intake/Output Summary (Last 24 hours) at 12/27/2020 0951  Last data filed at 12/27/2020 0709  Gross per 24 hour   Intake 389.56 ml   Output 800 ml   Net -410.44 ml         PHYSICAL EXAM:  Blood pressure (!) 115/58, pulse 97, temperature 97.5 °F (36.4 °C), temperature source Axillary, resp. rate 24, height 5' 8\" (1.727 m), weight 250 lb 7.1 oz (113.6 kg), SpO2 92 %.'  Gen: Pale, chronically ill and on bilevel   Eyes: PERRL. No sclera icterus. No conjunctival injection. ENT: No discharge. Pharynx with bilevel mask  Neck: Trachea midline. No obvious mass. Resp: Diminished with some accessory muscle use   CV: Distant   GI: Non-tender. Non-distended. No hernia. Skin: Venous stasis   Lymph: No cervical LAD. No supraclavicular LAD. M/S: No cyanosis. No clubbing. No joint deformity. Neuro: Moves all four extremities. CN 2-12 tested, no defect noted.   Ext:   + edema    Medications:    Scheduled Meds:   dexamethasone  10 mg Intravenous Q12H    doxycycline (VIBRAMYCIN) IV  100 mg Intravenous Q12H    insulin glargine  10 Units Subcutaneous BID    metoprolol succinate  50 mg Oral Daily    insulin lispro  0-12 Units Subcutaneous Q4H    Vitamin D  6,000 Units Oral Daily    zinc sulfate  50 mg Oral Daily    apixaban  5 mg Oral BID    atorvastatin  80 mg Oral Nightly    gabapentin  100 mg Oral Nightly    [Held by provider] sacubitril-valsartan  1 tablet Oral BID    spironolactone  25 mg Oral Daily    sodium chloride flush  10 mL Intravenous 2 times per day       Continuous Infusions:   sodium chloride      dextrose         PRN Meds: LORazepam, guaiFENesin-dextromethorphan, sodium chloride, ALPRAZolam, benzonatate, albuterol sulfate HFA, glucose, dextrose, glucagon (rDNA), dextrose, sodium chloride flush, promethazine **OR** ondansetron, polyethylene glycol, acetaminophen **OR** acetaminophen    Labs:  CBC:   Recent Labs     20  1016   WBC 9.3   HGB 14.5   HCT 44.7   MCV 88.9   *     BMP:   Recent Labs     20  1016 20  0627 20  0633   * 130* 134*   K 3.9 3.8 4.0   CL 91* 93* 97*   CO2 28  26   PHOS 3.1 2.8 3.2   BUN 62* 70* 75*   CREATININE 1.9* 1.8* 1.5*     LIVER PROFILE: No results for input(s): AST, ALT, LIPASE, BILIDIR, BILITOT, ALKPHOS in the last 72 hours. Invalid input(s): AMYLASE,  ALB  PT/INR: No results for input(s): PROTIME, INR in the last 72 hours. APTT: No results for input(s): APTT in the last 72 hours. UA:No results for input(s): NITRITE, COLORU, PHUR, LABCAST, WBCUA, RBCUA, MUCUS, TRICHOMONAS, YEAST, BACTERIA, CLARITYU, SPECGRAV, LEUKOCYTESUR, UROBILINOGEN, BILIRUBINUR, BLOODU, GLUCOSEU, AMORPHOUS in the last 72 hours. Invalid input(s): Wendy Peabody  No results for input(s): PH, PCO2, PO2 in the last 72 hours. Films:  Chest imaging reports were reviewed and imaging was reviewed by me and showed bilateral airspace disease with AICD    AB.33/60/85    Cultures:  Negative    I reviewed the labs and images listed above    Assessment:   · Acute Hypoxic Respiratory Failure with saturations less than 90% on room air  · COVID-19 Pneumonia  · Suspect ARDS  · Cardiomyopathy       Plan:  Titrate oxygen for saturations greater than or equal to 88%. I would trial off of bilevel and not titrate oxygen based on saturations. Let him spend time with grand-daughter and family.   Only place on bilevel if distressed but if he gets distressed I would make him comfortable   · Decadron q 12 hours   · Eliquis for DVT prophylaxis  · Intermittent lasix for volume control   · Limited · Allow visitation     I spoke with grand-daughter. I explained that he is not a candidate for intubation as he does not want it and he will not do well. I explained he would likely suffer more organ shut down with the vent. She understood and was relieved I told her he is not a candidate for the vent. It seems COVID when through their entire household and she lost a another family member to Carol who was on the vent for 6 weeks.       Hospice once he is distressed       Venita Huggins, DO  [facility]

## 2020-12-27 NOTE — PROGRESS NOTES
This RN notified Dr. Natasha David that the patient has already received one unit of convalescent plasma on 12/23. And due to the national shortage of plasma, patients are only to receive one unit for now. This RN also notified the patients granddaughter of the update regarding plasma.     Electronically signed by Roberto Saldivar RN on 12/27/2020 at 5:12 PM

## 2020-12-27 NOTE — PLAN OF CARE
Problem: OXYGENATION/RESPIRATORY FUNCTION  Goal: Patient will maintain patent airway  12/27/2020 0226 by Priti Carroll RN  Outcome: Ongoing  12/27/2020 0202 by Priti Carroll RN  Outcome: Met This Shift  Goal: Patient will achieve/maintain normal respiratory rate/effort  Description: Respiratory rate and effort will be within normal limits for the patient  12/27/2020 0226 by Priti Carroll RN  Outcome: Not Met This Shift  12/27/2020 0202 by Priti Carroll RN  Outcome: Ongoing   Patient had decline in health and daughter was called and patient was changed to [address] [place] [address] She is currently on 11L O2 with sats.  In 60-70's Keeping her comfortable with Morphine and Ativan as rx'd Free from fall/injury

## 2020-12-27 NOTE — PROGRESS NOTES
Hospitalist Progress Note      PCP: Gavino Kerns MD    Date of Admission: 12/22/2020    Chief Complaint: shortness of breath    Subjective:   Unable to get patient off bipap as he desats  He is alert and oriented but says he is getting tired  Says no sob when on bipap  Discussed care with grand-daughter at bedside    Medications:  Reviewed    Infusion Medications    sodium chloride      dextrose       Scheduled Medications    dexamethasone  10 mg Intravenous Q12H    doxycycline (VIBRAMYCIN) IV  100 mg Intravenous Q12H    insulin glargine  10 Units Subcutaneous BID    metoprolol succinate  50 mg Oral Daily    insulin lispro  0-12 Units Subcutaneous Q4H    Vitamin D  6,000 Units Oral Daily    zinc sulfate  50 mg Oral Daily    apixaban  5 mg Oral BID    atorvastatin  80 mg Oral Nightly    gabapentin  100 mg Oral Nightly    [Held by provider] sacubitril-valsartan  1 tablet Oral BID    spironolactone  25 mg Oral Daily    sodium chloride flush  10 mL Intravenous 2 times per day     PRN Meds: LORazepam, guaiFENesin-dextromethorphan, sodium chloride, ALPRAZolam, benzonatate, albuterol sulfate HFA, glucose, dextrose, glucagon (rDNA), dextrose, sodium chloride flush, promethazine **OR** ondansetron, polyethylene glycol, acetaminophen **OR** acetaminophen      Intake/Output Summary (Last 24 hours) at 12/27/2020 1140  Last data filed at 12/27/2020 0709  Gross per 24 hour   Intake 389.56 ml   Output 800 ml   Net -410.44 ml       Physical Exam Performed:    BP (!) 115/58   Pulse 97   Temp 97.5 °F (36.4 °C) (Axillary)   Resp 24   Ht 5' 8\" (1.727 m)   Wt 250 lb 7.1 oz (113.6 kg)   SpO2 92%   BMI 38.08 kg/m²     General appearance: Morbidly obese male, ill-appearing, looks tired  HEENT:  Normal cephalic, atraumatic without obvious deformity. Pupils equal, round, and reactive to light. Extra ocular muscles intact. Conjunctivae/corneas clear. Neck: Supple, with full range of motion. JVD was unable to assess due to body habitus  Trachea midline. Respiratory:  On BiPAP, bilateral crackles present at the bases  Limited exam due to body habitus and disposable stethoscope used for exam  Cardiovascular:  Regular rate and rhythm with normal S1/S2 without murmurs, rubs or gallops. Abdomen: Soft, non-tender, non-distended with normal bowel sounds. Musculoskeletal:  No clubbing, cyanosis, 3+ bilateral lower extremity edema, chronic venous stasis changes full range of motion without deformity. Skin: Bilateral lower extremity 1+ edema present  Neurologic:   Following commands, no focal neurological deficits, no motor or sensory deficits  Psychiatric:   Alert and oriented to person place and time  Capillary Refill: Less than 5 seconds  Peripheral Pulses: +2 palpable, equal bilaterally           Labs:   Recent Labs     12/25/20  1016   WBC 9.3   HGB 14.5   HCT 44.7   *     Recent Labs     12/25/20  1016 12/26/20  0627 12/27/20  0633   * 130* 134*   K 3.9 3.8 4.0   CL 91* 93* 97*   CO2 28 26 26   BUN 62* 70* 75*   CREATININE 1.9* 1.8* 1.5*   CALCIUM 9.0 9.0 9.0   PHOS 3.1 2.8 3.2     No results for input(s): AST, ALT, BILIDIR, BILITOT, ALKPHOS in the last 72 hours. No results for input(s): INR in the last 72 hours. No results for input(s): Larinda Messina in the last 72 hours. Urinalysis:      Lab Results   Component Value Date    NITRU Negative 02/06/2014    WBCUA 0-2 02/06/2014    RBCUA 0-2 02/06/2014    BLOODU TRACE-INTACT 02/06/2014    SPECGRAV 1.015 02/06/2014    GLUCOSEU Negative 02/06/2014       Radiology:  XR CHEST PORTABLE   Final Result   Bilateral airspace disease, increased compared to prior, with increased   left-sided pleural effusion         XR CHEST PORTABLE   Final Result   Multifocal airspace opacities which may represent pulmonary edema,   atelectasis and/or pneumonia.   Findings nonspecific but can be seen with atypical and viral pneumonia. Please correlate with patient history,   particularly of COVID-19 exposure. Assessment/Plan:    Active Hospital Problems    Diagnosis Date Noted    ARDS (adult respiratory distress syndrome) ([facility] [address] [J80]     Pneumonia due to COVID-19 virus [U07.1, J12.89]     Paroxysmal atrial fibrillation (HCC) [I48.0]     Acute respiratory failure with hypoxemia ([facility] [address] [J96.01] 12/22/2020    Acute on chronic systolic heart failure (HCC) [I50.23] 01/17/2018    Biventricular ICD (implantable cardioverter-defibrillator) in place [Z95.810] 09/30/2015     Pneumonia likely COVID-19 but cannot rule out underlying bacterial pneumonia  Acute respiratory failure with hypoxemia, increased work of breathing fatigue tiredness tachypnea on admission  Did not get sepsis protocol fluids as recommended to keep patients on the drier side with COVID19 and with underlying systolic heart failure  Trend inflammatory markers, trending up  Avoid nebulizer treatments, use metered-dose inhalers  Vitamin D 6000 units daily, Zn sulfate 50 mg OD  Decadron, Doxycycline for atypical pneumonia coverage  Prone as much as possible  S/p Convalescent plasma rx x1  Order another dose of Plasma  Discussed with patient and family, agreeable to Remdesivir and will start  Monitor O2 sats, place O2 to maintain for SpO2 > 90%  On BiPAP, needing continous bipap, doesn't want intubation  Agree that he is high risk and would not do well with intubation  Inflammatory markers are trending up, Ferrtin 7000s!!!     Acute on chronic systolic congestive heart failure POA  Continue apixaban 5 mg twice daily for anticoagulation  Continue Lipitor 80 mg nightly  Hold Entresto/spironolactone with borderline hypotension  Reduce Toprol-XL dose to 50 mg once daily  Holding diuretics  Strict input and output  Daily standing weights    History of severe CAD in RCA and LAD  Elevated troponin, likely secondary to cardiac stress from pneumonia Ischemic cardiomyopathy  ICD in place  Continue ASA, continue toprol XL    Hyperglycemia due to Type 2 DM and decadron use  - BG trend reviewed, patient has not had adequate p.o. intake, insulin doses were adjusted    Obesity due to excess calories Body mass index is 38.08 kg/m². - Complicating assessment and treatment. Placing patient at risk for multiple co-morbidities as well as early death and contributing to the patient's presentation.   on weight loss when appropriate.     DVT Prophylaxis: apixaban  Diet: Diet NPO Effective Now Exceptions are: Sips with Meds  Code Status: Limited     PT/OT Eval Status: order once acute issues resolved    Dispo - continue inpatient care, critically ill, grim prognosis    Lennie Kee MD   Hospitalist

## 2020-12-27 NOTE — PROGRESS NOTES
Pt on continuous BiPAP this AM, oxygen saturation remains in the mid 80s. Morning medications held due to unstable oxygenation without the BiPAP.     Electronically signed by Arvind Torres RN on 12/27/2020 at 8:47 AM

## 2020-12-28 NOTE — PROGRESS NOTES
Pulmonary Progress Note    CC:  Follow up hypoxia, COVID    Subjective:  Still on bilevel   Not able to tolerate off bilevel without dropping into the 70's  He does not feel SOB  Mask is annoying to him    ROS  No SOB  Desaturating when off bilevel       Intake/Output Summary (Last 24 hours) at 12/28/2020 0741  Last data filed at 12/28/2020 0345  Gross per 24 hour   Intake 574.74 ml   Output 1000 ml   Net -425.26 ml         PHYSICAL EXAM:  Blood pressure 123/77, pulse 86, temperature 98.1 °F (36.7 °C), temperature source Axillary, resp. rate 28, height 5' 8\" (1.727 m), weight 243 lb 9.7 oz (110.5 kg), SpO2 90 %.'  Gen: Pale, chronically ill and on bilevel   Eyes: PERRL. No sclera icterus. No conjunctival injection. ENT: No discharge. Pharynx with bilevel mask  Neck: Trachea midline. No obvious mass. Resp: Diminished but less accessory muscle use   CV: Distant   GI: Non-tender. Non-distended. No hernia. Skin: Venous stasis   Lymph: No cervical LAD. No supraclavicular LAD. M/S: No cyanosis. No clubbing. No joint deformity. Neuro: Moves all four extremities. CN 2-12 tested, no defect noted.   Ext:   + edema    Medications:    Scheduled Meds:   remdesivir IVPB  100 mg Intravenous Q24H    dexamethasone  10 mg Intravenous Q12H    doxycycline (VIBRAMYCIN) IV  100 mg Intravenous Q12H    insulin glargine  10 Units Subcutaneous BID    metoprolol succinate  50 mg Oral Daily    insulin lispro  0-12 Units Subcutaneous Q4H    Vitamin D  6,000 Units Oral Daily    zinc sulfate  50 mg Oral Daily    apixaban  5 mg Oral BID    atorvastatin  80 mg Oral Nightly    gabapentin  100 mg Oral Nightly    [Held by provider] sacubitril-valsartan  1 tablet Oral BID    spironolactone  25 mg Oral Daily    sodium chloride flush  10 mL Intravenous 2 times per day       Continuous Infusions:   sodium chloride      sodium chloride      dextrose         PRN Meds: sodium chloride, sodium chloride, LORazepam, guaiFENesin-dextromethorphan, sodium chloride, ALPRAZolam, benzonatate, albuterol sulfate HFA, glucose, dextrose, glucagon (rDNA), dextrose, sodium chloride flush, promethazine **OR** ondansetron, polyethylene glycol, acetaminophen **OR** acetaminophen    Labs:  CBC:   Recent Labs     20  1016   WBC 9.3   HGB 14.5   HCT 44.7   MCV 88.9   *     BMP:   Recent Labs     20  1016 20  0627 20  0633   * 130* 138  134*   K 3.9 3.8 4.2  4.0   CL 91* 93* 98*  97*   CO2 28 26 21  26   PHOS 3.1 2.8 3.2   BUN 62* 70* 77*  75*   CREATININE 1.9* 1.8* 1.6*  1.5*     LIVER PROFILE:   Recent Labs     20  0633   AST 28   ALT 19   BILITOT 0.5   ALKPHOS 79     PT/INR: No results for input(s): PROTIME, INR in the last 72 hours. APTT: No results for input(s): APTT in the last 72 hours. UA:No results for input(s): NITRITE, COLORU, PHUR, LABCAST, WBCUA, RBCUA, MUCUS, TRICHOMONAS, YEAST, BACTERIA, CLARITYU, SPECGRAV, LEUKOCYTESUR, UROBILINOGEN, BILIRUBINUR, BLOODU, GLUCOSEU, AMORPHOUS in the last 72 hours. Invalid input(s): Saba Leahy  No results for input(s): PH, PCO2, PO2 in the last 72 hours. Films:  Chest imaging reports were reviewed and imaging was reviewed by me and showed bilateral airspace disease with AICD    AB.33/60/85    Cultures:  Negative    I reviewed the labs and images listed above    Assessment:   · Acute Hypoxic Respiratory Failure with saturations less than 90% on room air  · COVID-19 Pneumonia  · Suspect ARDS  · Cardiomyopathy       Plan:  Titrate oxygen for saturations greater than or equal to 88%. Can always cycle between AVAPS vs S/T mode.   Can trial CPAP if more beneficial.  Attempt off bilevel from time to time for comfort  · Decadron q 12 hours   · Eliquis for DVT prophylaxis  · Do not intubate I think we can continue with cycling between bilevel and vapotherm. Once he is tired of the bilevel or starts to desaturate more then comfort measures would be indicated.             Kitty Burr,   [facility]

## 2020-12-28 NOTE — PROGRESS NOTES
Hospitalist Progress Note      PCP: Teena Squires MD    Date of Admission: 12/22/2020    Chief Complaint: shortness of breath    Subjective:   Unable to get patient off bipap as he desats  Says he is ok, and that you gotta do what you gotta do  Says no sob when on bipap    Medications:  Reviewed    Infusion Medications    sodium chloride      sodium chloride      dextrose       Scheduled Medications    remdesivir IVPB  100 mg Intravenous Q24H    dexamethasone  10 mg Intravenous Q12H    doxycycline (VIBRAMYCIN) IV  100 mg Intravenous Q12H    insulin glargine  10 Units Subcutaneous BID    metoprolol succinate  50 mg Oral Daily    insulin lispro  0-12 Units Subcutaneous Q4H    Vitamin D  6,000 Units Oral Daily    zinc sulfate  50 mg Oral Daily    apixaban  5 mg Oral BID    atorvastatin  80 mg Oral Nightly    gabapentin  100 mg Oral Nightly    [Held by provider] sacubitril-valsartan  1 tablet Oral BID    spironolactone  25 mg Oral Daily    sodium chloride flush  10 mL Intravenous 2 times per day     PRN Meds: sodium chloride, sodium chloride, LORazepam, guaiFENesin-dextromethorphan, sodium chloride, ALPRAZolam, benzonatate, albuterol sulfate HFA, glucose, dextrose, glucagon (rDNA), dextrose, sodium chloride flush, promethazine **OR** ondansetron, polyethylene glycol, acetaminophen **OR** acetaminophen      Intake/Output Summary (Last 24 hours) at 12/28/2020 1205  Last data filed at 12/28/2020 1000  Gross per 24 hour   Intake 604.74 ml   Output 1000 ml   Net -395.26 ml       Physical Exam Performed:    /72   Pulse 89   Temp 97.6 °F (36.4 °C)   Resp 22   Ht 5' 8\" (1.727 m)   Wt 243 lb 9.7 oz (110.5 kg)   SpO2 (!) 89%   BMI 37.04 kg/m²     General appearance:  Morbidly obese male, ill-appearing, looks tired, out of bed in chair HEENT:  Normal cephalic, atraumatic without obvious deformity. Pupils equal, round, and reactive to light. Extra ocular muscles intact. Conjunctivae/corneas clear. Neck: Supple, with full range of motion. JVD was unable to assess due to body habitus  Trachea midline. Respiratory:  On BiPAP, bilateral crackles present at the bases  Limited exam due to body habitus and disposable stethoscope used for exam  Cardiovascular:  Regular rate and rhythm with normal S1/S2 without murmurs, rubs or gallops. Abdomen: Soft, non-tender, non-distended with normal bowel sounds. Musculoskeletal:  No clubbing, cyanosis, 3+ bilateral lower extremity edema, chronic venous stasis changes full range of motion without deformity. Skin: Bilateral lower extremity 1+ edema present  Neurologic:   Following commands, no focal neurological deficits, no motor or sensory deficits  Psychiatric:   Alert and oriented to person place and time  Capillary Refill: Less than 5 seconds  Peripheral Pulses: +2 palpable, equal bilaterally           Labs:   No results for input(s): WBC, HGB, HCT, PLT in the last 72 hours. Recent Labs     12/26/20  0627 12/27/20  0633 12/28/20  0901   * 138  134* 135*   K 3.8 4.2  4.0 4.0   CL 93* 98*  97* 97*   CO2 26 21  26 26   BUN 70* 77*  75* 79*   CREATININE 1.8* 1.6*  1.5* 1.5*   CALCIUM 9.0 9.3  9.0 9.3   PHOS 2.8 3.2 2.8     Recent Labs     12/27/20  0633 12/28/20  0901   AST 28 25   ALT 19 20   BILITOT 0.5 0.6   ALKPHOS 79 92     No results for input(s): INR in the last 72 hours. No results for input(s): Kimi Older in the last 72 hours.     Urinalysis:      Lab Results   Component Value Date    NITRU Negative 02/06/2014    WBCUA 0-2 02/06/2014    RBCUA 0-2 02/06/2014    BLOODU TRACE-INTACT 02/06/2014    SPECGRAV 1.015 02/06/2014    GLUCOSEU Negative 02/06/2014       Radiology:  XR CHEST PORTABLE   Final Result   Bilateral airspace disease, increased compared to prior, with increased left-sided pleural effusion         XR CHEST PORTABLE   Final Result   Multifocal airspace opacities which may represent pulmonary edema,   atelectasis and/or pneumonia. Findings nonspecific but can be seen with   atypical and viral pneumonia. Please correlate with patient history,   particularly of COVID-19 exposure. Assessment/Plan:    Active Hospital Problems    Diagnosis Date Noted    ARDS (adult respiratory distress syndrome) ([facility] [address] [J80]     Pneumonia due to COVID-19 virus [U07.1, J12.89]     Paroxysmal atrial fibrillation (HCC) [I48.0]     Acute respiratory failure with hypoxemia ([facility] [address] [J96.01] 12/22/2020    Acute on chronic systolic heart failure (HCC) [I50.23] 01/17/2018    Biventricular ICD (implantable cardioverter-defibrillator) in place [Z95.810] 09/30/2015     Pneumonia likely COVID-19 but cannot rule out underlying bacterial pneumonia  Acute respiratory failure with hypoxemia, increased work of breathing fatigue tiredness tachypnea on admission  Did not get sepsis protocol fluids as recommended to keep patients on the drier side with COVID19 and with underlying systolic heart failure  Trend inflammatory markers, trending up  Avoid nebulizer treatments, use metered-dose inhalers  Vitamin D 6000 units daily, Zn sulfate 50 mg OD  Decadron, Doxycycline for atypical pneumonia coverage  Prone as much as possible  S/p Convalescent plasma rx x1; per blood bank cannot give more than one plasma per patient  Discussed with patient and family, agreeable to Remdesivir started 12/27  Monitor O2 sats, place O2 to maintain for SpO2 > 90%  On BiPAP, needing continous bipap, doesn't want intubation  Agree that he is high risk and would not do well with intubation  Inflammatory markers are trending up, Ferrtin 8000s!!!     Acute on chronic systolic congestive heart failure POA  Continue apixaban 5 mg twice daily for anticoagulation  Continue Lipitor 80 mg nightly Hold Entresto/spironolactone with borderline hypotension  Reduce Toprol-XL dose to 50 mg once daily  Holding diuretics  Strict input and output  Daily standing weights    History of severe CAD in RCA and LAD  Elevated troponin, likely secondary to cardiac stress from pneumonia  Ischemic cardiomyopathy  ICD in place  Continue ASA, continue toprol XL    Hyperglycemia due to Type 2 DM and decadron use  - BG trend reviewed, patient has not had adequate p.o. intake, insulin doses were adjusted    Obesity due to excess calories Body mass index is 37.04 kg/m². - Complicating assessment and treatment. Placing patient at risk for multiple co-morbidities as well as early death and contributing to the patient's presentation.   on weight loss when appropriate.     DVT Prophylaxis: apixaban  Diet: Diet NPO Effective Now Exceptions are: Sips with Meds  Code Status: Limited     PT/OT Eval Status: order once acute issues resolved    Dispo - continue inpatient care, critically ill, grim prognosis    Roseanna Closs, MD   Hospitalist

## 2020-12-28 NOTE — CONSULTS
PALLIATIVE MEDICINE CONSULTATION     Patient name:Rajesh Rich   MCY:[id number]    :1945  Room/Bed:D0K-4352/5275-01   LOS: 6 days         Date of consult:2020    Consult Information  Palliative Medicine Consult performed by: Sandie Ogden    Requested by: Dr Natasha David  Reason for consult: Goal of Care, code status     Number of admissions past 12 months:     ASSESSMENT/RECOMMENDATIONS     70 y.o. male with hypoxia related to COVID, CHF. Symptom Management:  1. Hypoxia- pt wants no intubation. His respiratory status is stable, however his inflammatory markers are climbing  2. CHF- contributing to hypoxia and high risk status  3. Goals of Care- pt is a DNR per his report, updated code status, will continue to follow and work on disposition    Patient/Family Goals of Care :    Pt wants no intubation or CPR he states that he knows that his heart is crappy and that he doesn't want any tubes, he understands his poor prognosis with co-morbidities    Disposition/Discharge Plan:   Pending    Advance Directives:  · Surrogate Decision Maker: self  · Code status:  Limited    Case discussed with: patient and floor RN  Thank you for allowing us to participate in the care of this patient. HISTORY     CC: Hypoxia  HPI: The patient is a 70 y.o. male with history of severe CAD in the RCA and LAD, hyperlipidemia, hypertension, ischemic cardiomyopathy with left ventricle ejection fraction 30 to 45% and congestive heart failure. He underwent ICD placement in 2015. Presented to ED with complains of shortness of breath, he got outpatient Covid testing done which was positive. Palliative Medicine SymptomScreening/ROS:  Review of Systems - History obtained from RN and chart review    A complete 10 count ROS was obtained. Pertinent positives mentioned above in HPI/ROS. All others if not mentioned are negative.      Pain:  Denies Home med list and hospital medications reviewed in chart as of 12/28/2020     EXAM     Vitals:    12/28/20 1220   BP:    Pulse:    Resp:    Temp:    SpO2: 92%       Physical Examination:   Due to the current efforts to prevent transmission of COVID-19 and also the need to preserve PPE for other caregivers, a face-to-face encounter with the patient was not performed. That being said, all relevant records and diagnostic tests were reviewed, including laboratory results and imaging. Please reference any relevant documentation elsewhere. Care will be coordinated with the primary service.          Current labs in the epic chart reviewed as of 12/28/2020   Review of previous notes, admits, labs, radiology and testing relevant to this consult done in this chart today 12/28/2020    Signed By: Electronically signed by MILAGRO Reddy CNP on 12/28/2020 at 2:19 PM      Palliative Medicine   [phone]    December 28, 2020

## 2020-12-28 NOTE — PLAN OF CARE
Problem: OXYGENATION/RESPIRATORY FUNCTION  Goal: Patient will maintain patent airway  12/28/2020 0956 by Glory Mao RN  Outcome: Ongoing     Problem: OXYGENATION/RESPIRATORY FUNCTION  Goal: Patient will achieve/maintain normal respiratory rate/effort  12/28/2020 0956 by Glory Mao RN  Outcome: Ongoing     Problem: HEMODYNAMIC STATUS  Goal: Patient has stable vital signs and fluid balance  12/28/2020 0956 by Glory Mao RN  Outcome: Ongoing     Problem: FLUID AND ELECTROLYTE IMBALANCE  Goal: Fluid and electrolyte balance are achieved/maintained  12/28/2020 0956 by Glory Mao RN  Outcome: Ongoing     Problem: ACTIVITY INTOLERANCE/IMPAIRED MOBILITY  Goal: Mobility/activity is maintained at optimum level for patient  12/28/2020 0956 by Glory Mao RN  Outcome: Ongoing     Problem: Falls - Risk of:  Goal: Will remain free from falls  12/28/2020 0956 by Glory Mao RN  Outcome: Ongoing     Problem: Falls - Risk of:  Goal: Absence of physical injury  12/28/2020 0956 by Glory Mao RN  Outcome: Ongoing     Problem: Skin Integrity:  Goal: Will show no infection signs and symptoms  12/28/2020 0956 by Glory Mao RN  Outcome: Ongoing     Problem: Skin Integrity:  Goal: Absence of new skin breakdown  12/28/2020 0956 by Glory Mao RN  Outcome: Ongoing     Problem: Breathing Pattern - Ineffective:  Goal: Ability to achieve and maintain a regular respiratory rate will improve  12/28/2020 0956 by Glory Mao RN  Outcome: Ongoing     Problem:  Activity:  Goal: Ability to tolerate increased activity will improve  12/28/2020 0956 by Glory Mao RN  Outcome: Ongoing

## 2020-12-28 NOTE — PLAN OF CARE
Problem: OXYGENATION/RESPIRATORY FUNCTION  Goal: Patient will maintain patent airway  Outcome: Met This Shift  Goal: Patient will achieve/maintain normal respiratory rate/effort  Description: Respiratory rate and effort will be within normal limits for the patient  Outcome: Ongoing   Alert and oriented x4 Dyspnic with min. Exertion Sats. Drop at times to high 80's with ministerio. BIPAP but come up quickly. Lungs diminished. Cough and deep breathing exercises encouraged. Cont. Per urinal Up to chair and returned to bed with walker and SBA Free from fall/injury.  Frequently turns and repositions self

## 2020-12-28 NOTE — PROGRESS NOTES
Occupational Therapy Attempt    Attempted to see pt for OT at this time. Per chart, pt remains on bipap. RN, Millie Sebastian, requests to defer therapy at this time 2/2 pt's respiratory status. Will reattempt as schedule allows and pt's medical status permits.     Electronically signed by Danielito Akers OT on 12/28/20 at 1:27 PM EST

## 2020-12-29 PROBLEM — E44.0 MODERATE MALNUTRITION (HCC): Chronic | Status: ACTIVE | Noted: 2020-01-01

## 2020-12-29 NOTE — PROGRESS NOTES
Physical Therapy  PT Rx held today per  Nursing request due to current Respiratory concerns. Will cont current POC as approp.   Thank you, Alexandrea Polk, PT [id number]

## 2020-12-29 NOTE — PROGRESS NOTES
Hospitalist Progress Note      PCP: Gilda Valenzuela MD    Date of Admission: 12/22/2020    Chief Complaint: shortness of breath    Subjective:   He if off bipap to eat food, on cont pulse ox anywhere between 84-89% on 40L vapotherm  He doesn't feel short of breath, says he feels good and ok with bipap as long as he can get breaks from bipap for meals on vapotherm  Says he has nasal congestion    Medications:  Reviewed    Infusion Medications    sodium chloride      sodium chloride      dextrose       Scheduled Medications    doxycycline hyclate  100 mg Oral 2 times per day    remdesivir IVPB  100 mg Intravenous Q24H    dexamethasone  10 mg Intravenous Q12H    insulin glargine  10 Units Subcutaneous BID    metoprolol succinate  50 mg Oral Daily    insulin lispro  0-12 Units Subcutaneous Q4H    Vitamin D  6,000 Units Oral Daily    zinc sulfate  50 mg Oral Daily    apixaban  5 mg Oral BID    atorvastatin  80 mg Oral Nightly    gabapentin  100 mg Oral Nightly    sacubitril-valsartan  1 tablet Oral BID    spironolactone  25 mg Oral Daily    sodium chloride flush  10 mL Intravenous 2 times per day     PRN Meds: sodium chloride, sodium chloride, sodium chloride, LORazepam, guaiFENesin-dextromethorphan, sodium chloride, ALPRAZolam, benzonatate, albuterol sulfate HFA, glucose, dextrose, glucagon (rDNA), dextrose, sodium chloride flush, promethazine **OR** ondansetron, polyethylene glycol, acetaminophen **OR** acetaminophen      Intake/Output Summary (Last 24 hours) at 12/29/2020 1326  Last data filed at 12/29/2020 0341  Gross per 24 hour   Intake 150 ml   Output 1000 ml   Net -850 ml       Physical Exam Performed:    /74   Pulse 89   Temp 97.6 °F (36.4 °C) (Axillary)   Resp 20   Ht 5' 8\" (1.727 m)   Wt 242 lb 11.6 oz (110.1 kg)   SpO2 (!) 87%   BMI 36.91 kg/m²     General appearance:  Morbidly obese male, ill-appearing HEENT:  Normal cephalic, atraumatic without obvious deformity. Pupils equal, round, and reactive to light. Extra ocular muscles intact. Conjunctivae/corneas clear. Neck: Supple, with full range of motion. JVD was unable to assess due to body habitus  Trachea midline. Respiratory: On vapotherm, bilateral crackles present at the bases  Limited exam due to body habitus and disposable stethoscope used for exam  Cardiovascular:  Regular rate and rhythm with normal S1/S2 without murmurs, rubs or gallops. Abdomen: Soft, non-tender, non-distended with normal bowel sounds. Musculoskeletal:  No clubbing, cyanosis, 3+ bilateral lower extremity edema, chronic venous stasis changes full range of motion without deformity. Skin: Bilateral lower extremity 1+ edema present  Neurologic:   Following commands, no focal neurological deficits, no motor or sensory deficits  Psychiatric:   Alert and oriented to person place and time  Capillary Refill: Less than 5 seconds  Peripheral Pulses: +2 palpable, equal bilaterally           Labs:   No results for input(s): WBC, HGB, HCT, PLT in the last 72 hours. Recent Labs     12/27/20  0633 12/28/20  0901 12/29/20  0859     134* 135* 135*   K 4.2  4.0 4.0 5.2*   CL 98*  97* 97* 96*   CO2 21  26 26 27   BUN 77*  75* 79* 74*   CREATININE 1.6*  1.5* 1.5* 1.4*   CALCIUM 9.3  9.0 9.3 9.7   PHOS 3.2 2.8 3.3     Recent Labs     12/27/20  0633 12/28/20  0901 12/29/20  0859   AST 28 25 43*   ALT 19 20 23   BILITOT 0.5 0.6 0.8   ALKPHOS 79 92 103     No results for input(s): INR in the last 72 hours. No results for input(s): [facility] in the last 72 hours.     Urinalysis:      Lab Results   Component Value Date    NITRU Negative 02/06/2014    WBCUA 0-2 02/06/2014    RBCUA 0-2 02/06/2014    BLOODU TRACE-INTACT 02/06/2014    SPECGRAV 1.015 02/06/2014    GLUCOSEU Negative 02/06/2014       Radiology:  XR CHEST PORTABLE   Final Result Acute on chronic systolic congestive heart failure POA  Continue apixaban 5 mg twice daily for anticoagulation  Holding lipitor for now  Continue entresto/spironolactone with borderline hypotension  He is on toprol  mg at home, will increase to home dose (will make 50 mg bid)  Holding diuretics  Strict input and output  Daily standing weights    History of severe CAD in RCA and LAD  Elevated troponin, likely secondary to cardiac stress from pneumonia  Ischemic cardiomyopathy  ICD in place  Continue ASA/other meds as above    Hyperglycemia due to Type 2 DM and decadron use  - BG trend reviewed, patient has not had adequate p.o. intake, insulin doses were adjusted    Obesity due to excess calories Body mass index is 36.91 kg/m². - Complicating assessment and treatment. Placing patient at risk for multiple co-morbidities as well as early death and contributing to the patient's presentation.  on weight loss when appropriate.     DVT Prophylaxis: apixaban  Diet: DIET CARB CONTROL; Carb Control: 4 carb choices (60 gms)/meal; Low Sodium (2 GM);  Daily Fluid Restriction: 1500 ml  Dietary Nutrition Supplements: Low Volume Supplement  Code Status: DNR-CC (seen by palliative care 12/28)    PT/OT Eval Status: order once acute issues resolved    Dispo - continue inpatient care, critically ill    Hang Nance MD   Hospitalist

## 2020-12-29 NOTE — PLAN OF CARE
Problem: OXYGENATION/RESPIRATORY FUNCTION  Goal: Patient will maintain patent airway  Outcome: Ongoing  Goal: Patient will achieve/maintain normal respiratory rate/effort  Description: Respiratory rate and effort will be within normal limits for the patient  Outcome: Ongoing     Problem: HEMODYNAMIC STATUS  Goal: Patient has stable vital signs and fluid balance  Outcome: Ongoing     Problem: FLUID AND ELECTROLYTE IMBALANCE  Goal: Fluid and electrolyte balance are achieved/maintained  Outcome: Ongoing     Problem: ACTIVITY INTOLERANCE/IMPAIRED MOBILITY  Goal: Mobility/activity is maintained at optimum level for patient  Outcome: Ongoing     Problem: Falls - Risk of:  Goal: Will remain free from falls  Description: Will remain free from falls  Outcome: Ongoing  Goal: Absence of physical injury  Description: Absence of physical injury  Outcome: Ongoing     Problem: Skin Integrity:  Goal: Will show no infection signs and symptoms  Description: Will show no infection signs and symptoms  Outcome: Ongoing  Goal: Absence of new skin breakdown  Description: Absence of new skin breakdown  Outcome: Ongoing     Problem: Breathing Pattern - Ineffective:  Goal: Ability to achieve and maintain a regular respiratory rate will improve  Description: Ability to achieve and maintain a regular respiratory rate will improve  Outcome: Ongoing     Problem: Activity:  Goal: Ability to tolerate increased activity will improve  Description: Ability to tolerate increased activity will improve  Outcome: Ongoing     Problem: Airway Clearance - Ineffective  Goal: Achieve or maintain patent airway  Outcome: Ongoing     Problem: Gas Exchange - Impaired  Goal: Absence of hypoxia  Outcome: Ongoing  Goal: Promote optimal lung function  Outcome: Ongoing     Problem: Breathing Pattern - Ineffective  Goal: Ability to achieve and maintain a regular respiratory rate  Outcome: Ongoing     Problem:  Body Temperature -  Risk of, Imbalanced Goal: Ability to maintain a body temperature within defined limits  Outcome: Ongoing  Goal: Will regain or maintain usual level of consciousness  Outcome: Ongoing  Goal: Complications related to the disease process, condition or treatment will be avoided or minimized  Outcome: Ongoing     Problem: Isolation Precautions - Risk of Spread of Infection  Goal: Prevent transmission of infection  Outcome: Ongoing     Problem: Nutrition Deficits  Goal: Optimize nutrtional status  Outcome: Ongoing     Problem: Risk for Fluid Volume Deficit  Goal: Maintain normal heart rhythm  Outcome: Ongoing  Goal: Maintain absence of muscle cramping  Outcome: Ongoing  Goal: Maintain normal serum potassium, sodium, calcium, phosphorus, and pH  Outcome: Ongoing     Problem: Loneliness or Risk for Loneliness  Goal: Demonstrate positive use of time alone when socialization is not possible  Outcome: Ongoing     Problem: Fatigue  Goal: Verbalize increase energy and improved vitality  Outcome: Ongoing     Problem: Patient Education: Go to Patient Education Activity  Goal: Patient/Family Education  Outcome: Ongoing

## 2020-12-29 NOTE — PROGRESS NOTES
PALLIATIVE MEDICINE PROGRESS NOTE     Patient name:Rajesh Rojas    FOJ:[id number] :1945  Room/Bed:M9H-9473/5275-01    LOS: 7 days        ASSESSMENT/RECOMMENDATIONS     70 y.o. male with hypoxia related to COVID, CHF.         Symptom Management:  1. Hypoxia- pt wants no intubation. His respiratory status is stable, however his inflammatory markers are climbing. Discussed this with patient today  2. CHF- contributing to hypoxia and high risk status  3. Goals of Care- pt is a DNR per his report, updated code status, will continue to follow and work on disposition     Patient/Family Goals of Care :    Pt wants no intubation or CPR he states that he knows that his heart is crappy and that he doesn't want any tubes, he understands his poor prognosis with co-morbidities    Discussed CODE STATUS with spouse-Sylvie today too she is in agreement with [facility]. Discussed with pt and spouse that recovery from [place] is a long road and that pt resp status may wax and wane.      Disposition/Discharge Plan:   Pending     Advance Directives:  · Surrogate Decision Maker: SpouseMarilee Reddy  · Code status:  Limited     Case discussed with: patient and floor RN  Thank you for allowing us to participate in the care of this patient. SUBJECTIVE     Chief Complaint: Hypoxia    Last 24 hours: Off Bipap on NRB and Vapotherm maintaining oxy sat in mid-80s. ROS:  Review of Systems - History obtained from the patient  General ROS: positive for  - fatigue and malaise  Respiratory ROS: positive for - cough, shortness of breath, sputum changes and tachypnea  Musculoskeletal ROS: positive for - muscular weakness     A complete 10 count ROS was obtained. Pertinent positives mentioned above in HPI/ROS. All others if not mentioned are negative.             OBJECTIVE   /74   Pulse 89   Temp 97.6 °F (36.4 °C) (Axillary)   Resp 20   Ht 5' 8\" (1.727 m)   Wt 242 lb 11.6 oz (110.1 kg)   SpO2 (!) 87%   BMI 36.91 kg/m² I/O last 3 completed shifts: In: 180 [P.O.:80; IV Piggyback:100]  Out: 1000 [Urine:1000]  No intake/output data recorded.       Physical Examination: General appearance - oriented to person, place, and time and in mild to moderate distress  Mental status - alert, oriented to person, place, and time  Lymphatics - no palpable lymphadenopathy, no hepatosplenomegaly  Chest - no tachypnea, retractions or cyanosis, wheezing noted   Abdomen - soft, nontender, nondistended, no masses or organomegaly  Musculoskeletal - no joint tenderness, deformity or swelling  Skin - normal coloration and turgor, no rashes, no suspicious skin lesions noted         Signed By: Electronically signed by MILAGRO Galvez CNP on 12/29/2020 at 1:43 PM   Palliative Medicine   [phone]    December 29, 2020

## 2020-12-29 NOTE — PROGRESS NOTES
· BMI Categories: Obese Class 2 (BMI 35.0 -39.9)       Nutrition Diagnosis:   · Inadequate oral intake related to impaired respiratory function as evidenced by intake 0-25%, other (comment)(Continuous BiPAP)      Nutrition Interventions:   Food and/or Nutrient Delivery:  Continue Current Diet, Start Oral Nutrition Supplement  Nutrition Education/Counseling:  Education not indicated, No recommendation at this time   Coordination of Nutrition Care:  Continue to monitor while inpatient    Goals:  Meal and supplement intake greater than 50%       Nutrition Monitoring and Evaluation:   Behavioral-Environmental Outcomes:  None Identified   Food/Nutrient Intake Outcomes:  Food and Nutrient Intake, Supplement Intake  Physical Signs/Symptoms Outcomes:  Biochemical Data, Weight, Other (Comment), Fluid Status or Edema(Respiratory status)     Discharge Planning:     Too soon to determine     Electronically signed by Heather Zhang RD, LD on 12/29/20 at 9:05 AM EST    Contact: [phone]

## 2020-12-29 NOTE — PROGRESS NOTES
Occupational Therapy  Unable to see pt at this time due to pt just recently taken off of BiPAP to trial decreased O2 need at this time. Pt currently maintaining in upper 80s. Will hold therapy at this time and try back as time allows. Continue with POC.     Meet Abbasi, OTR/L

## 2020-12-29 NOTE — PROGRESS NOTES
Physical Therapy  Facility/Department: [address] PROGRESSIVE CARE  Daily Treatment Note  NAME: Brian Upton  : 1945  MRN: [id number]    Date of Service: 2020    Discharge Recommendations:  Continue to assess pending progress   PT Equipment Recommendations  Other: Will monitor for potential equipt needs. Assessment   Body structures, Functions, Activity limitations: Decreased functional mobility ; Decreased strength;Decreased safe awareness;Decreased cognition;Decreased endurance  Assessment: 69 y/o male admit 2020 with Pneumonia, Acute Respiratory, COVID +. PMH as noted including CAD, CHF, Cardiomyopathy, Defib Place,OA. PTA pt living with wife in mobile home with few steps to enter. Pt reports independent with daily care and functional mobility (use of cane, Rollator prn). At this time, pt appears very fatigued/limited george to activity only at bedside. Pt currently requiring O2 via Bipap. Will need to  monitor pt's progress, hopeful increase activity george as O2 needs decrease. Prognosis: Fair;Good  Decision Making: Medium Complexity  History: 69 y/o male admit 2020 with Pneumonia, Acute Respiratory, COVID +. PMH as noted including CAD, CHF, Cardiomyopathy, Defib Place,OA. Exam: See above. Clinical Presentation: See above. Patient Education: Role of PT, POC, Need to call for assist.  Barriers to Learning: Cognitive, Endurance. REQUIRES PT FOLLOW UP: Yes  Activity Tolerance  Activity Tolerance: Patient limited by endurance  Activity Tolerance: Pt now requiring high levels of O2 (currently on Bipap). George ex/oob to chair with Yeni Ly. Very limited endurance. Patient Diagnosis(es): The primary encounter diagnosis was Pneumonia due to COVID-19 virus. Diagnoses of Hypoxemia requiring supplemental oxygen and Type 2 diabetes mellitus with hyperglycemia, unspecified whether long term insulin use ([facility] [address] were also pertinent to this visit. has a past medical history of Acute anxiety, Acute on chronic systolic congestive heart failure ([address] Anemia, Arthritis, Atrial fibrillation ([address] Bronchiolitis obliterans organizing pneumonia ([address] CAD (coronary artery disease), CHF (congestive heart failure) ([address] Congestive heart failure, unspecified, Disease of blood and blood forming organ, Hyperlipidemia, Hypertension, Kidney stone, Neuropathy, Osteoarthritis, Pneumonia, Pure hypercholesterolemia, Seasonal allergies, Sleep apnea, Type II or unspecified type diabetes mellitus without mention of complication, not stated as uncontrolled, and Type II or unspecified type diabetes mellitus without mention of complication, uncontrolled. has a past surgical history that includes Coronary angioplasty with stent (2000); Hemorrhoid surgery; Coronary angioplasty with stent (13.6736); Cardiac defibrillator placement; ablation of dysrhythmic focus (11/06/2017); pacemaker placement; Intracapsular cataract extraction (Right, 1/14/2020); and Cataract removal.    Restrictions  Restrictions/Precautions  Restrictions/Precautions: Isolation, Fall Risk  Position Activity Restriction  Other position/activity restrictions: Droplet Plus : COVID +. Bipap currently in use. Nursing reports ok for ex/activity as george. Subjective   General  Chart Reviewed: Yes  Additional Pertinent Hx: 71 y/o male admit 12/22/2020 with Pneumonia, Acute Respiratory, COVID +. PMH as noted including CAD, CHF, Cardiomyopathy, Difib Place,OA. Response To Previous Treatment: Patient with no complaints from previous session. Family / Caregiver Present: No  Referring Practitioner: Dr. Shad Burt. Subjective  Subjective: Pt agreeable to PT Rx.           Orientation  Orientation  Overall Orientation Status: Impaired(Pt cont alittle confused recent events.)  Cognition   Cognition  Arousal/Alertness: Appropriate responses to stimuli  Attention Span: Appears intact  Memory: Decreased recall of recent events Safety Judgement: Decreased awareness of need for safety  Insights: Decreased awareness of deficits  Objective   Bed mobility  Supine to Sit: Stand by assistance  Transfers  Sit to Stand: Minimal Assistance(With Walker. Cues for safe hand placement.)  Stand to sit: Minimal Assistance(With Walker. Cues for safe hand placement.)  Ambulation  Ambulation?: Yes  Ambulation 1  Surface: level tile  Device: Rolling Walker  Distance: 5-6 steps bed to chair  with [employer] assist.  Cues for upright posture, diminished step length/clearance. Very limited endurance/george to activity. Exercises  Quad Sets: 10x  Gluteal Sets: 10x  Ankle Pumps: 20x            AM-PAC Score  AM-PAC Inpatient Mobility Raw Score : 15 (12/29/20 1043)  AM-PAC Inpatient T-Scale Score : 39.45 (12/29/20 1043)  Mobility Inpatient CMS 0-100% Score: 57.7 (12/29/20 1043)  Mobility Inpatient CMS G-Code Modifier : CK (12/29/20 1043)          Goals  Short term goals  Time Frame for Short term goals: Upon d/c acute care setting. Short term goal 1: Bed Mob Independent. Short term goal 2: Transfers with assist device SBA/Supervision. Short term goal 3: Amb with/without assist device 50' SBA. Patient Goals   Patient goals : Go home with wife. Plan    Plan  Times per week: 3-5x week while in acute care setting.   Current Treatment Recommendations: Strengthening, Functional Mobility Training, Transfer Training, Gait Training, Safety Education & Training, Patient/Caregiver Education & Training  Safety Devices  Type of devices: Call light within reach, Chair alarm in place, Left in chair, Nurse notified     Therapy Time   Individual Concurrent Group Co-treatment   Time In 0915         Time Out 0940         Minutes [address] [place] [address],

## 2020-12-30 NOTE — PROGRESS NOTES
Respiratory: On vapotherm, decreased breathing sounds bilaterally  Limited exam due to body habitus and disposable stethoscope used for exam  Cardiovascular:  Regular rate and rhythm with normal S1/S2 without murmurs, rubs or gallops. Abdomen: Soft, non-tender, non-distended with normal bowel sounds. Musculoskeletal:  No clubbing, cyanosis, 3+ bilateral lower extremity edema, chronic venous stasis changes full range of motion without deformity. Skin: Bilateral lower extremity 1+ edema present  Neurologic:   Following commands, no focal neurological deficits, no motor or sensory deficits  Psychiatric:   Alert and oriented to person place and time  Peripheral Pulses: +2 palpable, equal bilaterally           Labs:   No results for input(s): WBC, HGB, HCT, PLT in the last 72 hours. Recent Labs     12/28/20  0901 12/29/20  0859 12/30/20  0641   * 135* 133*   K 4.0 5.2* 4.4   CL 97* 96* 96*   CO2 26 27 25   BUN 79* 74* 77*   CREATININE 1.5* 1.4* 1.5*   CALCIUM 9.3 9.7 9.1   PHOS 2.8 3.3 2.7     Recent Labs     12/28/20  0901 12/29/20  0859 12/30/20  0641   AST 25 43* 30   ALT 20 23 26   BILITOT 0.6 0.8 0.7   ALKPHOS 92 103 97     No results for input(s): INR in the last 72 hours. No results for input(s): Teryl Drown in the last 72 hours. Urinalysis:      Lab Results   Component Value Date    NITRU Negative 02/06/2014    WBCUA 0-2 02/06/2014    RBCUA 0-2 02/06/2014    BLOODU TRACE-INTACT 02/06/2014    SPECGRAV 1.015 02/06/2014    GLUCOSEU Negative 02/06/2014       Radiology:  XR CHEST PORTABLE   Final Result   Bilateral airspace disease, increased compared to prior, with increased   left-sided pleural effusion         XR CHEST PORTABLE   Final Result   Multifocal airspace opacities which may represent pulmonary edema,   atelectasis and/or pneumonia. Findings nonspecific but can be seen with   atypical and viral pneumonia. Please correlate with patient history,   particularly of COVID-19 exposure. Assessment/Plan:    Active Hospital Problems    Diagnosis Date Noted    ARDS (adult respiratory distress syndrome) ([facility] [address] [J80]     Pneumonia due to COVID-19 virus [U07.1, J12.89]     Paroxysmal atrial fibrillation (HCC) [I48.0]     Acute respiratory failure with hypoxemia ([facility] [address] [J96.01] 12/22/2020    Acute on chronic systolic heart failure (HCC) [I50.23] 01/17/2018    Biventricular ICD (implantable cardioverter-defibrillator) in place [Z95.810] 09/30/2015       COVID-19 pneumonia  Acute on chronic systolic heart failure  CAD  Elevated troponin  Ischemic cardiomyopathy s/p ICD  Diabetes mellitus  Obesity due to excess calories    Plan  Currently on Vapotherm, continue to wean down oxygen as tolerated, pulmonary following  On remdesivir, stop date 1/1/2021  Continue doxycycline, statin, Eliquis, Toprol, Aldactone  Monitor on cardiac telemetry  Insulin sliding scale  DVT Prophylaxis: apixaban  Diet: DIET CARB CONTROL; Carb Control: 4 carb choices (60 gms)/meal; Low Sodium (2 GM);  Daily Fluid Restriction: 1500 ml  Dietary Nutrition Supplements: Low Volume Supplement  Code Status: DNR-CC (seen by palliative care 12/28)    PT/OT Eval Status: order once acute issues resolved    Dispo -continue to wean down oxygen as tolerated, likely discharge 3 to 4 days    Diony Davidson MD   Hospitalist

## 2020-12-30 NOTE — PROGRESS NOTES
Pulmonary Progress Note    CC:  Follow up hypoxia, COVID    Subjective:  Spent some time off of bilevel yesterday. Saturations were mid 80's but he was not SOB and continues to not feel significantly SOB  He said he never got SOB yesterday  Doing ok overall. Not feeling any worse    ROS  No SOB        Intake/Output Summary (Last 24 hours) at 12/30/2020 0709  Last data filed at 12/30/2020 0600  Gross per 24 hour   Intake 900 ml   Output 850 ml   Net 50 ml         PHYSICAL EXAM:  Blood pressure 107/69, pulse 88, temperature 98 °F (36.7 °C), temperature source Axillary, resp. rate 30, height 5' 8\" (1.727 m), weight 246 lb 4.1 oz (111.7 kg), SpO2 95 %.'  Gen: Pale, chronically ill and on bilevel   Eyes: PERRL. No sclera icterus. No conjunctival injection. ENT: No discharge. Pharynx with bilevel mask  Neck: Trachea midline. No obvious mass. Resp: Diminished but calm with bilevel   CV: Distant   GI: Non-tender. Non-distended. No hernia. Skin: Venous stasis   Lymph: No cervical LAD. No supraclavicular LAD. M/S: No cyanosis. No clubbing. No joint deformity. Neuro: Moves all four extremities. CN 2-12 tested, no defect noted.   Ext:   + edema    Medications:    Scheduled Meds:   doxycycline hyclate  100 mg Oral 2 times per day    metoprolol succinate  50 mg Oral BID    remdesivir IVPB  100 mg Intravenous Q24H    dexamethasone  10 mg Intravenous Q12H    insulin glargine  10 Units Subcutaneous BID    insulin lispro  0-12 Units Subcutaneous Q4H    Vitamin D  6,000 Units Oral Daily    zinc sulfate  50 mg Oral Daily    apixaban  5 mg Oral BID    atorvastatin  80 mg Oral Nightly    gabapentin  100 mg Oral Nightly    sacubitril-valsartan  1 tablet Oral BID    spironolactone  25 mg Oral Daily    sodium chloride flush  10 mL Intravenous 2 times per day       Continuous Infusions:   sodium chloride      sodium chloride      dextrose         PRN Meds:

## 2020-12-30 NOTE — PROGRESS NOTES
Occupational Therapy  [facility]  Daily Treatment Note  NAME: Preethi Perez  : 1945  MRN: [id number]    Date of Service: 2020    Discharge Recommendations:  Continue to assess pending progress     Preethi Perez scored a 16/ on the AM-PAC ADL Inpatient form. If patient discharges prior to next session this note will serve as a discharge summary. Please see below for the latest assessment towards goals. Assessment   Performance deficits / Impairments: Decreased functional mobility ; Decreased endurance;Decreased ADL status; Decreased safe awareness;Decreased balance  Assessment: Pt tolerated short tx session fair, limited d/t high supplemental O2 needs and decreased activity tolerance. Pt completed x10 mins B UE therex while seated in recliner, tolerated well with rest breaks prn. Pt's O2 sats remained above 90% throughout on bipap. Pt would be unsafe to return home at this time d/t medical status, and will have to continue to monitor pt's progress in order to determine most appropriate d/c plan. Prognosis: Good  OT Education: Plan of Care;OT Role;Energy Conservation;Home Exercise Program  REQUIRES OT FOLLOW UP: Yes  Activity Tolerance  Activity Tolerance: Patient limited by fatigue  Activity Tolerance: limited to seated activity only at this time d/t pt on bipap  Safety Devices  Safety Devices in place: Yes  Type of devices: Call light within reach; Patient at risk for falls; Chair alarm in place; Left in chair         Patient Diagnosis(es): The primary encounter diagnosis was Pneumonia due to COVID-19 virus. Diagnoses of Hypoxemia requiring supplemental oxygen and Type 2 diabetes mellitus with hyperglycemia, unspecified whether long term insulin use ([facility] [address] were also pertinent to this visit. has a past medical history of Acute anxiety, Acute on chronic systolic congestive heart failure ([address] Anemia, Arthritis, Atrial fibrillation ([facility] [address] Bronchiolitis obliterans organizing pneumonia ([facility] [address] CAD (coronary artery disease), CHF (congestive heart failure) ([facility] [address] Congestive heart failure, unspecified, Disease of blood and blood forming organ, Hyperlipidemia, Hypertension, Kidney stone, Neuropathy, Osteoarthritis, Pneumonia, Pure hypercholesterolemia, Seasonal allergies, Sleep apnea, Type II or unspecified type diabetes mellitus without mention of complication, not stated as uncontrolled, and Type II or unspecified type diabetes mellitus without mention of complication, uncontrolled. has a past surgical history that includes Coronary angioplasty with stent (2000); Hemorrhoid surgery; Coronary angioplasty with stent (19.6986); Cardiac defibrillator placement; ablation of dysrhythmic focus (11/06/2017); pacemaker placement; Intracapsular cataract extraction (Right, 1/14/2020); and Cataract removal.    Restrictions  Restrictions/Precautions  Restrictions/Precautions: Isolation, Fall Risk  Position Activity Restriction  Other position/activity restrictions: Droplet Plus : COVID +. Bipap currently in use. Nursing reports ok for ex/activity as george.   Subjective   General  Chart Reviewed: Yes  Patient assessed for rehabilitation services?: Yes Additional Pertinent Hx: per H&P: \"71 y.o. male Preethi Perez 42-year-old kate with history of severe CAD in the RCA and LAD, hyperlipidemia, hypertension, ischemic cardiomyopathy with left ventricle ejection fraction 30 to 45% and congestive heart failure. He underwent ICD placement in 9/2015.-Presented to ED with complains of shortness of breath, he got outpatient Covid testing done which was positive. Noted to be 88% on room air, associate with fatigue, nonproductive cough, temperature of 100.2. He was placed on 2 L nasal cannula oxygen with improvement in oxygen saturation to mid 90s. He tells me that he has not been compliant with his diet, he has been drinking a lot of pops does not follow a strict sodium diet nor of strict diabetic diet. He does not know his dry weight but says he has weight has been around 245. [facility] sodium 130, chloride 88, BUN 34, creatinine 1.7, lactic acid rated 2.2, glucose 396, procalcitonin 0.21, CRP elevated 148.5, , proBNP 10,945, initial troponin 0.05, D-dimer 528, fibrinogen 718. Patient being admitted for acute respiratory failure with hypoxemia secondary to COVID-19 pneumonia and acute on chronic systolic congestive heart failure. \"  Family / Caregiver Present: No  Referring Practitioner: Rip  Diagnosis: Acute respiratory failure with hypoxemia; COVID-19  Subjective  Subjective: pt met b/s for OT. pt in recliner, agreeable to seated activity.  pt denied pain  General Comment  Comments: RN cleared pt for activity as pt is able to tolerate      Orientation     Objective             Balance  Sitting Balance: Stand by assistance(in recliner)                             Cognition  Arousal/Alertness: Appropriate responses to stimuli  Attention Span: Appears intact  Memory: Decreased recall of recent events  Safety Judgement: Decreased awareness of need for safety  Insights: Decreased awareness of deficits Type of ROM/Therapeutic Exercise  Comment: pt participated in B UE therex while seated in recliner, tolerated well with O2 sats remaining above 90% on bipap throughout  Exercises  Shoulder Depression: 10  Shoulder Elevation: 10  Shoulder Flexion: 10  Shoulder Extension: 10  Elbow Flexion: 10  Elbow Extension: 10  Supination: 10  Pronation: 10  Wrist Flexion: 10  Wrist Extension: 10  Grasp/Release: 10  Other: OT performed and educated pt in edema massage to reduce swelling in both hands, pt tolerated well                    Plan   Plan  Times per week: 3-5  Times per day: Daily  Current Treatment Recommendations: Strengthening, Endurance Training, ROM, Balance Training, Functional Mobility Training, Safety Education & Training, Self-Care / ADL, Equipment Evaluation, Education, & procurement, Patient/Caregiver Education & Training       AM-PAC Score        AM-[facility] Inpatient Daily Activity Raw Score: 16 (12/30/20 1131)  AM-PAC Inpatient ADL T-Scale Score : 35.96 (12/30/20 1131)  ADL Inpatient CMS 0-100% Score: 53.32 (12/30/20 1131)  ADL Inpatient CMS G-Code Modifier : CK (12/30/20 1131)    Goals  Short term goals  Time Frame for Short term goals: prior to d/c  Short term goal 1: Pt will complete fxl transfers mod I  Short term goal 2: Pt will complete fxl mobility mod I  Short term goal 3: Pt will toilet mod I  Short term goal 4: Pt will groom in stance at sink mod I  Short term goal 5: Pt will bathe/dress mod I  Long term goals  Time Frame for Long term goals : LTG=STG  Patient Goals   Patient goals : to go home       Therapy Time   Individual Concurrent Group Co-treatment   Time In 1105         Time Out [facility]         Minutes [address], [place]TR/L [address]

## 2020-12-30 NOTE — PROGRESS NOTES
Patient eating breakfast and hypoxic o2 @78  with HF 40L 100%.   Reapplied cpap at this time [facility]

## 2020-12-30 NOTE — PROGRESS NOTES
Call received from family member stating that patient's spouse Christa Sullivan has been taken to [facility] and will be admitted. Family member asked for updates to be given to alternative emergency contacts Rochelle Zhou and Lucila Phillips until Christa Sullivan is available.

## 2020-12-30 NOTE — PLAN OF CARE
Problem: OXYGENATION/RESPIRATORY FUNCTION  Goal: Patient will maintain patent airway  12/30/2020 0036 by Dario Josue RN  Outcome: Ongoing     Problem: FLUID AND ELECTROLYTE IMBALANCE  Goal: Fluid and electrolyte balance are achieved/maintained  12/30/2020 0036 by Dario Josue RN  Outcome: Ongoing     Problem: Skin Integrity:  Goal: Absence of new skin breakdown  Description: Absence of new skin breakdown  12/30/2020 0036 by Dario Josue RN  Outcome: Ongoing     Problem: Skin Integrity:  Goal: Will show no infection signs and symptoms  Description: Will show no infection signs and symptoms  12/30/2020 0036 by aDrio Josue RN  Outcome: Ongoing

## 2020-12-30 NOTE — PLAN OF CARE
Problem: OXYGENATION/RESPIRATORY FUNCTION  Goal: Patient will maintain patent airway  Outcome: Ongoing  Goal: Patient will achieve/maintain normal respiratory rate/effort  Outcome: Ongoing     Problem: HEMODYNAMIC STATUS  Goal: Patient has stable vital signs and fluid balance  Outcome: Ongoing     Problem: FLUID AND ELECTROLYTE IMBALANCE  Goal: Fluid and electrolyte balance are achieved/maintained  Outcome: Ongoing     Problem: ACTIVITY INTOLERANCE/IMPAIRED MOBILITY  Goal: Mobility/activity is maintained at optimum level for patient  Outcome: Ongoing  Ambulating patient as tolerated     Problem: Falls - Risk of:  Goal: Will remain free from falls  Outcome: Ongoing  Goal: Absence of physical injury  Outcome: Ongoing     Problem: Skin Integrity:  Goal: Will show no infection signs and symptoms  Outcome: Ongoing  Goal: Absence of new skin breakdown  Outcome: Ongoing     Problem: Breathing Pattern - Ineffective:  Goal: Ability to achieve and maintain a regular respiratory rate will improve  Outcome: Ongoing     Problem:  Activity:  Goal: Ability to tolerate increased activity will improve  Outcome: Ongoing     Problem: Airway Clearance - Ineffective  Goal: Achieve or maintain patent airway  Outcome: Ongoing     Problem: Gas Exchange - Impaired  Goal: Absence of hypoxia  Outcome: Ongoing  Goal: Promote optimal lung function  Outcome: Ongoing Attempting to switch patient from Bipap to Vapotherm as tolerated. Patient able to tolerate an hour of being off Bipap and on 40 L of Vapotherm + 15 L non-rebreather. Was able to tolerate being off non-rebreather and ate a meal while on 40 L of Vapotherm. Patient's O2 saturation in 80s with no complaints from patient of SOB and patient in no visible distress. After about an hour, patient's O2 saturation in 70s and placed (still no SOB or distress) back on bipap- Patient's O2 saturation recovers and patient back satting in 90s. Will continue to switch patient to vapotherm a tolerated. Educated patient on benefits of prone position and patient agreeable to try at night or during day. Problem: Breathing Pattern - Ineffective  Goal: Ability to achieve and maintain a regular respiratory rate  Outcome: Ongoing     Problem:  Body Temperature -  Risk of, Imbalanced  Goal: Ability to maintain a body temperature within defined limits  Outcome: Ongoing  Goal: Will regain or maintain usual level of consciousness  Outcome: Ongoing  Goal: Complications related to the disease process, condition or treatment will be avoided or minimized  Outcome: Ongoing     Problem: Isolation Precautions - Risk of Spread of Infection  Goal: Prevent transmission of infection  Outcome: Ongoing     Problem: Nutrition Deficits  Goal: Optimize nutrtional status  Outcome: Ongoing     Problem: Risk for Fluid Volume Deficit  Goal: Maintain normal heart rhythm  Outcome: Ongoing  Goal: Maintain absence of muscle cramping  Outcome: Ongoing  Goal: Maintain normal serum potassium, sodium, calcium, phosphorus, and pH  Outcome: Ongoing     Problem: Loneliness or Risk for Loneliness  Goal: Demonstrate positive use of time alone when socialization is not possible  Outcome: Ongoing     Problem: Fatigue  Goal: Verbalize increase energy and improved vitality  Outcome: Ongoing     Problem: Patient Education: Go to Patient Education Activity Goal: Patient/Family Education  Outcome: Ongoing     Problem: Nutrition  Goal: Optimal nutrition therapy  Outcome: Ongoing

## 2020-12-31 NOTE — PLAN OF CARE
Problem: OXYGENATION/RESPIRATORY FUNCTION  Goal: Patient will maintain patent airway  Outcome: Ongoing     Problem: ACTIVITY INTOLERANCE/IMPAIRED MOBILITY  Goal: Mobility/activity is maintained at optimum level for patient  Outcome: Ongoing     Problem: Falls - Risk of:  Goal: Will remain free from falls  Description: Will remain free from falls  Outcome: Ongoing     Problem: Skin Integrity:  Goal: Absence of new skin breakdown  Description: Absence of new skin breakdown  Outcome: Ongoing

## 2020-12-31 NOTE — PROGRESS NOTES
Pt on Vapotherm 40L 100%, and 15L non-rebreather mask. Pt SpO2 81%, patient appears comfortable with no increased WOB. Dr. Ziyad Dunn aware of SpO2.

## 2020-12-31 NOTE — PROGRESS NOTES
Pulmonary Progress Note    CC:  Follow up hypoxia, COVID    Subjective:  Most of the day he is bilevel  Remains on 100% FIO2  Denies being SOB but is getting tired of the bilevel mask    ROS  Denies being SOB        Intake/Output Summary (Last 24 hours) at 12/31/2020 0709  Last data filed at 12/31/2020 0600  Gross per 24 hour   Intake 1200 ml   Output 250 ml   Net 950 ml         PHYSICAL EXAM:  Blood pressure (!) 146/81, pulse 89, temperature 97.5 °F (36.4 °C), temperature source Axillary, resp. rate 20, height 5' 8\" (1.727 m), weight 244 lb 14.9 oz (111.1 kg), SpO2 92 %.'  Gen: Pale, chronically ill and on bilevel   Eyes: PERRL. No sclera icterus. No conjunctival injection. ENT: No discharge. Pharynx with bilevel mask  Neck: Trachea midline. No obvious mass. Resp: Diminished but calm with bilevel   CV: Distant   GI: Non-tender. Non-distended. No hernia. Skin: Venous stasis   Lymph: No cervical LAD. No supraclavicular LAD. M/S: No cyanosis. No clubbing. No joint deformity. Neuro: Moves all four extremities. CN 2-12 tested, no defect noted.   Ext:   + edema    Medications:    Scheduled Meds:   dexamethasone  10 mg Intravenous Q24H    doxycycline hyclate  100 mg Oral 2 times per day    metoprolol succinate  50 mg Oral BID    remdesivir IVPB  100 mg Intravenous Q24H    insulin glargine  10 Units Subcutaneous BID    insulin lispro  0-12 Units Subcutaneous Q4H    Vitamin D  6,000 Units Oral Daily    zinc sulfate  50 mg Oral Daily    apixaban  5 mg Oral BID    atorvastatin  80 mg Oral Nightly    gabapentin  100 mg Oral Nightly    sacubitril-valsartan  1 tablet Oral BID    spironolactone  25 mg Oral Daily    sodium chloride flush  10 mL Intravenous 2 times per day       Continuous Infusions:   sodium chloride      sodium chloride      dextrose         PRN Meds: sodium chloride, sodium chloride, sodium chloride, LORazepam, guaiFENesin-dextromethorphan, sodium chloride, ALPRAZolam, benzonatate, albuterol sulfate HFA, glucose, dextrose, glucagon (rDNA), dextrose, sodium chloride flush, promethazine **OR** ondansetron, polyethylene glycol, acetaminophen **OR** acetaminophen    Labs:  CBC:   No results for input(s): WBC, HGB, HCT, MCV, PLT in the last 72 hours. BMP:   Recent Labs     20  0901 20  0859 20  0641   * 135* 133*   K 4.0 5.2* 4.4   CL 97* 96* 96*   CO2 26 27 25   PHOS 2.8 3.3 2.7   BUN 79* 74* 77*   CREATININE 1.5* 1.4* 1.5*     LIVER PROFILE:   Recent Labs     20  0901 20  0859 20  0641   AST 25 43* 30   ALT 20 23 26   BILITOT 0.6 0.8 0.7   ALKPHOS 92 103 97     PT/INR: No results for input(s): PROTIME, INR in the last 72 hours. APTT: No results for input(s): APTT in the last 72 hours. UA:No results for input(s): NITRITE, COLORU, PHUR, LABCAST, WBCUA, RBCUA, MUCUS, TRICHOMONAS, YEAST, BACTERIA, CLARITYU, SPECGRAV, LEUKOCYTESUR, UROBILINOGEN, BILIRUBINUR, BLOODU, GLUCOSEU, AMORPHOUS in the last 72 hours. Invalid input(s): Steph Claraalexis  No results for input(s): PH, PCO2, PO2 in the last 72 hours. Films:  Chest imaging reports were reviewed and imaging was reviewed by me and showed bilateral airspace disease with AICD    AB.33/85    Cultures:  Negative    I reviewed the labs and images listed above    Assessment:   · Acute Hypoxic Respiratory Failure with saturations less than 90% on room air  · COVID-19 Pneumonia  · Suspect ARDS  · Cardiomyopathy       Plan:  Titrate oxygen for saturations greater than or equal to 88%.  I would take off bilevel and let him stay off as long as he can tolerate it  · Change decadron to 10 mg tomorrow and continue for 5 more days   · Eliquis for DVT prophylaxis  · Do not intubate                DO JERICA Ferraro [facility] Pulmonary

## 2020-12-31 NOTE — PROGRESS NOTES
· Adjusted Body Weight:  ; No Adjustment   · Adjusted BMI:      · BMI Categories: Obese Class 2 (BMI 35.0 -39.9)       Nutrition Diagnosis:   · Inadequate oral intake related to impaired respiratory function as evidenced by intake 0-25%, other (comment)(Continuous BiPAP)      Nutrition Interventions:   Food and/or Nutrient Delivery:  Continue Current Diet, Continue Oral Nutrition Supplement  Nutrition Education/Counseling:  Education not indicated, No recommendation at this time   Coordination of Nutrition Care:  Continue to monitor while inpatient    Goals:  Meal and supplement intake greater than 50%       Nutrition Monitoring and Evaluation:   Behavioral-Environmental Outcomes:  None Identified   Food/Nutrient Intake Outcomes:  Food and Nutrient Intake, Supplement Intake  Physical Signs/Symptoms Outcomes:  Biochemical Data, Skin, Weight, Nutrition Focused Physical Findings, Fluid Status or Edema     Discharge Planning:     Too soon to determine     Electronically signed by Melvin Velásquez RD, JEFF on 12/31/20 at 2:52 PM EST    Contact: [phone]

## 2020-12-31 NOTE — PROGRESS NOTES
Hospitalist Progress Note      PCP: Vy Curtis MD    Date of Admission: 12/22/2020    Chief Complaint: shortness of breath    Subjective: Patient mentions he feels okay, he is on Vapotherm, appears comfortable while sitting in chair, no acute events overnight reported    Medications:  Reviewed    Infusion Medications    sodium chloride      sodium chloride      dextrose       Scheduled Medications    dexamethasone  10 mg Intravenous Q24H    doxycycline hyclate  100 mg Oral 2 times per day    metoprolol succinate  50 mg Oral BID    remdesivir IVPB  100 mg Intravenous Q24H    insulin glargine  10 Units Subcutaneous BID    insulin lispro  0-12 Units Subcutaneous Q4H    Vitamin D  6,000 Units Oral Daily    zinc sulfate  50 mg Oral Daily    apixaban  5 mg Oral BID    atorvastatin  80 mg Oral Nightly    gabapentin  100 mg Oral Nightly    sacubitril-valsartan  1 tablet Oral BID    spironolactone  25 mg Oral Daily    sodium chloride flush  10 mL Intravenous 2 times per day     PRN Meds: sodium chloride, sodium chloride, sodium chloride, LORazepam, guaiFENesin-dextromethorphan, sodium chloride, ALPRAZolam, benzonatate, albuterol sulfate HFA, glucose, dextrose, glucagon (rDNA), dextrose, sodium chloride flush, promethazine **OR** ondansetron, polyethylene glycol, acetaminophen **OR** acetaminophen      Intake/Output Summary (Last 24 hours) at 12/31/2020 1402  Last data filed at 12/31/2020 0600  Gross per 24 hour   Intake 600 ml   Output 100 ml   Net 500 ml       Physical Exam Performed:    BP 98/64   Pulse 89   Temp 97.6 °F (36.4 °C) (Axillary)   Resp 30   Ht 5' 8\" (1.727 m)   Wt 244 lb 14.9 oz (111.1 kg)   SpO2 94%   BMI 37.24 kg/m²     General appearance: Morbidly obese male, ill-appearing  HEENT:  Normal cephalic, atraumatic without obvious deformity. Pupils equal, round, and reactive to light. Extra ocular muscles intact. Conjunctivae/corneas clear. Neck: Supple, with full range of motion. Respiratory: On vapotherm, decreased breathing sounds bilaterally, Limited exam due to body habitus and disposable stethoscope used for exam  Cardiovascular:  Regular rate and rhythm with normal S1/S2 without murmurs, rubs or gallops. Abdomen: Soft, non-tender, non-distended with normal bowel sounds. Musculoskeletal:  No clubbing, cyanosis, 3+ bilateral lower extremity edema, chronic venous stasis changes full range of motion without deformity. Skin: Bilateral lower extremity 1+ edema present  Neurologic:   Following commands, no focal neurological deficits, no motor or sensory deficits  Psychiatric:   Alert and oriented to person place and time  Peripheral Pulses: +2 palpable, equal bilaterally           Labs:   No results for input(s): WBC, HGB, HCT, PLT in the last 72 hours. Recent Labs     12/29/20  0859 12/30/20  0641   * 133*   K 5.2* 4.4   CL 96* 96*   CO2 27 25   BUN 74* 77*   CREATININE 1.4* 1.5*   CALCIUM 9.7 9.1   PHOS 3.3 2.7     Recent Labs     12/29/20  0859 12/30/20  0641   AST 43* 30   ALT 23 26   BILITOT 0.8 0.7   ALKPHOS 103 97     No results for input(s): INR in the last 72 hours. No results for input(s): Soliman Scrivener in the last 72 hours. Urinalysis:      Lab Results   Component Value Date    NITRU Negative 02/06/2014    WBCUA 0-2 02/06/2014    RBCUA 0-2 02/06/2014    BLOODU TRACE-INTACT 02/06/2014    SPECGRAV 1.015 02/06/2014    GLUCOSEU Negative 02/06/2014       Radiology:  XR CHEST PORTABLE   Final Result   Bilateral airspace disease, increased compared to prior, with increased   left-sided pleural effusion         XR CHEST PORTABLE   Final Result   Multifocal airspace opacities which may represent pulmonary edema,   atelectasis and/or pneumonia. Findings nonspecific but can be seen with   atypical and viral pneumonia. Please correlate with patient history,   particularly of COVID-19 exposure.                  Assessment/Plan:

## 2020-12-31 NOTE — PROGRESS NOTES
Patient uncomfortable wearing cpap , states needs break to shave, vapo therm and non re breather reapplied per patient request Angel Forrest'

## 2020-12-31 NOTE — PLAN OF CARE
Problem: Nutrition Deficits  Goal: Optimize nutrtional status  Outcome: Ongoing   Nutrition Problem #1: Inadequate oral intake  Intervention: Food and/or Nutrient Delivery: Continue Current Diet, Continue Oral Nutrition Supplement  Nutritional Goals: Meal and supplement intake greater than 50%

## 2020-12-31 NOTE — PROGRESS NOTES
Occupational Therapy  Facility/Department: [address] PROGRESSIVE CARE  Daily Treatment Note  NAME: Cipriano White  : 1945  MRN: [id number]    Date of Service: 2020    Discharge Recommendations:  Continue to assess pending progress     Assessment   Performance deficits / Impairments: Decreased functional mobility ; Decreased endurance;Decreased ADL status; Decreased safe awareness;Decreased balance  Assessment: Pt continues to be limited by decreased activity tolerance and requiring high amounts of O2. RN allowed pt to switch from Bipap to Vapotherm to allow pt to shave his face while sitting EOB, though pt desatted to mid 50s after briefly removing NRB and very difficult to recover - RN aware, pt sitting EOB to eat lunch. Pt denies any fatigue or SOB throughout, states \"I feel perfectly fine! \" Will continue to see on acute and progress pt's activity as able. COnt per POC  Prognosis: Fair  OT Education: Plan of Care;OT Role;Energy Conservation; ADL Adaptive Strategies  REQUIRES OT FOLLOW UP: Yes  Activity Tolerance  Activity Tolerance: Patient limited by fatigue  Activity Tolerance: pt desatted to mid 50s during activity sitting EOB, RN in room and aware. rebounded to low-mid 70s after rest break, RN advised to allow pt to sit EOB and eat lunch at end of session  Safety Devices  Safety Devices in place: Yes  Type of devices: Call light within reach; Patient at risk for falls; Left in bed(pt sitting EOB eating lunch at end of session)         Patient Diagnosis(es): The primary encounter diagnosis was Pneumonia due to COVID-19 virus. Diagnoses of Hypoxemia requiring supplemental oxygen and Type 2 diabetes mellitus with hyperglycemia, unspecified whether long term insulin use ([facility] were also pertinent to this visit. has a past medical history of Acute anxiety, Acute on chronic systolic congestive heart failure ([address] Anemia, Arthritis, Atrial fibrillation ([address] Bronchiolitis obliterans organizing pneumonia ([facility] CAD (coronary artery disease), CHF (congestive heart failure) ([facility] Congestive heart failure, unspecified, Disease of blood and blood forming organ, Hyperlipidemia, Hypertension, Kidney stone, Neuropathy, Osteoarthritis, Pneumonia, Pure hypercholesterolemia, Seasonal allergies, Sleep apnea, Type II or unspecified type diabetes mellitus without mention of complication, not stated as uncontrolled, and Type II or unspecified type diabetes mellitus without mention of complication, uncontrolled. has a past surgical history that includes Coronary angioplasty with stent (2000); Hemorrhoid surgery; Coronary angioplasty with stent (02.2090); Cardiac defibrillator placement; ablation of dysrhythmic focus (11/06/2017); pacemaker placement; Intracapsular cataract extraction (Right, 1/14/2020); and Cataract removal.    Restrictions  Restrictions/Precautions  Restrictions/Precautions: Isolation, Fall Risk  Position Activity Restriction  Other position/activity restrictions: Droplet Plus : COVID +. Bipap currently in use. Nursing reports ok for ex/activity as george.   Subjective   General  Chart Reviewed: Yes  Patient assessed for rehabilitation services?: Yes Grooming: Minimal assistance(pt shaved face with electric razor sitting EOB. pt desatted to mid 46s after briefly removing NRB, so OT redonned and assisted to shave around it.  RN in room and aware)        Balance  Sitting Balance: Stand by assistance(pt sat EOB x15 mins SBA)     Transfers  Sit to stand: Unable to assess  Stand to sit: Unable to assess                       Cognition  Arousal/Alertness: Appropriate responses to stimuli  Attention Span: Appears intact  Memory: Decreased recall of recent events  Safety Judgement: Decreased awareness of need for safety  Insights: Decreased awareness of deficits          Plan   Plan  Times per week: 3-5  Times per day: Daily  Current Treatment Recommendations: Strengthening, Endurance Training, ROM, Balance Training, Functional Mobility Training, Safety Education & Training, Self-Care / ADL, Equipment Evaluation, Education, & procurement, Patient/Caregiver Education & Training    AM-PAC Score        AM-[facility] Inpatient Daily Activity Raw Score: 16 (12/30/20 1131)  AM-PAC Inpatient ADL T-Scale Score : 35.96 (12/30/20 1131)  ADL Inpatient CMS 0-100% Score: 53.32 (12/30/20 1131)  ADL Inpatient CMS G-Code Modifier : CK (12/30/20 1131)    Goals  Short term goals  Time Frame for Short term goals: prior to d/c  Short term goal 1: Pt will complete fxl transfers mod I  Short term goal 2: Pt will complete fxl mobility mod I  Short term goal 3: Pt will toilet mod I  Short term goal 4: Pt will groom in stance at sink mod I  Short term goal 5: Pt will bathe/dress mod I  Long term goals  Time Frame for Long term goals : LTG=STG  Patient Goals   Patient goals : to go home       Therapy Time   Individual Concurrent Group Co-treatment   Time In 1350         Time Out 1415         Minutes [address], OTR/L [id number]

## 2020-12-31 NOTE — PROGRESS NOTES
Physical Therapy  Facility/Department: [address] PROGRESSIVE CARE  Daily Treatment Note  NAME: Brian Upton  : 1945  MRN: [id number]    Date of Service: 2020    Discharge Recommendations:  Continue to assess pending progress   PT Equipment Recommendations  Other: Will monitor for potential equipt needs. Assessment   Body structures, Functions, Activity limitations: Decreased functional mobility ; Decreased strength;Decreased safe awareness;Decreased cognition;Decreased endurance  Assessment: 69 y/o male admit 2020 with Pneumonia, Acute Respiratory, COVID +. PMH as noted including CAD, CHF, Cardiomyopathy, Defib Place,OA. PTA pt living with wife in mobile home with few steps to enter. Pt reports independent with daily care and functional mobility (use of cane, Rollator prn). At this time, pt appears very fatigued/limited george to activity only at bedside. Pt currently requiring O2 via Bipap. Will need to monitor pt's progress, hopeful increase activity george as O2 needs decrease. Prognosis: Fair  Decision Making: Medium Complexity  History: 69 y/o male admit 2020 with Pneumonia, Acute Respiratory, COVID +. PMH as noted including CAD, CHF, Cardiomyopathy, Defib Place,OA. Exam: See above. Clinical Presentation: See above. Patient Education: Role of PT, POC, Need to call for assist.  Barriers to Learning: Cognitive, Endurance. REQUIRES PT FOLLOW UP: Yes  Activity Tolerance  Activity Tolerance: Patient limited by endurance  Activity Tolerance: Pt now requiring high levels of O2 (currently on Bipap). George ex/oob to chair with Yeni Ly. Very limited endurance. Patient Diagnosis(es): The primary encounter diagnosis was Pneumonia due to COVID-19 virus. Diagnoses of Hypoxemia requiring supplemental oxygen and Type 2 diabetes mellitus with hyperglycemia, unspecified whether long term insulin use ([facility] were also pertinent to this visit. has a past medical history of Acute anxiety, Acute on chronic systolic congestive heart failure ([address] Anemia, Arthritis, Atrial fibrillation ([address] Bronchiolitis obliterans organizing pneumonia ([address] CAD (coronary artery disease), CHF (congestive heart failure) ([address] Congestive heart failure, unspecified, Disease of blood and blood forming organ, Hyperlipidemia, Hypertension, Kidney stone, Neuropathy, Osteoarthritis, Pneumonia, Pure hypercholesterolemia, Seasonal allergies, Sleep apnea, Type II or unspecified type diabetes mellitus without mention of complication, not stated as uncontrolled, and Type II or unspecified type diabetes mellitus without mention of complication, uncontrolled. has a past surgical history that includes Coronary angioplasty with stent (2000); Hemorrhoid surgery; Coronary angioplasty with stent (89.7997); Cardiac defibrillator placement; ablation of dysrhythmic focus (11/06/2017); pacemaker placement; Intracapsular cataract extraction (Right, 1/14/2020); and Cataract removal.    Restrictions  Restrictions/Precautions  Restrictions/Precautions: Isolation, Fall Risk  Position Activity Restriction  Other position/activity restrictions: Droplet Plus : COVID +. Bipap currently in use. Nursing reports ok for ex/activity as george. Subjective   General  Chart Reviewed: Yes  Additional Pertinent Hx: 69 y/o male admit 12/22/2020 with Pneumonia, Acute Respiratory, COVID +. PMH as noted including CAD, CHF, Cardiomyopathy, Difib Place,OA. Response To Previous Treatment: Patient with no complaints from previous session. Family / Caregiver Present: No  Referring Practitioner: Dr. Collette Haws. Subjective  Subjective: Pt agreeable to PT Rx.           Orientation     Cognition   Cognition  Arousal/Alertness: Appropriate responses to stimuli  Attention Span: Appears intact  Memory: Decreased recall of recent events  Safety Judgement: Decreased awareness of need for safety Insights: Decreased awareness of deficits  Objective   Bed mobility  Supine to Sit: Supervision  Transfers  Sit to Stand: Minimal Assistance(With Walker. Cues for safe hand placement.)  Stand to sit: Minimal Assistance(With Walker. Cues for safe hand placement.)  Ambulation  Ambulation?: Yes  Ambulation 1  Surface: level tile  Device: Rolling Walker  Distance: 5-6 steps bed to chair  with [employer] assist.  Cues for upright posture, diminished step length/clearance. Very limited endurance/george to activity. Exercises  Quad Sets: 10x  Gluteal Sets: 10x  Hip Flexion: 10x  Knee Long Arc Quad: 10x  Ankle Pumps: 20x                        AM-PAC Score  AM-PAC Inpatient Mobility Raw Score : 15 (12/29/20 1043)  AM-PAC Inpatient T-Scale Score : 39.45 (12/29/20 1043)  Mobility Inpatient CMS 0-100% Score: 57.7 (12/29/20 1043)  Mobility Inpatient CMS G-Code Modifier : CK (12/29/20 1043)          Goals  Short term goals  Time Frame for Short term goals: Upon d/c acute care setting. Short term goal 1: Bed Mob Independent. Short term goal 2: Transfers with assist device SBA/Supervision. Short term goal 3: Amb with/without assist device 50' SBA. Patient Goals   Patient goals : Go home with wife. Plan    Plan  Times per week: 3-5x week while in acute care setting.   Current Treatment Recommendations: Strengthening, Functional Mobility Training, Transfer Training, Gait Training, Safety Education & Training, Patient/Caregiver Education & Training  Safety Devices  Type of devices: Call light within reach, Chair alarm in place, Left in chair, Nurse notified     Therapy Time   Individual Concurrent Group Co-treatment   Time In 0745         Time Out 0810         Minutes [facility],

## 2021-01-01 VITALS
BODY MASS INDEX: 37.12 KG/M2 | WEIGHT: 244.93 LBS | TEMPERATURE: 97.4 F | RESPIRATION RATE: 32 BRPM | SYSTOLIC BLOOD PRESSURE: 125 MMHG | DIASTOLIC BLOOD PRESSURE: 77 MMHG | OXYGEN SATURATION: 74 % | HEIGHT: 68 IN | HEART RATE: 94 BPM

## 2021-01-01 LAB
GLUCOSE BLD-MCNC: 180 MG/DL (ref 70–99)
PERFORMED ON: ABNORMAL
SARS-COV-2, NAAT: DETECTED

## 2021-01-01 PROCEDURE — 94761 N-INVAS EAR/PLS OXIMETRY MLT: CPT

## 2021-01-01 PROCEDURE — 6360000002 HC RX W HCPCS: Performed by: INTERNAL MEDICINE

## 2021-01-01 PROCEDURE — 94660 CPAP INITIATION&MGMT: CPT

## 2021-01-01 PROCEDURE — 2700000000 HC OXYGEN THERAPY PER DAY

## 2021-01-01 PROCEDURE — 6370000000 HC RX 637 (ALT 250 FOR IP): Performed by: NURSE PRACTITIONER

## 2021-01-01 PROCEDURE — 6360000002 HC RX W HCPCS: Performed by: STUDENT IN AN ORGANIZED HEALTH CARE EDUCATION/TRAINING PROGRAM

## 2021-01-01 PROCEDURE — 6360000002 HC RX W HCPCS: Performed by: NURSE PRACTITIONER

## 2021-01-01 PROCEDURE — U0002 COVID-19 LAB TEST NON-CDC: HCPCS

## 2021-01-01 RX ORDER — LORAZEPAM 2 MG/ML
1 INJECTION INTRAMUSCULAR
Status: DISCONTINUED | OUTPATIENT
Start: 2021-01-01 | End: 2021-01-01 | Stop reason: HOSPADM

## 2021-01-01 RX ORDER — FENTANYL CITRATE 50 UG/ML
100 INJECTION, SOLUTION INTRAMUSCULAR; INTRAVENOUS ONCE
Status: COMPLETED | OUTPATIENT
Start: 2021-01-01 | End: 2021-01-01

## 2021-01-01 RX ORDER — MORPHINE SULFATE 4 MG/ML
4 INJECTION, SOLUTION INTRAMUSCULAR; INTRAVENOUS
Status: DISCONTINUED | OUTPATIENT
Start: 2021-01-01 | End: 2021-01-01 | Stop reason: HOSPADM

## 2021-01-01 RX ORDER — MORPHINE SULFATE 2 MG/ML
2 INJECTION, SOLUTION INTRAMUSCULAR; INTRAVENOUS
Status: DISCONTINUED | OUTPATIENT
Start: 2021-01-01 | End: 2021-01-01

## 2021-01-01 RX ADMIN — LORAZEPAM 1 MG: 2 INJECTION INTRAMUSCULAR; INTRAVENOUS at 03:24

## 2021-01-01 RX ADMIN — INSULIN LISPRO 2 UNITS: 100 INJECTION, SOLUTION INTRAVENOUS; SUBCUTANEOUS at 02:37

## 2021-01-01 RX ADMIN — FENTANYL CITRATE 100 MCG: 50 INJECTION INTRAMUSCULAR; INTRAVENOUS at 05:03

## 2021-01-01 RX ADMIN — MORPHINE SULFATE 2 MG: 2 INJECTION, SOLUTION INTRAMUSCULAR; INTRAVENOUS at 03:24

## 2021-01-01 RX ADMIN — DEXAMETHASONE SODIUM PHOSPHATE 10 MG: 10 INJECTION, SOLUTION INTRAMUSCULAR; INTRAVENOUS at 04:08

## 2021-01-01 NOTE — PROGRESS NOTES
Assisted pt in removing BIPAP to drink water and take pills. Vapotherm placed with NRB for only about 2 min before mask was replaced. Sats dropped all the way into the 50's, pt became cyanotic. Pt remained alert and O2 sats recovered to 90's within 5 min. Will continue to monitor.

## 2021-01-01 NOTE — PROGRESS NOTES
0300:  Charge RN saw through window that bipap mask laying on bed next to pt. Charge RN and this RN quickly entered room and attempted to replace mask. Approx 2 minutes later pt becomes alert and attempts to remove mask again. Pt repeatedly tells RN \"no, don't make me put it on. \" BIPAP mask removed, Vapotherm at 40L, and NRB placed on pt. Pt appears in distress, tachypneic, accessory muscle use, labored work of breathing. Sats in the 60's. 0310: Wife notified, wife agrees to respect pt's wishes and agrees that we begin to give pt comfort care medications and focus solely on the pt's comfort. 2115:  Call made to [facility] [place], for comfort meds    0320: Orders for Ativan and morphine acknowledged by other RN    0324: 1mg Ativan and 2mg Morphine given by other RN    0330: This RN at bedside, Wife is pt in hospital, also Covid positive. Wife will be coming to bedside with her assigned RN- this is approved by MD Lozano Job: Wife Christa Sullivan, her nurse Ryne and this RN bedside with pt.     5138: Dr. Anam Darden paged for more comfort medications, pt moaning, and grunting and appears to be in discomfort. 0503: One time dose of fentanyl given. 1168: Pt breathing has slowed to a normal rate, pt no longer grunting or moaning. 0530: Wife and RN Ryne exit pts room    0550: Brother Rochelle Turpinn outside pt's door, educated on the risks of entering and the proper use of PPE. RN assisted Rochelle Zhou to don PPE appropriately including gown, mask, face shield, and gloves. Brother enters room, other family members remain in hallway outside of the room    0600: Time of Death     0604: Dr Anam Darden notified.      Electronically signed by Amaury Denny RN on 1/1/2021 at 8:36 AM

## 2021-01-01 NOTE — PROGRESS NOTES
0025:  This RN noticed pts sats were recording in the high 70's, Pleth examined the reading was lost and then SPO2 read at 60%  RN moved quickly into the room. Pt lying flat on his back horizontally across the bed with BIPAP mask sitting beside him, called for help, 3 other RNs entered to assist, Bipap immediately placed on pt, sats reading at 26%, pt's carotid pulse extremely weak and thready. BIPAP mask secured, pt placed back in bed and HOB raised. Bed noted to be soiled. Pts sats slowly climbed. Pt became alert to voice and touch. Pt cleaned, linens changed. Pt now resting quietly, remains tachypneic RR 27, O2 89% but does not appear to be in distress. Will continue to monitor closely.        Electronically signed by Kasandra Nicholas RN on 1/1/2021 at 1:29 AM

## 2021-01-01 NOTE — PROGRESS NOTES
Spoke with granddaughter, Pat Marques, regarding patient's COVID swab and visitation policies. Rapid COVID swab did return a positive result, so we will have to maintain the end of life visitor policy with one person entering room for no longer than 15 minutes with PPE donned. Since all family would not be allowed to come in room, Pat Marques and the other grandchildren agreed that they would be comfortable with the option of staying outside the room with their masks on and seeing patient through the room window. Security notified that visitors would be arriving.

## 2021-01-18 NOTE — DISCHARGE SUMMARY
Hospital Medicine Discharge Summary    Patient ID: Carmen Schwabf      Patient's PCP: Arminda Brunner, MD    Admit Date: 2020     Discharge Date: 2021     Admitting Physician: Hang Nance MD     Discharge Physician: Marbin Luna MD     Discharge Diagnoses: Active Hospital Problems    Diagnosis Date Noted    ARDS (adult respiratory distress syndrome) ([facility] [J80]     Pneumonia due to COVID-19 virus [U07.1, J12.82]     Paroxysmal atrial fibrillation (HCC) [I48.0]     Acute respiratory failure with hypoxemia ([facility] [J96.01] 2020    Acute on chronic systolic heart failure (HCC) [I50.23] 2018    Biventricular ICD (implantable cardioverter-defibrillator) in place [Z95.810] 2015       The patient was seen and examined on day of discharge and this discharge summary is in conjunction with any daily progress note from day of discharge. Condition at discharge - stable    Hospital Course: Patient   COVID-19 pneumonia  Acute on chronic systolic heart failure  CAD  Elevated troponin  Ischemic cardiomyopathy s/p ICD  Diabetes mellitus  Obesity due to excess calories     Cause of Death: COVID pneumonia       Exam:     /77   Pulse 94   Temp 97.4 °F (36.3 °C) (Axillary)   Resp (!) 32   Ht 5' 8\" (1.727 m)   Wt 244 lb 14.9 oz (111.1 kg)   SpO2 (!) 74%   BMI 37.24 kg/m²     Patient     Consults:     IP CONSULT TO CARDIOLOGY  IP CONSULT TO PULMONOLOGY  IP CONSULT TO PALLIATIVE CARE  IP CONSULT TO PHARMACY      Code Status:  Prior    Activity: activity as tolerated    Labs:  For convenience and continuity at follow-up the following most recent labs are provided:      CBC:    Lab Results   Component Value Date    WBC 9.3 2020    HGB 14.5 2020    HCT 44.7 2020     2020       Renal:    Lab Results   Component Value Date     2020    K 4.6 2020    K 3.9 2020    CL 95 2020    CO2 23 2020 BUN 93 12/31/2020    CREATININE 1.9 12/31/2020    CALCIUM 9.1 12/31/2020    PHOS 3.6 12/31/2020       Discharge Medications:     Discharge Medication List as of 1/1/2021 11:10 AM           Details   insulin 70-30 (NOVOLIN 70/30) (70-30) 100 UNIT per ML injection vial Inject 55 Units into the skin 2 times daily (before meals), Disp-1 vial, R-0Adjust Sig      ALPRAZolam (XANAX) 0.5 MG tablet 1 po qd if needed for anxiety. No driving, no etoh. , Disp-90 tablet, R-0Print      sacubitril-valsartan (ENTRESTO) 24-26 MG per tablet Take 1 tablet by mouth 2 times daily, Disp-180 tablet,R-4Print      metoprolol succinate (TOPROL XL) 100 MG extended release tablet TAKE 1 TABLET BY MOUTH EVERY DAY, Disp-90 tablet,R-1**Patient requests 90 days supply**Normal      apixaban (ELIQUIS) 5 MG TABS tablet TAKE 1 TABLET TWICE DAILY, Disp-180 tablet,R-4Print      spironolactone (ALDACTONE) 25 MG tablet TAKE 1 TABLET BY MOUTH  DAILY, Disp-90 tablet,R-2Requesting 1 year supplyNormal      atorvastatin (LIPITOR) 80 MG tablet TAKE 1 TABLET BY MOUTH  DAILY, Disp-90 tablet,R-2Requesting 1 year supplyNormal      torsemide (DEMADEX) 20 MG tablet TAKE 1 TABLET BY MOUTH  DAILY, Disp-90 tablet,R-2Requesting 1 year supplyNormal      gabapentin (NEURONTIN) 100 MG capsule Take 1 capsule by mouth nightly for 180 days. , Disp-90 capsule,R-1Normal      polyethyl glycol-propyl glycol 0.4-0.3 % (SYSTANE) 0.4-0.3 % ophthalmic solution Place 1 drop into the right eye nightlyHistorical Med      albuterol sulfate HFA (PROAIR HFA) 108 (90 Base) MCG/ACT inhaler Inhale 2 puffs into the lungs every 6 hours as needed for Wheezing, Disp-1 Inhaler, R-3Normal             Time Spent on discharge is more than 30 mints in the examination, evaluation, counseling and review of medications and discharge plan.       Signed:    Emely Hayes MD   1/17/2021 Thank you Marilee Oakes MD for the opportunity to be involved in this patient's care. If you have any questions or concerns please feel free to contact me at [phone].

## 2021-01-21 RX ORDER — GABAPENTIN 100 MG/1
CAPSULE ORAL
Qty: 90 CAPSULE | Refills: 3 | OUTPATIENT
Start: 2021-01-21

## 2023-04-27 NOTE — TELEPHONE ENCOUNTER
I will but recommend getting covid test done  Will order   If worse or sob to the ER Consent (Scalp)/Introductory Paragraph: The rationale for Mohs was explained to the patient and consent was obtained. The risks, benefits and alternatives to therapy were discussed in detail. Specifically, the risks of changes in hair growth pattern secondary to repair, infection, scarring, bleeding, prolonged wound healing, incomplete removal, allergy to anesthesia, nerve injury and recurrence were addressed. Prior to the procedure, the treatment site was clearly identified and confirmed by the patient. All components of Universal Protocol/PAUSE Rule completed.

## (undated) DEVICE — COVER,MAYO STAND,STERILE: Brand: MEDLINE

## (undated) DEVICE — SOLUTION IV IRRIG WATER 500ML POUR BRL ST 2F7113

## (undated) DEVICE — NEEDLE HYPO 27GA L0.5IN GRY POLYPR HUB S STL REG BVL STR

## (undated) DEVICE — PACK PROC FLD MGMT SYS CENTURION CUST

## (undated) DEVICE — SOLUTION IRRIG BSS ST 500ML

## (undated) DEVICE — SYRINGE, LUER LOCK, 30ML: Brand: MEDLINE

## (undated) DEVICE — BOWL MED L 32OZ PLAS W/ MOLD GRAD EZ OPN PEEL PCH

## (undated) DEVICE — MICROSURGICAL INSTRUMENT ANTERIOR CHAMBER CANNULA 30GA: Brand: ALCON

## (undated) DEVICE — Device

## (undated) DEVICE — GLOVE SURG SZ 75 L12IN FNGR THK87MIL WHT LTX FREE

## (undated) DEVICE — SYRINGE TB 1ML NDL 25GA L0.625IN PLAS SLIP TIP CONVENTIONAL

## (undated) DEVICE — NEEDLE HYPO 18GA L1.5IN PNK POLYPR HUB S STL REG BVL STR